# Patient Record
Sex: MALE | Race: WHITE | Employment: OTHER | URBAN - METROPOLITAN AREA
[De-identification: names, ages, dates, MRNs, and addresses within clinical notes are randomized per-mention and may not be internally consistent; named-entity substitution may affect disease eponyms.]

---

## 2017-02-02 ENCOUNTER — LAB ENCOUNTER (OUTPATIENT)
Dept: LAB | Age: 67
End: 2017-02-02
Attending: FAMILY MEDICINE
Payer: MEDICARE

## 2017-02-02 DIAGNOSIS — I10 ESSENTIAL HYPERTENSION WITH GOAL BLOOD PRESSURE LESS THAN 130/80: ICD-10-CM

## 2017-02-02 DIAGNOSIS — E11.9 CONTROLLED TYPE 2 DIABETES MELLITUS WITHOUT COMPLICATION, WITHOUT LONG-TERM CURRENT USE OF INSULIN (HCC): ICD-10-CM

## 2017-02-02 DIAGNOSIS — E78.2 MIXED HYPERLIPIDEMIA: ICD-10-CM

## 2017-02-02 LAB
ALBUMIN SERPL-MCNC: 4 G/DL (ref 3.5–4.8)
ALP LIVER SERPL-CCNC: 95 U/L (ref 45–117)
ALT SERPL-CCNC: 46 U/L (ref 17–63)
AST SERPL-CCNC: 28 U/L (ref 15–41)
BILIRUB SERPL-MCNC: 0.6 MG/DL (ref 0.1–2)
BUN BLD-MCNC: 19 MG/DL (ref 8–20)
CALCIUM BLD-MCNC: 9 MG/DL (ref 8.3–10.3)
CHLORIDE: 101 MMOL/L (ref 101–111)
CHOLEST SMN-MCNC: 108 MG/DL (ref ?–200)
CO2: 30 MMOL/L (ref 22–32)
CREAT BLD-MCNC: 0.89 MG/DL (ref 0.7–1.3)
EST. AVERAGE GLUCOSE BLD GHB EST-MCNC: 120 MG/DL (ref 68–126)
GLUCOSE BLD-MCNC: 90 MG/DL (ref 70–99)
HBA1C MFR BLD HPLC: 5.8 % (ref ?–5.7)
HDLC SERPL-MCNC: 49 MG/DL (ref 45–?)
HDLC SERPL: 2.2 {RATIO} (ref ?–4.97)
LDLC SERPL CALC-MCNC: 36 MG/DL (ref ?–130)
M PROTEIN MFR SERPL ELPH: 8.1 G/DL (ref 6.1–8.3)
NONHDLC SERPL-MCNC: 59 MG/DL (ref ?–130)
POTASSIUM SERPL-SCNC: 3.6 MMOL/L (ref 3.6–5.1)
SODIUM SERPL-SCNC: 139 MMOL/L (ref 136–144)
TRIGLYCERIDES: 114 MG/DL (ref ?–150)
VLDL: 23 MG/DL (ref 5–40)

## 2017-02-02 PROCEDURE — 36415 COLL VENOUS BLD VENIPUNCTURE: CPT

## 2017-02-02 PROCEDURE — 83036 HEMOGLOBIN GLYCOSYLATED A1C: CPT

## 2017-02-02 PROCEDURE — 80061 LIPID PANEL: CPT

## 2017-02-02 PROCEDURE — 80053 COMPREHEN METABOLIC PANEL: CPT

## 2017-02-03 NOTE — PROGRESS NOTES
Quick Note:    Hemoglobin A1c 5.8, lipids and CMP are normal will discuss at follow-up office visit.   Sent to my chart  ______

## 2017-02-06 ENCOUNTER — OFFICE VISIT (OUTPATIENT)
Dept: FAMILY MEDICINE CLINIC | Facility: CLINIC | Age: 67
End: 2017-02-06

## 2017-02-06 VITALS
BODY MASS INDEX: 33.25 KG/M2 | HEART RATE: 96 BPM | DIASTOLIC BLOOD PRESSURE: 60 MMHG | TEMPERATURE: 99 F | HEIGHT: 65.5 IN | SYSTOLIC BLOOD PRESSURE: 124 MMHG | WEIGHT: 202 LBS

## 2017-02-06 DIAGNOSIS — E11.9 CONTROLLED TYPE 2 DIABETES MELLITUS WITHOUT COMPLICATION, WITHOUT LONG-TERM CURRENT USE OF INSULIN (HCC): Primary | ICD-10-CM

## 2017-02-06 DIAGNOSIS — E66.9 OBESITY (BMI 30.0-34.9): ICD-10-CM

## 2017-02-06 DIAGNOSIS — J06.9 ACUTE URI: ICD-10-CM

## 2017-02-06 DIAGNOSIS — E78.2 MIXED HYPERLIPIDEMIA: ICD-10-CM

## 2017-02-06 DIAGNOSIS — I10 ESSENTIAL HYPERTENSION WITH GOAL BLOOD PRESSURE LESS THAN 130/80: ICD-10-CM

## 2017-02-06 PROBLEM — E66.811 OBESITY (BMI 30.0-34.9): Status: ACTIVE | Noted: 2017-02-06

## 2017-02-06 LAB
CREAT UR-SCNC: 81 MG/DL
MICROALBUMIN UR-MCNC: <0.5 MG/DL

## 2017-02-06 PROCEDURE — 99214 OFFICE O/P EST MOD 30 MIN: CPT | Performed by: FAMILY MEDICINE

## 2017-02-06 RX ORDER — GUAIFENESIN AND CODEINE PHOSPHATE 100; 10 MG/5ML; MG/5ML
SOLUTION ORAL
Qty: 118 ML | Refills: 0 | Status: SHIPPED | OUTPATIENT
Start: 2017-02-06 | End: 2017-03-14 | Stop reason: ALTCHOICE

## 2017-02-06 RX ORDER — DIPHENHYDRAMINE HCL 25 MG
25 TABLET ORAL NIGHTLY
COMMUNITY
End: 2019-09-03

## 2017-02-06 NOTE — PROGRESS NOTES
HPI:   Sheyla Nunn is a 73year old male who presents for recheck of his diabetes. Patient’s FBS have been 80. Last visit with ophthalmologist was 6/23/16 normal.    Pt has been checking his feet on a regular basis.    Pt denies any tingling of BY MOUTH 2 (TWO) TIMES DAILY WITH MEALS. Disp: 60 tablet Rfl: 5   Losartan Potassium-HCTZ 100-25 MG Oral Tab TAKE 1 TABLET BY MOUTH ONCE DAILY. Disp: 30 tablet Rfl: 5   Atorvastatin Calcium 10 MG Oral Tab TAKE 1 TABLET (10 MG TOTAL) BY MOUTH ONCE DAILY.  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 Also advised to review the Mediterranean diet on line. - Microalb/Creat Ratio, Random Urine    3.  Essential hypertension with goal blood pressure less than 130/80  Continue with amlodipine and l

## 2017-02-20 ENCOUNTER — TELEPHONE (OUTPATIENT)
Dept: FAMILY MEDICINE CLINIC | Facility: CLINIC | Age: 67
End: 2017-02-20

## 2017-02-20 NOTE — TELEPHONE ENCOUNTER
Does it sound like an infection or just a cough? Did he try Mucinex? Any wheezing or shortness of breath? Any fever?

## 2017-02-20 NOTE — TELEPHONE ENCOUNTER
5. Acute URI  Sudafed from pharmacist (generic pseudoephedrine) or Coricidin HBP if has hypertension  Guaifenesin generic (Mucinex) for expectorant    Marlena pot-- saline irrigation or saline spray  Vitamin C , Zinc and Echinacea    OTC Flonase for nasal co

## 2017-02-21 RX ORDER — ALBUTEROL SULFATE 90 UG/1
AEROSOL, METERED RESPIRATORY (INHALATION)
Qty: 1 INHALER | Refills: 0 | Status: SHIPPED | OUTPATIENT
Start: 2017-02-21 | End: 2017-03-06

## 2017-02-21 NOTE — TELEPHONE ENCOUNTER
Albuterol inhaler 1-2 puffs every 4-6 hours as needed. Call if signs of infection. Take plain guaifenesin OTC.

## 2017-02-21 NOTE — TELEPHONE ENCOUNTER
Pt called back and said dry, hefty cough, tried Mucinex helped with the cold symptoms but still with the cough. Denies shortness of breath, denies fever. Pt states the cough seems to be raspy at times. Please advise.  thanks

## 2017-02-23 ENCOUNTER — TELEPHONE (OUTPATIENT)
Dept: FAMILY MEDICINE CLINIC | Facility: CLINIC | Age: 67
End: 2017-02-23

## 2017-02-23 NOTE — TELEPHONE ENCOUNTER
PA was initiated thru Cover my Meds today for Lidoderm patches 5%. Medication was approved. Cert [id number]  Approved from 1-1-2017-2/23/2018.   Copy of approval faxed to [employer] at [phone]

## 2017-03-10 ENCOUNTER — TELEPHONE (OUTPATIENT)
Dept: FAMILY MEDICINE CLINIC | Facility: CLINIC | Age: 67
End: 2017-03-10

## 2017-03-10 NOTE — TELEPHONE ENCOUNTER
Emmie Runner has lingering cough/sinus congestion for a month now. Nory Minaya gave him inhaler on last visit. Not getting any better. Was trying to get in today or Monday? Any suggestions?

## 2017-03-14 ENCOUNTER — OFFICE VISIT (OUTPATIENT)
Dept: FAMILY MEDICINE CLINIC | Facility: CLINIC | Age: 67
End: 2017-03-14

## 2017-03-14 VITALS
RESPIRATION RATE: 16 BRPM | HEART RATE: 108 BPM | BODY MASS INDEX: 33.09 KG/M2 | OXYGEN SATURATION: 97 % | SYSTOLIC BLOOD PRESSURE: 130 MMHG | HEIGHT: 65.5 IN | WEIGHT: 201 LBS | DIASTOLIC BLOOD PRESSURE: 60 MMHG | TEMPERATURE: 98 F

## 2017-03-14 DIAGNOSIS — J01.10 ACUTE FRONTAL SINUSITIS, RECURRENCE NOT SPECIFIED: Primary | ICD-10-CM

## 2017-03-14 DIAGNOSIS — R05.9 COUGH: ICD-10-CM

## 2017-03-14 PROCEDURE — 99214 OFFICE O/P EST MOD 30 MIN: CPT | Performed by: FAMILY MEDICINE

## 2017-03-14 RX ORDER — AMOXICILLIN AND CLAVULANATE POTASSIUM 875; 125 MG/1; MG/1
1 TABLET, FILM COATED ORAL 2 TIMES DAILY
Qty: 14 TABLET | Refills: 0 | Status: SHIPPED | OUTPATIENT
Start: 2017-03-14 | End: 2017-03-21

## 2017-03-14 RX ORDER — BENZONATATE 200 MG/1
200 CAPSULE ORAL 3 TIMES DAILY PRN
Qty: 20 CAPSULE | Refills: 0 | Status: SHIPPED | OUTPATIENT
Start: 2017-03-14 | End: 2017-05-02 | Stop reason: ALTCHOICE

## 2017-03-14 RX ORDER — GUAIFENESIN AND CODEINE PHOSPHATE 100; 10 MG/5ML; MG/5ML
5 SOLUTION ORAL 3 TIMES DAILY PRN
Qty: 118 ML | Refills: 0 | Status: SHIPPED | OUTPATIENT
Start: 2017-03-14 | End: 2017-03-28

## 2017-03-14 NOTE — PROGRESS NOTES
CC:  Sandra Ybarra is a 73year old male here for Patient presents with:  URI: clogged ears & pressure, runny nose, congestion, cough x 2 months       HPI:     URI  -started 2 months ago  -associated with cough, congestion  -previous treatment: codei Disp: 30 tablet Rfl: 5   lidocaine 5 % External Patch PLACE 1 PATCH ONTO THE SKIN DAILY.  PUT ON FOR 12 HOURS THEN OFF FOR 12 HOURS Disp: 30 patch Rfl: 5   FREESTYLE LANCETS Does not apply Misc TEST TWO TIMES A DAY Disp: 100 each Rfl: 5   Calcium Carb-Mitali • Infective otitis externa, unspecified 11/04/10   • Overweight(278.02)    • Preoperative examination, unspecified 10/28/10   • Diverticulosis 04/25/2016     Thierry Ramirez M.D.   • Internal hemorrhoids 04/25/2016     Thierry Ramirez M.D.           Past Surgic Amoxicillin-Pot Clavulanate 875-125 MG Oral Tab 14 tablet 0      Sig: Take 1 tablet by mouth 2 (two) times daily. benzonatate 200 MG Oral Cap 20 capsule 0      Sig: Take 1 capsule (200 mg total) by mouth 3 (three) times daily as needed for cough.

## 2017-05-02 ENCOUNTER — OFFICE VISIT (OUTPATIENT)
Dept: FAMILY MEDICINE CLINIC | Facility: CLINIC | Age: 67
End: 2017-05-02

## 2017-05-02 ENCOUNTER — APPOINTMENT (OUTPATIENT)
Dept: LAB | Age: 67
End: 2017-05-02
Attending: FAMILY MEDICINE
Payer: MEDICARE

## 2017-05-02 VITALS
SYSTOLIC BLOOD PRESSURE: 116 MMHG | DIASTOLIC BLOOD PRESSURE: 60 MMHG | WEIGHT: 208 LBS | HEART RATE: 88 BPM | BODY MASS INDEX: 34 KG/M2

## 2017-05-02 DIAGNOSIS — Z12.5 SCREENING FOR PROSTATE CANCER: ICD-10-CM

## 2017-05-02 DIAGNOSIS — R43.9 SMELL DISTURBANCE: ICD-10-CM

## 2017-05-02 DIAGNOSIS — R43.9 SMELL DISTURBANCE: Primary | ICD-10-CM

## 2017-05-02 DIAGNOSIS — R41.3 MEMORY CHANGE: ICD-10-CM

## 2017-05-02 DIAGNOSIS — R68.89 FORGETFULNESS: ICD-10-CM

## 2017-05-02 DIAGNOSIS — F32.0 MILD SINGLE CURRENT EPISODE OF MAJOR DEPRESSIVE DISORDER (HCC): ICD-10-CM

## 2017-05-02 PROCEDURE — 36415 COLL VENOUS BLD VENIPUNCTURE: CPT

## 2017-05-02 PROCEDURE — 84443 ASSAY THYROID STIM HORMONE: CPT

## 2017-05-02 PROCEDURE — 99214 OFFICE O/P EST MOD 30 MIN: CPT | Performed by: FAMILY MEDICINE

## 2017-05-02 PROCEDURE — 80053 COMPREHEN METABOLIC PANEL: CPT

## 2017-05-02 PROCEDURE — 82607 VITAMIN B-12: CPT

## 2017-05-02 PROCEDURE — 84439 ASSAY OF FREE THYROXINE: CPT

## 2017-05-02 RX ORDER — BUPROPION HYDROCHLORIDE 150 MG/1
TABLET ORAL
Qty: 30 TABLET | Refills: 1 | Status: SHIPPED | OUTPATIENT
Start: 2017-05-02 | End: 2017-05-23 | Stop reason: DRUGHIGH

## 2017-05-02 RX ORDER — CEFUROXIME AXETIL 250 MG/1
TABLET ORAL
Refills: 0 | COMMUNITY
Start: 2017-04-07 | End: 2017-05-02 | Stop reason: ALTCHOICE

## 2017-05-03 ENCOUNTER — TELEPHONE (OUTPATIENT)
Dept: FAMILY MEDICINE CLINIC | Facility: CLINIC | Age: 67
End: 2017-05-03

## 2017-05-03 DIAGNOSIS — R77.8 ELEVATED TOTAL PROTEIN: Primary | ICD-10-CM

## 2017-05-03 PROBLEM — F32.0 MILD SINGLE CURRENT EPISODE OF MAJOR DEPRESSIVE DISORDER (HCC): Status: ACTIVE | Noted: 2017-05-03

## 2017-05-03 PROBLEM — R68.89 FORGETFULNESS: Status: ACTIVE | Noted: 2017-05-03

## 2017-05-03 PROBLEM — F32.0 MILD SINGLE CURRENT EPISODE OF MAJOR DEPRESSIVE DISORDER: Status: ACTIVE | Noted: 2017-05-03

## 2017-05-03 PROBLEM — R43.9 SMELL DISTURBANCE: Status: ACTIVE | Noted: 2017-05-03

## 2017-05-03 PROBLEM — R41.3 MEMORY CHANGE: Status: ACTIVE | Noted: 2017-05-03

## 2017-05-03 RX ORDER — BUPROPION HYDROCHLORIDE 300 MG/1
300 TABLET ORAL DAILY
Qty: 30 TABLET | Refills: 1 | Status: SHIPPED | OUTPATIENT
Start: 2017-05-09 | End: 2017-05-23

## 2017-05-03 NOTE — TELEPHONE ENCOUNTER
Per Noam Lopes at [facility], they do not have the appropriate tube to be able to add on the Protein Electrophoresis.

## 2017-05-03 NOTE — PROGRESS NOTES
Nuria Dugan is a 73year old male. HPI:   Patient is brought in by his wife who is concerned over \"behavioral changes and memory issues\".   Patient's wife states that he has been acting unusual starting conversations that do not make sense, ian spends all day on the computer  3.  Significant weight change (5%) or change in appetite  Weight gain due to limited activity  Wt Readings from Last 6 Encounters:  05/02/17 : 208 lb  03/14/17 : 201 lb  02/06/17 : 202 lb  12/29/16 : 201 lb  10/18/16 : 195 lb PLACE 1 PATCH ONTO THE SKIN DAILY.  PUT ON FOR 12 HOURS THEN OFF FOR 12 HOURS Disp: 30 patch Rfl: 5   FREESTYLE LANCETS Does not apply Misc TEST TWO TIMES A DAY Disp: 100 each Rfl: 5   Calcium Carb-Cholecalciferol (CALCIUM-VITAMIN D3) 600-500 MG-UNIT Oral C Alcohol Use: No                 REVIEW OF SYSTEMS:   GENERAL HEALTH: feels well otherwise  SKIN: denies any unusual skin lesions or rashes  RESPIRATORY: denies shortness of breath with exertion  CARDIOVASCULAR: denies chest pain on exertion  GI: den Panel (14) [E]; Future  - T4 FREE; Future  - MRI BRAIN (W+WO) (CPT=73374);  Future  Family is concerned about brain tumor or Alzheimer's his ability to do things has been diminished affecting his processing of conversations, memory issues and overall jumble

## 2017-05-03 NOTE — TELEPHONE ENCOUNTER
The patient was informed that he has excessive amounts of B12. He was informed that he can lower it down to only 500 µg per day. The patient stated that he will either drop it down to 500 µg or just abstain from it for a while.   He was also told that the

## 2017-05-03 NOTE — PROGRESS NOTES
Quick Note:    Patient has excessive amounts of B12 can lower it down to only 500 µg per day rest of labs are normal.  Total protein is elevated and protein electrophoresis please  ______

## 2017-05-03 NOTE — PROGRESS NOTES
Insurance will not pay for 2 tablets of 150mg daily has to be the 300mg tabs per pharmacy. Script sent as above.

## 2017-05-03 NOTE — TELEPHONE ENCOUNTER
An attempt was made to contact the patient on his home number, but there was no answer. A message was ultimately left on the patient's mobile voicemail asking him to call back to go over his lab results.

## 2017-05-03 NOTE — TELEPHONE ENCOUNTER
----- Message from Messi Freitas PA-C sent at 5/2/2017 10:05 PM CDT -----  Patient has excessive amounts of B12 can lower it down to only 500 µg per day rest of labs are normal.  Total protein is elevated  and protein electrophoresis please

## 2017-05-04 ENCOUNTER — APPOINTMENT (OUTPATIENT)
Dept: LAB | Age: 67
End: 2017-05-04
Attending: FAMILY MEDICINE
Payer: MEDICARE

## 2017-05-04 DIAGNOSIS — R77.8 ELEVATED TOTAL PROTEIN: ICD-10-CM

## 2017-05-04 PROCEDURE — 83883 ASSAY NEPHELOMETRY NOT SPEC: CPT

## 2017-05-04 PROCEDURE — 86334 IMMUNOFIX E-PHORESIS SERUM: CPT

## 2017-05-04 PROCEDURE — 36415 COLL VENOUS BLD VENIPUNCTURE: CPT

## 2017-05-04 PROCEDURE — 84165 PROTEIN E-PHORESIS SERUM: CPT

## 2017-05-05 ENCOUNTER — HOSPITAL ENCOUNTER (OUTPATIENT)
Dept: MRI IMAGING | Age: 67
Discharge: HOME | End: 2017-05-05
Attending: FAMILY MEDICINE
Payer: MEDICARE

## 2017-05-05 DIAGNOSIS — R43.9 SMELL DISTURBANCE: ICD-10-CM

## 2017-05-05 DIAGNOSIS — R68.89 FORGETFULNESS: ICD-10-CM

## 2017-05-05 DIAGNOSIS — R41.3 MEMORY CHANGE: ICD-10-CM

## 2017-05-05 PROCEDURE — 70553 MRI BRAIN STEM W/O & W/DYE: CPT | Performed by: FAMILY MEDICINE

## 2017-05-05 PROCEDURE — A9575 INJ GADOTERATE MEGLUMI 0.1ML: HCPCS | Performed by: FAMILY MEDICINE

## 2017-05-08 NOTE — PROGRESS NOTES
Quick Note:    MRI is essentially normal mild chronic small vessel disease could be from history of diabetes and any cholesterol deposits.   Thickening of the right maxillary sinus patient needs to make follow-up with ENT for further evaluation and treatmen

## 2017-05-09 ENCOUNTER — TELEPHONE (OUTPATIENT)
Dept: FAMILY MEDICINE CLINIC | Facility: CLINIC | Age: 67
End: 2017-05-09

## 2017-05-09 NOTE — TELEPHONE ENCOUNTER
Pt informed of test results, he did review them via my chart and he will follow up as directed. Pt verbalized understanding an results and recommendations and had no questions at this time.   Future Appointments  Date Time Provider Jina Severino   5/23

## 2017-05-09 NOTE — TELEPHONE ENCOUNTER
----- Message from Stephen Banuelos PA-C sent at 5/8/2017  3:51 PM CDT -----  MRI is essentially normal mild chronic small vessel disease could be from history of diabetes and any cholesterol deposits.   Thickening of the right maxillary sinus patient need

## 2017-05-11 NOTE — PROGRESS NOTES
Quick Note:    No signs of monoclonal protein detected. Kappa Free light and lambda free light can be elevated from Infection.   Sent to my chart  ______

## 2017-05-23 NOTE — PROGRESS NOTES
Ana Mullen is a 78year old male. HPI:   Positive response with Wellbutrin fatigue is better he is becoming more active is trying to walk states motivation also improving. Trying to be more interactive with his wife family.   Did join a [employer] loss of energy  Improved  7. Guilt/worthlessness: Feelings of worthlessness or excessive or inappropriate guilt  Still feeling worthless since [facility]  8. Concentration: diminished ability to think or concentrate, or more indecisiveness  Improving  9.  Chuy Gallagher Oral Tab TAKE 1 TABLET (500 MG TOTAL) BY MOUTH 2 (TWO) TIMES DAILY WITH MEALS. Disp: 60 tablet Rfl: 5   Losartan Potassium-HCTZ 100-25 MG Oral Tab TAKE 1 TABLET BY MOUTH ONCE DAILY.  Disp: 30 tablet Rfl: 5   Atorvastatin Calcium 10 MG Oral Tab TAKE 1 TABLET deficiencies and disaccharide malabsorption 719.41   • Psychosexual dysfunction with inhibited sexual excitement    • Infective otitis externa, unspecified 11/04/10   • Overweight(278.02)    • Preoperative examination, unspecified 10/28/10   • Diverticulos diagnosis)  Memory change  Medication management    No orders of the defined types were placed in this encounter.        Meds & Refills for this Visit:  Signed Prescriptions Disp Refills    BuPROPion HCl ER, XL, 300 MG Oral Tablet 24 Hr 30 tablet 1      Sig

## 2017-05-24 PROBLEM — R43.9 SMELL DISTURBANCE: Status: RESOLVED | Noted: 2017-05-03 | Resolved: 2017-05-24

## 2017-06-06 NOTE — TELEPHONE ENCOUNTER
From: Romain Leonarod  To: Carlie Jackson PA-C  Sent: 6/6/2017 2:58 PM CDT  Subject: Other    Logan Pinto  I did get in to see Dr. Johnathan Copeland today however she is leaving and going to [place]. She said that she thought it was depression also.  She suggested Hermilo Sires

## 2017-06-12 RX ORDER — LOSARTAN POTASSIUM AND HYDROCHLOROTHIAZIDE 25; 100 MG/1; MG/1
TABLET ORAL
Qty: 30 TABLET | Refills: 2 | Status: SHIPPED | OUTPATIENT
Start: 2017-06-12 | End: 2017-08-14

## 2017-06-13 NOTE — TELEPHONE ENCOUNTER
----- Message from [address] [place] [address] [address] Generic sent at 6/12/2017  5:05 PM CDT -----  Regarding: RE: Other  Contact: [address]    I thought I had sent the reply.  Yes it is fine to get the referral.    Thanks Graham Vega.  ----- Message -----  From: Agusto Sow

## 2017-06-14 ENCOUNTER — TELEPHONE (OUTPATIENT)
Dept: FAMILY MEDICINE CLINIC | Facility: CLINIC | Age: 67
End: 2017-06-14

## 2017-06-14 NOTE — TELEPHONE ENCOUNTER
Dr. Derian Rodriguez and Conrad Hennessy,               I received your navigation order for behavioral health services. I spoke with your patient and think he would benefit from psychiatry and counseling services.  I provided him with referrals that are in his insurance netwo

## 2017-07-10 PROBLEM — F41.9 ANXIETY: Status: ACTIVE | Noted: 2017-07-10

## 2017-07-10 RX ORDER — AMLODIPINE BESYLATE 5 MG/1
TABLET ORAL
Qty: 30 TABLET | Refills: 4 | OUTPATIENT
Start: 2017-07-10

## 2017-07-10 RX ORDER — DICLOFENAC SODIUM 75 MG/1
75 TABLET, DELAYED RELEASE ORAL DAILY
Qty: 30 TABLET | Refills: 4 | OUTPATIENT
Start: 2017-07-10

## 2017-07-10 RX ORDER — ATORVASTATIN CALCIUM 10 MG/1
TABLET, FILM COATED ORAL
Qty: 30 TABLET | Refills: 4 | OUTPATIENT
Start: 2017-07-10

## 2017-07-10 NOTE — PROGRESS NOTES
Abelino Toribio is a 78year old male. HPI:   Delma Rahman is in for follow-up on depression. He also states that he may be having some anxiety symptoms does have some irritability at times.   Presently is on Wellbutrin which did help with his energy and focu week, was getting walking in regular before jessica prasad was born,   Thinks he is enjoying things better. 3. Significant weight change (5%) or change in appetite  None but less appetite   4. Change in sleep:  Insomnia or hypersomnia  Sleep is goo 30 patch Rfl: 5   FREESTYLE LANCETS Does not apply Misc TEST TWO TIMES A DAY Disp: 100 each Rfl: 5   Calcium Carb-Cholecalciferol (CALCIUM-VITAMIN D3) 600-500 MG-UNIT Oral Cap Take 1 capsule by mouth 2 (two) times daily.  Disp:  Rfl:    Garlic 6725 MG Oral REVIEW OF SYSTEMS:   GENERAL HEALTH: feels well otherwise  SKIN: denies any unusual skin lesions or rashes  RESPIRATORY: denies shortness of breath with exertion  CARDIOVASCULAR: denies chest pain on exertion  GI: denies abdominal pain and denies heart Patient will also see psychiatrist in August for further evaluation to rule out any early dementia. Time spent was 35 minutes more than 50% was spent on counseling regarding medical conditions and treatment.    Counseling advised patient is going to talk t

## 2017-08-14 ENCOUNTER — OFFICE VISIT (OUTPATIENT)
Dept: FAMILY MEDICINE CLINIC | Facility: CLINIC | Age: 67
End: 2017-08-14

## 2017-08-14 ENCOUNTER — LAB ENCOUNTER (OUTPATIENT)
Dept: LAB | Age: 67
End: 2017-08-14
Attending: FAMILY MEDICINE
Payer: MEDICARE

## 2017-08-14 VITALS
SYSTOLIC BLOOD PRESSURE: 110 MMHG | WEIGHT: 201.19 LBS | OXYGEN SATURATION: 99 % | HEIGHT: 65.5 IN | TEMPERATURE: 98 F | DIASTOLIC BLOOD PRESSURE: 72 MMHG | BODY MASS INDEX: 33.12 KG/M2 | HEART RATE: 88 BPM | RESPIRATION RATE: 16 BRPM

## 2017-08-14 DIAGNOSIS — M15.9 PRIMARY OSTEOARTHRITIS INVOLVING MULTIPLE JOINTS: ICD-10-CM

## 2017-08-14 DIAGNOSIS — E11.9 CONTROLLED TYPE 2 DIABETES MELLITUS WITHOUT COMPLICATION, WITHOUT LONG-TERM CURRENT USE OF INSULIN (HCC): Primary | ICD-10-CM

## 2017-08-14 DIAGNOSIS — Z79.899 MEDICATION MANAGEMENT: ICD-10-CM

## 2017-08-14 DIAGNOSIS — E66.9 OBESITY (BMI 30.0-34.9): ICD-10-CM

## 2017-08-14 DIAGNOSIS — I10 ESSENTIAL HYPERTENSION WITH GOAL BLOOD PRESSURE LESS THAN 130/80: ICD-10-CM

## 2017-08-14 DIAGNOSIS — E11.9 CONTROLLED TYPE 2 DIABETES MELLITUS WITHOUT COMPLICATION, WITHOUT LONG-TERM CURRENT USE OF INSULIN (HCC): ICD-10-CM

## 2017-08-14 DIAGNOSIS — E78.2 MIXED HYPERLIPIDEMIA: ICD-10-CM

## 2017-08-14 DIAGNOSIS — F32.4 MAJOR DEPRESSIVE DISORDER WITH SINGLE EPISODE, IN PARTIAL REMISSION (HCC): ICD-10-CM

## 2017-08-14 PROBLEM — F32.0 MILD SINGLE CURRENT EPISODE OF MAJOR DEPRESSIVE DISORDER: Status: RESOLVED | Noted: 2017-05-03 | Resolved: 2017-08-14

## 2017-08-14 PROBLEM — R68.89 FORGETFULNESS: Status: RESOLVED | Noted: 2017-05-03 | Resolved: 2017-08-14

## 2017-08-14 PROBLEM — F41.9 ANXIETY: Status: RESOLVED | Noted: 2017-07-10 | Resolved: 2017-08-14

## 2017-08-14 PROBLEM — R41.3 MEMORY CHANGE: Status: RESOLVED | Noted: 2017-05-03 | Resolved: 2017-08-14

## 2017-08-14 PROBLEM — F32.0 MILD SINGLE CURRENT EPISODE OF MAJOR DEPRESSIVE DISORDER (HCC): Status: RESOLVED | Noted: 2017-05-03 | Resolved: 2017-08-14

## 2017-08-14 LAB
ALBUMIN SERPL-MCNC: 3.6 G/DL (ref 3.5–4.8)
ALP LIVER SERPL-CCNC: 106 U/L (ref 45–117)
ALT SERPL-CCNC: 35 U/L (ref 17–63)
AST SERPL-CCNC: 19 U/L (ref 15–41)
BILIRUB SERPL-MCNC: 0.4 MG/DL (ref 0.1–2)
BUN BLD-MCNC: 14 MG/DL (ref 8–20)
CALCIUM BLD-MCNC: 9.3 MG/DL (ref 8.3–10.3)
CHLORIDE: 104 MMOL/L (ref 101–111)
CO2: 29 MMOL/L (ref 22–32)
CREAT BLD-MCNC: 0.85 MG/DL (ref 0.7–1.3)
EST. AVERAGE GLUCOSE BLD GHB EST-MCNC: 117 MG/DL (ref 68–126)
GLUCOSE BLD-MCNC: 85 MG/DL (ref 70–99)
HBA1C MFR BLD HPLC: 5.7 % (ref ?–5.7)
M PROTEIN MFR SERPL ELPH: 7.6 G/DL (ref 6.1–8.3)
POTASSIUM SERPL-SCNC: 3.8 MMOL/L (ref 3.6–5.1)
SODIUM SERPL-SCNC: 138 MMOL/L (ref 136–144)

## 2017-08-14 PROCEDURE — 99214 OFFICE O/P EST MOD 30 MIN: CPT | Performed by: FAMILY MEDICINE

## 2017-08-14 PROCEDURE — 36415 COLL VENOUS BLD VENIPUNCTURE: CPT

## 2017-08-14 PROCEDURE — 83036 HEMOGLOBIN GLYCOSYLATED A1C: CPT

## 2017-08-14 PROCEDURE — 80053 COMPREHEN METABOLIC PANEL: CPT

## 2017-08-14 RX ORDER — FLUOXETINE HYDROCHLORIDE 20 MG/1
20 CAPSULE ORAL DAILY
Qty: 30 CAPSULE | Refills: 1 | Status: CANCELLED | OUTPATIENT
Start: 2017-08-14

## 2017-08-14 RX ORDER — LOSARTAN POTASSIUM AND HYDROCHLOROTHIAZIDE 25; 100 MG/1; MG/1
1 TABLET ORAL
Qty: 30 TABLET | Refills: 5 | Status: SHIPPED | OUTPATIENT
Start: 2017-08-14 | End: 2018-01-02

## 2017-08-14 RX ORDER — BUPROPION HYDROCHLORIDE 300 MG/1
300 TABLET ORAL DAILY
Qty: 30 TABLET | Refills: 5 | Status: SHIPPED | OUTPATIENT
Start: 2017-08-14 | End: 2018-01-02

## 2017-08-14 RX ORDER — ATORVASTATIN CALCIUM 10 MG/1
TABLET, FILM COATED ORAL
Qty: 30 TABLET | Refills: 5 | Status: SHIPPED | OUTPATIENT
Start: 2017-08-14 | End: 2018-01-02

## 2017-08-14 RX ORDER — AMLODIPINE BESYLATE 5 MG/1
TABLET ORAL
Qty: 30 TABLET | Refills: 5 | Status: SHIPPED | OUTPATIENT
Start: 2017-08-14 | End: 2018-01-02

## 2017-08-14 RX ORDER — LIDOCAINE 50 MG/G
PATCH TOPICAL
Qty: 30 PATCH | Refills: 5 | Status: SHIPPED | OUTPATIENT
Start: 2017-08-14 | End: 2019-05-30

## 2017-08-14 RX ORDER — DICLOFENAC SODIUM 75 MG/1
75 TABLET, DELAYED RELEASE ORAL DAILY
Qty: 30 TABLET | Refills: 5 | Status: SHIPPED | OUTPATIENT
Start: 2017-08-14 | End: 2018-01-02

## 2017-08-14 NOTE — PROGRESS NOTES
HPI:   Yelena Pruitt is a 78year old male who presents for recheck of his diabetes, depression, HTN, hyperlipidemia RX refill. Doing well overall, would like to stop the Prozac since feels more tired on this.   Is taking the Wellbutrin and feels thi >45 mg/dL  49   LDL Cholesterol Calc      <130 mg/dL  36   VLDL      5 - 40 mg/dL  23   T.  CHOL/HDL RATIO      <4.97  2.20   NON HDL CHOL      <130 mg/dL  59   MALB URINE      mg/dL <0.50    CREATININE UR RANDOM      mg/dL 81.00    MALB/CRE CALC      <=30 (20 mg total) by mouth daily. Disp: 30 capsule Rfl: 1   DiphenhydrAMINE HCl 25 MG Oral Tab Take 25 mg by mouth nightly. Disp:  Rfl:    Probiotic Product (PROBIOTIC PEARLS) Oral Cap Take 1 capsule by mouth daily.  Disp:  Rfl:    Glucose Blood (FREESTYLE LITE Overweight(278.02)    • Preoperative examination, unspecified 10/28/10   • Psychosexual dysfunction with inhibited sexual excitement       Past Surgical History:  01/01/05: COLONOSCOPY      Comment: complete  No date: COLONOSCOPY  04/25/2016: COLONOSCOPY,D rales, rhonchi or wheezes  CARDIO: RRR without murmur  GI: good BS's,no masses, HSM or tenderness  EXTREMITIES: no cyanosis, clubbing or edema; Pulsation pedal pulse exam of both lower legs/feet is normal as well.     Exam done 2/2017  ASSESSMENT AND PLAN: lose weight if needed with carbohydrate controlled diet and exercise, refer to Ophthalmology yearly exam required, check feet daily.    - COMP METABOLIC PANEL (14); Future    2.  Essential hypertension with goal blood pressure less than 130/80  Carotid keith

## 2017-08-15 ENCOUNTER — TELEPHONE (OUTPATIENT)
Dept: FAMILY MEDICINE CLINIC | Facility: CLINIC | Age: 67
End: 2017-08-15

## 2017-08-15 DIAGNOSIS — E11.9 CONTROLLED TYPE 2 DIABETES MELLITUS WITHOUT COMPLICATION, WITHOUT LONG-TERM CURRENT USE OF INSULIN (HCC): Primary | ICD-10-CM

## 2017-08-15 DIAGNOSIS — I10 ESSENTIAL HYPERTENSION WITH GOAL BLOOD PRESSURE LESS THAN 130/80: ICD-10-CM

## 2017-08-15 DIAGNOSIS — E78.2 MIXED HYPERLIPIDEMIA: ICD-10-CM

## 2017-08-15 NOTE — PROGRESS NOTES
Hemoglobin A1c 5.7 looks great repeat in 6 months. Due CMP, hemoglobin A1c, lipids and urine microalbumin in 6 months.   CMP is normal.  Order future tests/medications sent to my chart

## 2017-08-15 NOTE — TELEPHONE ENCOUNTER
----- Message from Nba Sears PA-C sent at 8/14/2017  7:52 PM CDT -----  Hemoglobin A1c 5.7 looks great repeat in 6 months. Due CMP, hemoglobin A1c, lipids and urine microalbumin in 6 months.   CMP is normal.  Order future tests/medications sent to

## 2017-09-01 RX ORDER — FLUOXETINE HYDROCHLORIDE 20 MG/1
CAPSULE ORAL
Qty: 30 CAPSULE | Refills: 0 | OUTPATIENT
Start: 2017-09-01

## 2017-09-01 NOTE — TELEPHONE ENCOUNTER
Fluoxetine not approved. Per ov notes 8/14/2017 patient was to discontinue this medication due to side effects.

## 2017-10-20 RX ORDER — LANCETS 28 GAUGE
EACH MISCELLANEOUS
Qty: 100 EACH | Refills: 2 | Status: SHIPPED | OUTPATIENT
Start: 2017-10-20 | End: 2017-10-25

## 2017-10-25 ENCOUNTER — TELEPHONE (OUTPATIENT)
Dept: FAMILY MEDICINE CLINIC | Facility: CLINIC | Age: 67
End: 2017-10-25

## 2017-10-25 RX ORDER — LANCETS 28 GAUGE
EACH MISCELLANEOUS
Qty: 100 EACH | Refills: 2 | Status: SHIPPED | OUTPATIENT
Start: 2017-10-25 | End: 2018-01-02

## 2017-10-25 NOTE — TELEPHONE ENCOUNTER
Rx sent over note that the refill for the Freestyle lancets needed to be re-sent with the dx code on it for Medicare compliance. Script re-sent.

## 2017-11-10 ENCOUNTER — NURSE ONLY (OUTPATIENT)
Dept: FAMILY MEDICINE CLINIC | Facility: CLINIC | Age: 67
End: 2017-11-10

## 2017-11-10 DIAGNOSIS — Z23 NEED FOR VACCINATION: Primary | ICD-10-CM

## 2017-11-10 PROCEDURE — 90686 IIV4 VACC NO PRSV 0.5 ML IM: CPT | Performed by: FAMILY MEDICINE

## 2017-11-10 PROCEDURE — 90732 PPSV23 VACC 2 YRS+ SUBQ/IM: CPT | Performed by: FAMILY MEDICINE

## 2017-11-10 PROCEDURE — G0008 ADMIN INFLUENZA VIRUS VAC: HCPCS | Performed by: FAMILY MEDICINE

## 2017-11-10 PROCEDURE — G0009 ADMIN PNEUMOCOCCAL VACCINE: HCPCS | Performed by: FAMILY MEDICINE

## 2018-01-02 ENCOUNTER — OFFICE VISIT (OUTPATIENT)
Dept: FAMILY MEDICINE CLINIC | Facility: CLINIC | Age: 68
End: 2018-01-02

## 2018-01-02 VITALS
HEART RATE: 84 BPM | BODY MASS INDEX: 33.18 KG/M2 | HEIGHT: 65.59 IN | DIASTOLIC BLOOD PRESSURE: 70 MMHG | WEIGHT: 204 LBS | SYSTOLIC BLOOD PRESSURE: 118 MMHG

## 2018-01-02 DIAGNOSIS — Z00.00 ENCOUNTER FOR ANNUAL HEALTH EXAMINATION: Primary | ICD-10-CM

## 2018-01-02 DIAGNOSIS — E11.9 CONTROLLED TYPE 2 DIABETES MELLITUS WITHOUT COMPLICATION, WITHOUT LONG-TERM CURRENT USE OF INSULIN (HCC): ICD-10-CM

## 2018-01-02 DIAGNOSIS — E78.2 MIXED HYPERLIPIDEMIA: ICD-10-CM

## 2018-01-02 DIAGNOSIS — G47.33 OSA (OBSTRUCTIVE SLEEP APNEA): ICD-10-CM

## 2018-01-02 DIAGNOSIS — M15.9 PRIMARY OSTEOARTHRITIS INVOLVING MULTIPLE JOINTS: ICD-10-CM

## 2018-01-02 DIAGNOSIS — I83.892 VARICOSE VEINS OF LEFT LEG WITH EDEMA: ICD-10-CM

## 2018-01-02 DIAGNOSIS — I10 ESSENTIAL HYPERTENSION WITH GOAL BLOOD PRESSURE LESS THAN 130/80: ICD-10-CM

## 2018-01-02 DIAGNOSIS — H61.23 BILATERAL IMPACTED CERUMEN: ICD-10-CM

## 2018-01-02 DIAGNOSIS — Z97.4 DOES USE HEARING AID: ICD-10-CM

## 2018-01-02 DIAGNOSIS — F32.4 MAJOR DEPRESSIVE DISORDER WITH SINGLE EPISODE, IN PARTIAL REMISSION (HCC): ICD-10-CM

## 2018-01-02 DIAGNOSIS — E66.9 OBESITY (BMI 30.0-34.9): ICD-10-CM

## 2018-01-02 PROCEDURE — G0438 PPPS, INITIAL VISIT: HCPCS | Performed by: FAMILY MEDICINE

## 2018-01-02 PROCEDURE — 99214 OFFICE O/P EST MOD 30 MIN: CPT | Performed by: FAMILY MEDICINE

## 2018-01-02 RX ORDER — DICLOFENAC SODIUM 75 MG/1
75 TABLET, DELAYED RELEASE ORAL DAILY
Qty: 30 TABLET | Refills: 5 | Status: SHIPPED | OUTPATIENT
Start: 2018-01-02 | End: 2018-05-22

## 2018-01-02 RX ORDER — LANCETS 28 GAUGE
EACH MISCELLANEOUS
Qty: 100 EACH | Refills: 2 | Status: SHIPPED | OUTPATIENT
Start: 2018-01-02 | End: 2018-06-27

## 2018-01-02 RX ORDER — ATORVASTATIN CALCIUM 10 MG/1
TABLET, FILM COATED ORAL
Qty: 30 TABLET | Refills: 5 | Status: SHIPPED | OUTPATIENT
Start: 2018-01-02 | End: 2018-05-22

## 2018-01-02 RX ORDER — BUPROPION HYDROCHLORIDE 300 MG/1
300 TABLET ORAL DAILY
Qty: 30 TABLET | Refills: 5 | Status: SHIPPED | OUTPATIENT
Start: 2018-01-02 | End: 2018-05-22

## 2018-01-02 RX ORDER — AMLODIPINE BESYLATE 5 MG/1
TABLET ORAL
Qty: 30 TABLET | Refills: 5 | Status: SHIPPED | OUTPATIENT
Start: 2018-01-02 | End: 2018-05-22

## 2018-01-02 RX ORDER — LOSARTAN POTASSIUM AND HYDROCHLOROTHIAZIDE 25; 100 MG/1; MG/1
1 TABLET ORAL
Qty: 30 TABLET | Refills: 5 | Status: SHIPPED | OUTPATIENT
Start: 2018-01-02 | End: 2018-05-22

## 2018-01-02 NOTE — PATIENT INSTRUCTIONS
SCHEDULING [facility] LAB APPOINTMENTS ONLINE    Lab appointments can now be scheduled online at [email or web address]    · Go to [email or web address]  · In Search type Lab  · Click \"Lab services\"  · Click \"Schedule Your Test Online\"  · Follow the prompts  · If you a member of your immediate family has had colon cancer, especially if their cancer occurred before they were 48years old. Prostate cancer tests:  The older way of looking for prostate cancer, the rectal exam, is no longer regarded as the best way to scre and urine tests. When you have no symptoms of illness, you should discuss the pros and cons of these and other tests with your healthcare provider. Each test involves some expense. What shots do I need?    The following shots are recommended for adults: expect your healthcare provider to advise you regularly on other ways to stay healthy. Some of these may include:   Substance use: Do not use tobacco or illegal drugs.  Avoid using alcohol while driving, swimming, boating, etc.   Diet and exercise: Try to m (BMI ³30 kg/m2)   • Previous elevated impaired FBS or GTT   … or any two of the following:   • Overweight (BMI ³25 but <30)   • Family history of diabetes   • Age 72 years or older   • History of gestational diabetes or birth of baby weighing more than 9 p Fecal Occult Blood   Covered Annually No results found for: FOB, OCCULTSTOOL No flowsheet data found.      Barium Enema-   uncomfortable but covered  Covered but uncomfortable   Glaucoma Screening      Ophthalmology Visit   Covered annually for Diabetics institutions for the mentally retarded   Persons who live in the same house as a HepB virus carrier   Homosexual men   Illicit injectable drug abusers     Tetanus Toxoid- Only covered with a cut with metal- TD and TDaP Not covered by Medicare Part B) No or

## 2018-01-02 NOTE — PROGRESS NOTES
HPI:   Lucy Ceja is a 78year old male who presents for a Medicare Initial Annual Wellness visit (Once after 12 month Medicare anniversary) .   1. Major depressive disorder with single episode, in partial remission ([facility] [address]  Doing well once weekly to use CPAP despite benefits discussed states he cannot tolerate it. Ultrafast CT of the heart 13.1 score on 10/3/13  Specialist  #1 gastroenterology Dr. Case Ann colonoscopy due 4/25/26.   #2 orthopedic surgeon Dr. Alma Guerrero  #3 optometrist Dr. Kris Strickland  #4 (SGPT)      17 - 63 U/L    Total Bilirubin      0.1 - 2.0 mg/dL    TOTAL PROTEIN      6.1 - 8.3 g/dL    Albumin      3.5 - 4.8 g/dL    Sodium      136 - 144 mmol/L    Potassium      3.6 - 5.1 mmol/L    Chloride      101 - 111 mmol/L    Carbon Dioxide, Tota He has Hearing problems based on screening of functional status.    Hearing Problems?: Yes                  Depression Screening (PHQ-2/PHQ-9): Over the LAST 2 WEEKS   Little interest or pleasure in doing things (over the last two weeks)?: No impacted cerumen    Wt Readings from Last 3 Encounters:  01/02/18 : 204 lb  08/14/17 : 201 lb 3.2 oz  07/10/17 : 206 lb     Last Cholesterol Labs:     Lab Results  Component Value Date   CHOLEST 108 02/02/2017   HDL 49 02/02/2017   LDL 36 02/02/2017   TRIG MG Oral Cap Take 1 capsule by mouth daily. ketoconazole (NIZORAL) 2 % External Cream APPLY A THIN FILM TO AFFECTED AREA(S) AS NEEDED EVERY DAY   Bioflavonoid Products (BIOFLEX) Oral Tab Take 1 tablet by mouth daily.    Cholecalciferol (VITAMIN D3) 1000 UN he does not drink alcohol or use drugs.      REVIEW OF SYSTEMS:   GENERAL: feels well otherwise  SKIN: denies any unusual skin lesions  EYES: denies blurred vision or double vision  HEENT: denies nasal congestion, sinus pain or ST  LUNGS: denies shortness o S1, S2 normal, no murmur, rub or gallop   Abdomen:   Soft, non-tender, bowel sounds active all four quadrants,  no masses, no organomegaly   Genitalia: Normal male   Rectal: Normal tone, normal prostate, no masses or tenderness   Extremities: Extremities n side effects refills given continue with weight loss and lifestyle changes  Obesity (BMI 30.0-34. 9)  Weight loss encouraged him low carbohydrates and regular exercise avoid processed foods.   Controlled type 2 diabetes mellitus without complication, without maintain a good energy level?: Daily Walks  How would you describe your daily physical activity?: Moderate  How would you describe your current health state?: Good  How do you maintain positive mental well-being?: Social Interaction;Puzzles; Visiting Family Pneumococcal 13 (Prevnar)  Covered Once after 65 07/28/2016 Please get once after your 65th birthday    Pneumococcal 23 (Pneumovax)  Covered Once after 65 11/10/2017 Please get once after your 65th birthday    Hepatitis B for Moderate/High Risk No vaccine MALB/CRE CALC (ug/mg)   Date Value   06/06/2014 11.9        LDL  Annually LDL-CHOLESTEROL (mg/dL (calc))   Date Value   07/15/2011 101     LDL CHOLESTROL (mg/dL)   Date Value   06/06/2014 46     LDL Cholesterol (mg/dL)   Date Value   02/02/2017 36    N

## 2018-02-19 ENCOUNTER — LAB ENCOUNTER (OUTPATIENT)
Dept: LAB | Age: 68
End: 2018-02-19
Attending: FAMILY MEDICINE
Payer: MEDICARE

## 2018-02-19 DIAGNOSIS — I10 ESSENTIAL HYPERTENSION WITH GOAL BLOOD PRESSURE LESS THAN 130/80: ICD-10-CM

## 2018-02-19 DIAGNOSIS — E78.2 MIXED HYPERLIPIDEMIA: ICD-10-CM

## 2018-02-19 DIAGNOSIS — E11.9 CONTROLLED TYPE 2 DIABETES MELLITUS WITHOUT COMPLICATION, WITHOUT LONG-TERM CURRENT USE OF INSULIN (HCC): ICD-10-CM

## 2018-02-19 LAB
ALBUMIN SERPL-MCNC: 3.9 G/DL (ref 3.5–4.8)
ALP LIVER SERPL-CCNC: 90 U/L (ref 45–117)
ALT SERPL-CCNC: 36 U/L (ref 17–63)
AST SERPL-CCNC: 25 U/L (ref 15–41)
BILIRUB SERPL-MCNC: 0.5 MG/DL (ref 0.1–2)
BUN BLD-MCNC: 18 MG/DL (ref 8–20)
CALCIUM BLD-MCNC: 9.2 MG/DL (ref 8.3–10.3)
CHLORIDE: 104 MMOL/L (ref 101–111)
CHOLEST SMN-MCNC: 124 MG/DL (ref ?–200)
CO2: 29 MMOL/L (ref 22–32)
CREAT BLD-MCNC: 0.9 MG/DL (ref 0.7–1.3)
CREAT UR-SCNC: 179 MG/DL
EST. AVERAGE GLUCOSE BLD GHB EST-MCNC: 128 MG/DL (ref 68–126)
GLUCOSE BLD-MCNC: 102 MG/DL (ref 70–99)
HBA1C MFR BLD HPLC: 6.1 % (ref ?–5.7)
HDLC SERPL-MCNC: 51 MG/DL (ref 45–?)
HDLC SERPL: 2.43 {RATIO} (ref ?–4.97)
LDLC SERPL CALC-MCNC: 49 MG/DL (ref ?–130)
M PROTEIN MFR SERPL ELPH: 7.9 G/DL (ref 6.1–8.3)
MICROALBUMIN UR-MCNC: 1.42 MG/DL
MICROALBUMIN/CREAT 24H UR-RTO: 7.9 UG/MG (ref ?–30)
NONHDLC SERPL-MCNC: 73 MG/DL (ref ?–130)
POTASSIUM SERPL-SCNC: 3.9 MMOL/L (ref 3.6–5.1)
SODIUM SERPL-SCNC: 140 MMOL/L (ref 136–144)
TRIGL SERPL-MCNC: 119 MG/DL (ref ?–150)
VLDLC SERPL CALC-MCNC: 24 MG/DL (ref 5–40)

## 2018-02-19 PROCEDURE — 82043 UR ALBUMIN QUANTITATIVE: CPT

## 2018-02-19 PROCEDURE — 80061 LIPID PANEL: CPT

## 2018-02-19 PROCEDURE — 36415 COLL VENOUS BLD VENIPUNCTURE: CPT

## 2018-02-19 PROCEDURE — 82570 ASSAY OF URINE CREATININE: CPT

## 2018-02-19 PROCEDURE — 80053 COMPREHEN METABOLIC PANEL: CPT

## 2018-02-19 PROCEDURE — 83036 HEMOGLOBIN GLYCOSYLATED A1C: CPT

## 2018-02-20 NOTE — PROGRESS NOTES
Hemoglobin A1c at a 6.1 which is increased from last time exercise daily and concentrate on weight loss. Repeat hemoglobin A1c and CMP in 6 months.   Rest of labs are normal.  Order future tests/medications sent to my chart

## 2018-02-21 ENCOUNTER — TELEPHONE (OUTPATIENT)
Dept: FAMILY MEDICINE CLINIC | Facility: CLINIC | Age: 68
End: 2018-02-21

## 2018-02-21 DIAGNOSIS — I10 ESSENTIAL HYPERTENSION WITH GOAL BLOOD PRESSURE LESS THAN 130/80: ICD-10-CM

## 2018-02-21 DIAGNOSIS — E11.9 CONTROLLED TYPE 2 DIABETES MELLITUS WITHOUT COMPLICATION, WITHOUT LONG-TERM CURRENT USE OF INSULIN (HCC): Primary | ICD-10-CM

## 2018-02-21 NOTE — TELEPHONE ENCOUNTER
----- Message from Maria E De Paz PA-C sent at 2/19/2018  8:30 PM CST -----  Hemoglobin A1c at a 6.1 which is increased from last time exercise daily and concentrate on weight loss. Repeat hemoglobin A1c and CMP in 6 months.   Rest of labs are normal.

## 2018-03-05 ENCOUNTER — TELEPHONE (OUTPATIENT)
Dept: FAMILY MEDICINE CLINIC | Facility: CLINIC | Age: 68
End: 2018-03-05

## 2018-03-05 NOTE — TELEPHONE ENCOUNTER
Per Stephen Banuelos PA-C, her reply to their \"Message to Prescriber\" was successfully faxed back to [place].

## 2018-05-22 DIAGNOSIS — E78.2 MIXED HYPERLIPIDEMIA: ICD-10-CM

## 2018-05-22 DIAGNOSIS — M15.9 PRIMARY OSTEOARTHRITIS INVOLVING MULTIPLE JOINTS: ICD-10-CM

## 2018-05-22 DIAGNOSIS — F32.4 MAJOR DEPRESSIVE DISORDER WITH SINGLE EPISODE, IN PARTIAL REMISSION (HCC): ICD-10-CM

## 2018-05-22 DIAGNOSIS — I10 ESSENTIAL HYPERTENSION WITH GOAL BLOOD PRESSURE LESS THAN 130/80: ICD-10-CM

## 2018-05-22 DIAGNOSIS — E11.9 CONTROLLED TYPE 2 DIABETES MELLITUS WITHOUT COMPLICATION, WITHOUT LONG-TERM CURRENT USE OF INSULIN (HCC): ICD-10-CM

## 2018-05-23 RX ORDER — DICLOFENAC SODIUM 75 MG/1
TABLET, DELAYED RELEASE ORAL
Qty: 90 TABLET | Refills: 0 | Status: SHIPPED | OUTPATIENT
Start: 2018-05-23 | End: 2018-08-20

## 2018-05-23 RX ORDER — LOSARTAN POTASSIUM AND HYDROCHLOROTHIAZIDE 25; 100 MG/1; MG/1
TABLET ORAL
Qty: 90 TABLET | Refills: 0 | Status: SHIPPED | OUTPATIENT
Start: 2018-05-23 | End: 2018-08-20

## 2018-05-23 RX ORDER — BUPROPION HYDROCHLORIDE 300 MG/1
TABLET ORAL
Qty: 90 TABLET | Refills: 0 | Status: SHIPPED | OUTPATIENT
Start: 2018-05-23 | End: 2018-08-20

## 2018-05-23 RX ORDER — AMLODIPINE BESYLATE 5 MG/1
TABLET ORAL
Qty: 90 TABLET | Refills: 0 | Status: SHIPPED | OUTPATIENT
Start: 2018-05-23 | End: 2018-08-20

## 2018-05-23 RX ORDER — ATORVASTATIN CALCIUM 10 MG/1
TABLET, FILM COATED ORAL
Qty: 90 TABLET | Refills: 0 | Status: SHIPPED | OUTPATIENT
Start: 2018-05-23 | End: 2018-08-20

## 2018-06-27 DIAGNOSIS — E11.9 CONTROLLED TYPE 2 DIABETES MELLITUS WITHOUT COMPLICATION, WITHOUT LONG-TERM CURRENT USE OF INSULIN (HCC): ICD-10-CM

## 2018-06-27 RX ORDER — LANCETS 28 GAUGE
EACH MISCELLANEOUS
Qty: 200 EACH | Refills: 0 | Status: SHIPPED | OUTPATIENT
Start: 2018-06-27 | End: 2019-05-30 | Stop reason: ALTCHOICE

## 2018-07-03 ENCOUNTER — TELEPHONE (OUTPATIENT)
Dept: FAMILY MEDICINE CLINIC | Facility: CLINIC | Age: 68
End: 2018-07-03

## 2018-07-09 ENCOUNTER — TELEPHONE (OUTPATIENT)
Dept: FAMILY MEDICINE CLINIC | Facility: CLINIC | Age: 68
End: 2018-07-09

## 2018-07-09 NOTE — TELEPHONE ENCOUNTER
Gina Rankin from [place] is calling to see if Hersanay Gage received a form from her regarding Olga Romero, she needs Damian Gage to fill it out and fax it back to her, it is for Medicare, please fax to [phone]

## 2018-08-09 ENCOUNTER — MED REC SCAN ONLY (OUTPATIENT)
Dept: FAMILY MEDICINE CLINIC | Facility: CLINIC | Age: 68
End: 2018-08-09

## 2018-08-20 DIAGNOSIS — F32.4 MAJOR DEPRESSIVE DISORDER WITH SINGLE EPISODE, IN PARTIAL REMISSION (HCC): ICD-10-CM

## 2018-08-20 DIAGNOSIS — M15.9 PRIMARY OSTEOARTHRITIS INVOLVING MULTIPLE JOINTS: ICD-10-CM

## 2018-08-20 DIAGNOSIS — E11.9 CONTROLLED TYPE 2 DIABETES MELLITUS WITHOUT COMPLICATION, WITHOUT LONG-TERM CURRENT USE OF INSULIN (HCC): ICD-10-CM

## 2018-08-20 DIAGNOSIS — I10 ESSENTIAL HYPERTENSION WITH GOAL BLOOD PRESSURE LESS THAN 130/80: ICD-10-CM

## 2018-08-20 DIAGNOSIS — E78.2 MIXED HYPERLIPIDEMIA: ICD-10-CM

## 2018-08-20 RX ORDER — ATORVASTATIN CALCIUM 10 MG/1
TABLET, FILM COATED ORAL
Qty: 90 TABLET | Refills: 1 | Status: SHIPPED | OUTPATIENT
Start: 2018-08-20 | End: 2019-02-15

## 2018-08-20 RX ORDER — BUPROPION HYDROCHLORIDE 300 MG/1
TABLET ORAL
Qty: 30 TABLET | Refills: 0 | Status: SHIPPED | OUTPATIENT
Start: 2018-08-20 | End: 2018-09-18

## 2018-08-20 RX ORDER — LOSARTAN POTASSIUM AND HYDROCHLOROTHIAZIDE 25; 100 MG/1; MG/1
TABLET ORAL
Qty: 30 TABLET | Refills: 0 | Status: SHIPPED | OUTPATIENT
Start: 2018-08-20 | End: 2018-09-18

## 2018-08-20 RX ORDER — DICLOFENAC SODIUM 75 MG/1
TABLET, DELAYED RELEASE ORAL
Qty: 90 TABLET | Refills: 0 | Status: SHIPPED | OUTPATIENT
Start: 2018-08-20 | End: 2018-11-17

## 2018-08-20 RX ORDER — AMLODIPINE BESYLATE 5 MG/1
TABLET ORAL
Qty: 30 TABLET | Refills: 0 | Status: SHIPPED | OUTPATIENT
Start: 2018-08-20 | End: 2018-09-18

## 2018-08-20 RX ORDER — AMLODIPINE BESYLATE 5 MG/1
TABLET ORAL
Qty: 90 TABLET | Refills: 0 | OUTPATIENT
Start: 2018-08-20

## 2018-08-20 RX ORDER — LOSARTAN POTASSIUM AND HYDROCHLOROTHIAZIDE 25; 100 MG/1; MG/1
TABLET ORAL
Qty: 90 TABLET | Refills: 0 | OUTPATIENT
Start: 2018-08-20

## 2018-08-20 RX ORDER — BUPROPION HYDROCHLORIDE 300 MG/1
TABLET ORAL
Qty: 90 TABLET | Refills: 0 | OUTPATIENT
Start: 2018-08-20

## 2018-08-20 NOTE — TELEPHONE ENCOUNTER
Rxs approved qty 30 NR  Patient is due for ov and labs   Please call to schedule appt and remind patient to complete labs prior to ov  [phone] (home)

## 2018-08-22 ENCOUNTER — LAB ENCOUNTER (OUTPATIENT)
Dept: LAB | Age: 68
End: 2018-08-22
Attending: FAMILY MEDICINE
Payer: MEDICARE

## 2018-08-22 DIAGNOSIS — E11.9 CONTROLLED TYPE 2 DIABETES MELLITUS WITHOUT COMPLICATION, WITHOUT LONG-TERM CURRENT USE OF INSULIN (HCC): ICD-10-CM

## 2018-08-22 DIAGNOSIS — I10 ESSENTIAL HYPERTENSION WITH GOAL BLOOD PRESSURE LESS THAN 130/80: ICD-10-CM

## 2018-08-22 LAB
ALBUMIN SERPL-MCNC: 3.9 G/DL (ref 3.5–4.8)
ALBUMIN/GLOB SERPL: 1.1 {RATIO} (ref 1–2)
ALP LIVER SERPL-CCNC: 95 U/L (ref 45–117)
ALT SERPL-CCNC: 42 U/L (ref 17–63)
ANION GAP SERPL CALC-SCNC: 7 MMOL/L (ref 0–18)
AST SERPL-CCNC: 25 U/L (ref 15–41)
BILIRUB SERPL-MCNC: 0.4 MG/DL (ref 0.1–2)
BUN BLD-MCNC: 15 MG/DL (ref 8–20)
BUN/CREAT SERPL: 15 (ref 10–20)
CALCIUM BLD-MCNC: 9 MG/DL (ref 8.3–10.3)
CHLORIDE SERPL-SCNC: 103 MMOL/L (ref 101–111)
CO2 SERPL-SCNC: 29 MMOL/L (ref 22–32)
CREAT BLD-MCNC: 1 MG/DL (ref 0.7–1.3)
EST. AVERAGE GLUCOSE BLD GHB EST-MCNC: 126 MG/DL (ref 68–126)
GLOBULIN PLAS-MCNC: 3.6 G/DL (ref 2.5–4)
GLUCOSE BLD-MCNC: 109 MG/DL (ref 70–99)
HBA1C MFR BLD HPLC: 6 % (ref ?–5.7)
M PROTEIN MFR SERPL ELPH: 7.5 G/DL (ref 6.1–8.3)
OSMOLALITY SERPL CALC.SUM OF ELEC: 289 MOSM/KG (ref 275–295)
POTASSIUM SERPL-SCNC: 4.1 MMOL/L (ref 3.6–5.1)
SODIUM SERPL-SCNC: 139 MMOL/L (ref 136–144)

## 2018-08-22 PROCEDURE — 36415 COLL VENOUS BLD VENIPUNCTURE: CPT

## 2018-08-22 PROCEDURE — 83036 HEMOGLOBIN GLYCOSYLATED A1C: CPT

## 2018-08-22 PROCEDURE — 80053 COMPREHEN METABOLIC PANEL: CPT

## 2018-09-18 DIAGNOSIS — E11.9 CONTROLLED TYPE 2 DIABETES MELLITUS WITHOUT COMPLICATION, WITHOUT LONG-TERM CURRENT USE OF INSULIN (HCC): ICD-10-CM

## 2018-09-18 DIAGNOSIS — F32.4 MAJOR DEPRESSIVE DISORDER WITH SINGLE EPISODE, IN PARTIAL REMISSION (HCC): ICD-10-CM

## 2018-09-18 DIAGNOSIS — I10 ESSENTIAL HYPERTENSION WITH GOAL BLOOD PRESSURE LESS THAN 130/80: ICD-10-CM

## 2018-09-18 RX ORDER — LOSARTAN POTASSIUM AND HYDROCHLOROTHIAZIDE 25; 100 MG/1; MG/1
TABLET ORAL
Qty: 15 TABLET | Refills: 0 | Status: SHIPPED | OUTPATIENT
Start: 2018-09-18 | End: 2018-09-25

## 2018-09-18 RX ORDER — BUPROPION HYDROCHLORIDE 300 MG/1
TABLET ORAL
Qty: 15 TABLET | Refills: 0 | Status: SHIPPED | OUTPATIENT
Start: 2018-09-18 | End: 2018-09-25

## 2018-09-18 RX ORDER — AMLODIPINE BESYLATE 5 MG/1
TABLET ORAL
Qty: 15 TABLET | Refills: 0 | Status: SHIPPED | OUTPATIENT
Start: 2018-09-18 | End: 2018-09-25

## 2018-09-20 DIAGNOSIS — E11.9 CONTROLLED TYPE 2 DIABETES MELLITUS WITHOUT COMPLICATION, WITHOUT LONG-TERM CURRENT USE OF INSULIN (HCC): ICD-10-CM

## 2018-09-20 RX ORDER — BLOOD-GLUCOSE METER
KIT MISCELLANEOUS
Qty: 100 STRIP | Refills: 1 | Status: SHIPPED | OUTPATIENT
Start: 2018-09-20 | End: 2019-03-02

## 2018-09-25 ENCOUNTER — OFFICE VISIT (OUTPATIENT)
Dept: FAMILY MEDICINE CLINIC | Facility: CLINIC | Age: 68
End: 2018-09-25
Payer: MEDICARE

## 2018-09-25 VITALS
WEIGHT: 211 LBS | HEART RATE: 88 BPM | BODY MASS INDEX: 34.73 KG/M2 | SYSTOLIC BLOOD PRESSURE: 108 MMHG | DIASTOLIC BLOOD PRESSURE: 60 MMHG | TEMPERATURE: 98 F | HEIGHT: 65.32 IN

## 2018-09-25 DIAGNOSIS — Z23 FLU VACCINE NEED: ICD-10-CM

## 2018-09-25 DIAGNOSIS — I10 ESSENTIAL HYPERTENSION WITH GOAL BLOOD PRESSURE LESS THAN 130/80: ICD-10-CM

## 2018-09-25 DIAGNOSIS — B35.1 TOENAIL FUNGUS: ICD-10-CM

## 2018-09-25 DIAGNOSIS — F32.4 MAJOR DEPRESSIVE DISORDER WITH SINGLE EPISODE, IN PARTIAL REMISSION (HCC): ICD-10-CM

## 2018-09-25 DIAGNOSIS — E11.9 CONTROLLED TYPE 2 DIABETES MELLITUS WITHOUT COMPLICATION, WITHOUT LONG-TERM CURRENT USE OF INSULIN (HCC): Primary | ICD-10-CM

## 2018-09-25 PROCEDURE — 90653 IIV ADJUVANT VACCINE IM: CPT | Performed by: FAMILY MEDICINE

## 2018-09-25 PROCEDURE — G0008 ADMIN INFLUENZA VIRUS VAC: HCPCS | Performed by: FAMILY MEDICINE

## 2018-09-25 PROCEDURE — 99214 OFFICE O/P EST MOD 30 MIN: CPT | Performed by: FAMILY MEDICINE

## 2018-09-25 RX ORDER — AMLODIPINE BESYLATE 5 MG/1
5 TABLET ORAL
Qty: 90 TABLET | Refills: 1 | Status: SHIPPED | OUTPATIENT
Start: 2018-09-25 | End: 2019-03-08

## 2018-09-25 RX ORDER — LOSARTAN POTASSIUM AND HYDROCHLOROTHIAZIDE 25; 100 MG/1; MG/1
1 TABLET ORAL
Qty: 90 TABLET | Refills: 1 | Status: SHIPPED | OUTPATIENT
Start: 2018-09-25 | End: 2019-03-04

## 2018-09-25 RX ORDER — BUPROPION HYDROCHLORIDE 300 MG/1
300 TABLET ORAL
Qty: 90 TABLET | Refills: 1 | Status: SHIPPED | OUTPATIENT
Start: 2018-09-25 | End: 2019-03-04

## 2018-09-25 NOTE — PROGRESS NOTES
HPI:   Rosalind Arnold is a 80year old male who presents for recheck of his diabetes. Patient’s FBS have been  97. Last visit with ophthalmologist was UTD. Pt has been checking his feet on a regular basis. Pt denies any tingling of the feet. URINE      mg/dL  1.42   CREATININE UR RANDOM      mg/dL  179.00   MALB/CRE CALC      <=30.0 ug/mg  7.9   HEMOGLOBIN A1c      <5.7 % 6.0 (H) 6.1 (H)   ESTIMATED AVERAGE GLUCOSE      68 - 126 mg/dL 126 128 (H)         Immunization History  Administered apply Misc TEST TWICE DAILY AS DIRECTED Disp: 200 each Rfl: 0   lidocaine 5 % External Patch PLACE 1 PATCH ONTO THE SKIN DAILY.  PUT ON FOR 12 HOURS THEN OFF FOR 12 HOURS Disp: 30 patch Rfl: 5   DiphenhydrAMINE HCl 25 MG Oral Tab Take 25 mg by mouth nightly unspecified  No date: Psychosexual dysfunction with inhibited sexual excitement   Past Surgical History:  01/01/05: COLONOSCOPY      Comment:  complete  No date: COLONOSCOPY  04/25/2016: COLONOSCOPY,DIAGNOSTIC      Comment:  Due 04/25/2026; HARPREET Chaparro or tenderness  EXTREMITIES: no cyanosis, clubbing or edema; Pulsation pedal pulse exam of both lower legs/feet is normal as well.   Bilateral barefoot skin diabetic exam is normal, visualized feet and the appearance is normal except toenail fungus on great DAILY WITH MEALS  Dispense: 180 tablet; Refill: 1    2.  Major depressive disorder with single episode, in partial remission ([facility] [address]  Discussed if any problems call office immediately especially if gets increased depression, anxiety or any homicidal or suicid

## 2018-09-26 PROBLEM — H61.23 BILATERAL IMPACTED CERUMEN: Status: RESOLVED | Noted: 2018-01-02 | Resolved: 2018-09-26

## 2018-10-31 ENCOUNTER — PATIENT OUTREACH (OUTPATIENT)
Dept: CASE MANAGEMENT | Age: 68
End: 2018-10-31

## 2018-11-17 DIAGNOSIS — M15.9 PRIMARY OSTEOARTHRITIS INVOLVING MULTIPLE JOINTS: ICD-10-CM

## 2018-11-19 RX ORDER — DICLOFENAC SODIUM 75 MG/1
TABLET, DELAYED RELEASE ORAL
Qty: 90 TABLET | Refills: 0 | Status: SHIPPED | OUTPATIENT
Start: 2018-11-19 | End: 2019-02-07

## 2018-11-30 ENCOUNTER — PATIENT OUTREACH (OUTPATIENT)
Dept: CASE MANAGEMENT | Age: 68
End: 2018-11-30

## 2018-11-30 NOTE — PROGRESS NOTES
Called pt to introduce CCM program. Pt is interested but requested more information. Letter and program details sent. Will follow up in a month.

## 2019-01-10 ENCOUNTER — PATIENT OUTREACH (OUTPATIENT)
Dept: CASE MANAGEMENT | Age: 69
End: 2019-01-10

## 2019-01-10 NOTE — PROGRESS NOTES
Called pt to f/u on info mailed re: CCM program, pt stated he was on his way out the door and asked for a call back.

## 2019-01-24 ENCOUNTER — PATIENT OUTREACH (OUTPATIENT)
Dept: CASE MANAGEMENT | Age: 69
End: 2019-01-24

## 2019-01-24 NOTE — PROGRESS NOTES
Called pt to follow up on information mailed re: CCM program.  Pt stated he has not yet had a chance to read through it and asked for a call back next week.

## 2019-01-31 ENCOUNTER — PATIENT OUTREACH (OUTPATIENT)
Dept: CASE MANAGEMENT | Age: 69
End: 2019-01-31

## 2019-02-07 DIAGNOSIS — M15.9 PRIMARY OSTEOARTHRITIS INVOLVING MULTIPLE JOINTS: ICD-10-CM

## 2019-02-07 RX ORDER — DICLOFENAC SODIUM 75 MG/1
TABLET, DELAYED RELEASE ORAL
Qty: 90 TABLET | Refills: 0 | Status: SHIPPED | OUTPATIENT
Start: 2019-02-07 | End: 2019-05-18

## 2019-02-15 DIAGNOSIS — E78.2 MIXED HYPERLIPIDEMIA: ICD-10-CM

## 2019-02-15 RX ORDER — ATORVASTATIN CALCIUM 10 MG/1
TABLET, FILM COATED ORAL
Qty: 90 TABLET | Refills: 0 | Status: SHIPPED | OUTPATIENT
Start: 2019-02-15 | End: 2019-03-02

## 2019-03-02 DIAGNOSIS — E11.9 CONTROLLED TYPE 2 DIABETES MELLITUS WITHOUT COMPLICATION, WITHOUT LONG-TERM CURRENT USE OF INSULIN (HCC): ICD-10-CM

## 2019-03-02 DIAGNOSIS — E78.2 MIXED HYPERLIPIDEMIA: ICD-10-CM

## 2019-03-04 DIAGNOSIS — I10 ESSENTIAL HYPERTENSION WITH GOAL BLOOD PRESSURE LESS THAN 130/80: ICD-10-CM

## 2019-03-04 DIAGNOSIS — F32.4 MAJOR DEPRESSIVE DISORDER WITH SINGLE EPISODE, IN PARTIAL REMISSION (HCC): ICD-10-CM

## 2019-03-04 DIAGNOSIS — E11.9 CONTROLLED TYPE 2 DIABETES MELLITUS WITHOUT COMPLICATION, WITHOUT LONG-TERM CURRENT USE OF INSULIN (HCC): ICD-10-CM

## 2019-03-04 RX ORDER — ATORVASTATIN CALCIUM 10 MG/1
TABLET, FILM COATED ORAL
Qty: 30 TABLET | Refills: 0 | Status: SHIPPED | OUTPATIENT
Start: 2019-03-04 | End: 2019-09-03

## 2019-03-04 RX ORDER — BLOOD-GLUCOSE METER
KIT MISCELLANEOUS
Qty: 100 STRIP | Refills: 1 | Status: SHIPPED | OUTPATIENT
Start: 2019-03-04 | End: 2019-09-03

## 2019-03-04 NOTE — TELEPHONE ENCOUNTER
Atorvastatin approved qty 30 NR  Last lipid panel was over a year ago  aYndy,please advise labs for patient

## 2019-03-05 ENCOUNTER — LAB ENCOUNTER (OUTPATIENT)
Dept: LAB | Age: 69
End: 2019-03-05
Attending: FAMILY MEDICINE
Payer: MEDICARE

## 2019-03-05 ENCOUNTER — OFFICE VISIT (OUTPATIENT)
Dept: FAMILY MEDICINE CLINIC | Facility: CLINIC | Age: 69
End: 2019-03-05
Payer: MEDICARE

## 2019-03-05 VITALS
HEART RATE: 80 BPM | HEIGHT: 65.39 IN | WEIGHT: 214 LBS | SYSTOLIC BLOOD PRESSURE: 118 MMHG | BODY MASS INDEX: 35.23 KG/M2 | DIASTOLIC BLOOD PRESSURE: 70 MMHG

## 2019-03-05 DIAGNOSIS — Z12.5 SCREENING FOR PROSTATE CANCER: ICD-10-CM

## 2019-03-05 DIAGNOSIS — Z13.6 SCREENING FOR CARDIOVASCULAR CONDITION: ICD-10-CM

## 2019-03-05 DIAGNOSIS — Z13.0 SCREENING FOR DEFICIENCY ANEMIA: ICD-10-CM

## 2019-03-05 DIAGNOSIS — I10 ESSENTIAL HYPERTENSION WITH GOAL BLOOD PRESSURE LESS THAN 130/80: ICD-10-CM

## 2019-03-05 DIAGNOSIS — M15.9 PRIMARY OSTEOARTHRITIS INVOLVING MULTIPLE JOINTS: ICD-10-CM

## 2019-03-05 DIAGNOSIS — E78.2 MIXED HYPERLIPIDEMIA: ICD-10-CM

## 2019-03-05 DIAGNOSIS — B35.1 TOENAIL FUNGUS: ICD-10-CM

## 2019-03-05 DIAGNOSIS — Z97.4 DOES USE HEARING AID: ICD-10-CM

## 2019-03-05 DIAGNOSIS — G47.33 OSA (OBSTRUCTIVE SLEEP APNEA): ICD-10-CM

## 2019-03-05 DIAGNOSIS — E11.9 CONTROLLED TYPE 2 DIABETES MELLITUS WITHOUT COMPLICATION, WITHOUT LONG-TERM CURRENT USE OF INSULIN (HCC): ICD-10-CM

## 2019-03-05 DIAGNOSIS — Z00.00 ENCOUNTER FOR ANNUAL HEALTH EXAMINATION: Primary | ICD-10-CM

## 2019-03-05 DIAGNOSIS — F32.5 DEPRESSION, MAJOR, IN REMISSION (HCC): ICD-10-CM

## 2019-03-05 DIAGNOSIS — E66.01 CLASS 2 SEVERE OBESITY DUE TO EXCESS CALORIES WITH SERIOUS COMORBIDITY IN ADULT, UNSPECIFIED BMI (HCC): ICD-10-CM

## 2019-03-05 DIAGNOSIS — Z13.1 SCREENING FOR DIABETES MELLITUS (DM): ICD-10-CM

## 2019-03-05 PROBLEM — E66.9 OBESITY (BMI 30.0-34.9): Status: RESOLVED | Noted: 2017-02-06 | Resolved: 2019-03-05

## 2019-03-05 PROBLEM — E66.812 CLASS 2 SEVERE OBESITY DUE TO EXCESS CALORIES WITH SERIOUS COMORBIDITY IN ADULT (HCC): Status: ACTIVE | Noted: 2019-03-05

## 2019-03-05 PROBLEM — E66.811 OBESITY (BMI 30.0-34.9): Status: RESOLVED | Noted: 2017-02-06 | Resolved: 2019-03-05

## 2019-03-05 LAB
ALBUMIN SERPL-MCNC: 4.2 G/DL (ref 3.4–5)
ALBUMIN/GLOB SERPL: 1.1 {RATIO} (ref 1–2)
ALP LIVER SERPL-CCNC: 89 U/L (ref 45–117)
ALT SERPL-CCNC: 44 U/L (ref 16–61)
ANION GAP SERPL CALC-SCNC: 8 MMOL/L (ref 0–18)
AST SERPL-CCNC: 29 U/L (ref 15–37)
BASOPHILS # BLD AUTO: 0.06 X10(3) UL (ref 0–0.2)
BASOPHILS NFR BLD AUTO: 0.7 %
BILIRUB SERPL-MCNC: 0.5 MG/DL (ref 0.1–2)
BUN BLD-MCNC: 15 MG/DL (ref 7–18)
BUN/CREAT SERPL: 14.9 (ref 10–20)
CALCIUM BLD-MCNC: 9.1 MG/DL (ref 8.5–10.1)
CHLORIDE SERPL-SCNC: 103 MMOL/L (ref 98–107)
CHOLEST SMN-MCNC: 131 MG/DL (ref ?–200)
CO2 SERPL-SCNC: 27 MMOL/L (ref 21–32)
COMPLEXED PSA SERPL-MCNC: 2.64 NG/ML (ref ?–4)
CREAT BLD-MCNC: 1.01 MG/DL (ref 0.7–1.3)
CREAT UR-SCNC: 134 MG/DL
DEPRECATED RDW RBC AUTO: 45.3 FL (ref 35.1–46.3)
EOSINOPHIL # BLD AUTO: 0.42 X10(3) UL (ref 0–0.7)
EOSINOPHIL NFR BLD AUTO: 4.6 %
ERYTHROCYTE [DISTWIDTH] IN BLOOD BY AUTOMATED COUNT: 13.9 % (ref 11–15)
EST. AVERAGE GLUCOSE BLD GHB EST-MCNC: 131 MG/DL (ref 68–126)
GLOBULIN PLAS-MCNC: 3.9 G/DL (ref 2.8–4.4)
GLUCOSE BLD-MCNC: 96 MG/DL (ref 70–99)
HBA1C MFR BLD HPLC: 6.2 % (ref ?–5.7)
HCT VFR BLD AUTO: 49.4 % (ref 39–53)
HDLC SERPL-MCNC: 49 MG/DL (ref 40–59)
HGB BLD-MCNC: 16.2 G/DL (ref 13–17.5)
IMM GRANULOCYTES # BLD AUTO: 0.03 X10(3) UL (ref 0–1)
IMM GRANULOCYTES NFR BLD: 0.3 %
LDLC SERPL CALC-MCNC: 53 MG/DL (ref ?–100)
LYMPHOCYTES # BLD AUTO: 3.05 X10(3) UL (ref 1–4)
LYMPHOCYTES NFR BLD AUTO: 33.4 %
M PROTEIN MFR SERPL ELPH: 8.1 G/DL (ref 6.4–8.2)
MCH RBC QN AUTO: 29.6 PG (ref 26–34)
MCHC RBC AUTO-ENTMCNC: 32.8 G/DL (ref 31–37)
MCV RBC AUTO: 90.1 FL (ref 80–100)
MICROALBUMIN UR-MCNC: 1.17 MG/DL
MICROALBUMIN/CREAT 24H UR-RTO: 8.7 UG/MG (ref ?–30)
MONOCYTES # BLD AUTO: 0.74 X10(3) UL (ref 0.1–1)
MONOCYTES NFR BLD AUTO: 8.1 %
NEUTROPHILS # BLD AUTO: 4.83 X10 (3) UL (ref 1.5–7.7)
NEUTROPHILS # BLD AUTO: 4.83 X10(3) UL (ref 1.5–7.7)
NEUTROPHILS NFR BLD AUTO: 52.9 %
NONHDLC SERPL-MCNC: 82 MG/DL (ref ?–130)
OSMOLALITY SERPL CALC.SUM OF ELEC: 287 MOSM/KG (ref 275–295)
PLATELET # BLD AUTO: 330 10(3)UL (ref 150–450)
POTASSIUM SERPL-SCNC: 3.7 MMOL/L (ref 3.5–5.1)
RBC # BLD AUTO: 5.48 X10(6)UL (ref 3.8–5.8)
SODIUM SERPL-SCNC: 138 MMOL/L (ref 136–145)
TRIGL SERPL-MCNC: 147 MG/DL (ref 30–149)
TSI SER-ACNC: 3.81 MIU/ML (ref 0.36–3.74)
VLDLC SERPL CALC-MCNC: 29 MG/DL (ref 0–30)
WBC # BLD AUTO: 9.1 X10(3) UL (ref 4–11)

## 2019-03-05 PROCEDURE — 82570 ASSAY OF URINE CREATININE: CPT

## 2019-03-05 PROCEDURE — 85025 COMPLETE CBC W/AUTO DIFF WBC: CPT

## 2019-03-05 PROCEDURE — 84439 ASSAY OF FREE THYROXINE: CPT

## 2019-03-05 PROCEDURE — 80061 LIPID PANEL: CPT

## 2019-03-05 PROCEDURE — G0439 PPPS, SUBSEQ VISIT: HCPCS | Performed by: FAMILY MEDICINE

## 2019-03-05 PROCEDURE — 83036 HEMOGLOBIN GLYCOSYLATED A1C: CPT

## 2019-03-05 PROCEDURE — 99213 OFFICE O/P EST LOW 20 MIN: CPT | Performed by: FAMILY MEDICINE

## 2019-03-05 PROCEDURE — 82043 UR ALBUMIN QUANTITATIVE: CPT

## 2019-03-05 PROCEDURE — 80053 COMPREHEN METABOLIC PANEL: CPT

## 2019-03-05 PROCEDURE — 84443 ASSAY THYROID STIM HORMONE: CPT

## 2019-03-05 PROCEDURE — 36415 COLL VENOUS BLD VENIPUNCTURE: CPT

## 2019-03-05 RX ORDER — LOSARTAN POTASSIUM AND HYDROCHLOROTHIAZIDE 25; 100 MG/1; MG/1
TABLET ORAL
Qty: 90 TABLET | Refills: 0 | Status: SHIPPED | OUTPATIENT
Start: 2019-03-05 | End: 2019-05-13

## 2019-03-05 RX ORDER — BUPROPION HYDROCHLORIDE 300 MG/1
TABLET ORAL
Qty: 90 TABLET | Refills: 0 | Status: SHIPPED | OUTPATIENT
Start: 2019-03-05 | End: 2019-08-29

## 2019-03-05 NOTE — PROGRESS NOTES
Obesity  HPI:   Yaneth Couch is a 80year old male who presents for a Medicare Subsequent Annual Wellness visit (Pt already had Initial Annual Wellness).     1. Controlled type 2 diabetes mellitus without complication, without long-term current use o relationship with his wife. 8. Does use hearing aid  Doing well with hearing aids  9.  Varicose veins of left leg with edema  Compression stockings not using anymore   Leg edema has been much better     .  Ultrafast CT of the heart 13.1 score on 10/3/13 problems based on screening of functional status.    Hearing Problems?: Yes                  Depression Screening (PHQ-2/PHQ-9): Over the LAST 2 WEEKS   Little interest or pleasure in doing things (over the last two weeks)?: Not at all  Feeling down, depres remission ([facility] [address]     Does use hearing aid    Wt Readings from Last 3 Encounters:  03/05/19 : 214 lb  09/25/18 : 211 lb  01/02/18 : 204 lb     Last Cholesterol Labs:   Lab Results   Component Value Date    CHOLEST 124 02/19/2018    HDL 51 02/19/2018    LDL 4 1 capsule by mouth daily. Turmeric 500 MG Oral Cap Take 1 capsule by mouth daily.    ketoconazole (NIZORAL) 2 % External Cream APPLY A THIN FILM TO AFFECTED AREA(S) AS NEEDED EVERY DAY   Bioflavonoid Products (BIOFLEX) Oral Tab Take 1 tablet by mouth hasmukh used smokeless tobacco. He reports that he does not drink alcohol or use drugs.      REVIEW OF SYSTEMS:   GENERAL: feels well otherwise  SKIN: denies any unusual skin lesions  EYES: denies blurred vision or double vision  HEENT: denies nasal congestion, sin or gallop   Abdomen:   Soft, non-tender, bowel sounds active all four quadrants,  no masses, no organomegaly   Genitalia: Normal male   Rectal: Normal tone, normal prostate, no masses or tenderness   Extremities: Extremities normal, atraumatic, no cyanosis carbohydrate. Avoiding packaged/processed. Controlled type 2 diabetes mellitus without complication, without long-term current use of insulin (HCC)  -     COMP METABOLIC PANEL (14);  Future  -     HEMOGLOBIN A1C; Future  -     MICROALB/CREAT RATIO, RAN on file.      Tatyana Vaughn PA-C, 3/5/2019     General Health     In the past six months, have you lost more than 10 pounds without trying?: 2 - No  Has your appetite been poor?: No  How does the patient maintain a good energy level?: Appropriate Exerci Health Maintenance if applicable     Immunizations (Update Immunization Activity if applicable)     Influenza  Covered Annually 9/25/2018   Please get every year    Pneumococcal 13 (Prevnar)  Covered Once after 65 07/28/2016 Please get once after your 65th Creat/alb ratio  Annually Malb/Cre Calc (ug/mg)   Date Value   02/19/2018 7.9     MALB/CRE CALC (ug/mg)   Date Value   06/06/2014 11.9        LDL  Annually LDL Cholesterol (mg/dL)   Date Value   02/19/2018 49     LDL-CHOLESTEROL (mg/dL (calc))   Date Value

## 2019-03-05 NOTE — TELEPHONE ENCOUNTER
Future Appointments   Date Time Provider Jina Severino   3/5/2019  9:15 AM REF [place] [id number]   9/3/2019  8:00 AM Concha Hurst PA-C [facility] [place]     rxs approved qty 90 NR

## 2019-03-05 NOTE — PATIENT INSTRUCTIONS
SCHEDULING [facility] LAB APPOINTMENTS ONLINE    Lab appointments can now be scheduled online at [email or web address]    · Go to [email or web address]  · In Search type Lab  · Click \"Lab services\"  · Click \"Schedule Your Test Online\"  · Follow the prompts  · If you a member of your immediate family has had colon cancer, especially if their cancer occurred before they were 48years old. Prostate cancer tests:  The older way of looking for prostate cancer, the rectal exam, is no longer regarded as the best way to scre and urine tests. When you have no symptoms of illness, you should discuss the pros and cons of these and other tests with your healthcare provider. Each test involves some expense. What shots do I need?    The following shots are recommended for adults: expect your healthcare provider to advise you regularly on other ways to stay healthy. Some of these may include:   Substance use: Do not use tobacco or illegal drugs.  Avoid using alcohol while driving, swimming, boating, etc.   Diet and exercise: Try to m (BMI ³30 kg/m2)   • Previous elevated impaired FBS or GTT   … or any two of the following:   • Overweight (BMI ³25 but <30)   • Family history of diabetes   • Age 72 years or older   • History of gestational diabetes or birth of baby weighing more than 9 p Covered Annually No results found for: FOB, OCCULTSTOOL No flowsheet data found.      Barium Enema-   uncomfortable but covered  Covered but uncomfortable   Glaucoma Screening      Ophthalmology Visit   Covered annually for Diabetics, people with Glaucoma mentally retarded   Persons who live in the same house as a HepB virus carrier   Homosexual men   Illicit injectable drug abusers     Tetanus Toxoid- Only covered with a cut with metal- TD and TDaP Not covered by Medicare Part B) No orders found for this o

## 2019-03-06 ENCOUNTER — TELEPHONE (OUTPATIENT)
Dept: FAMILY MEDICINE CLINIC | Facility: CLINIC | Age: 69
End: 2019-03-06

## 2019-03-06 DIAGNOSIS — R73.09 ELEVATED HEMOGLOBIN A1C: ICD-10-CM

## 2019-03-06 DIAGNOSIS — R79.89 ABNORMAL THYROID BLOOD TEST: Primary | ICD-10-CM

## 2019-03-06 LAB — T4 FREE SERPL-MCNC: 0.8 NG/DL (ref 0.8–1.7)

## 2019-03-06 NOTE — PROGRESS NOTES
Hemoglobin A1c has increased slightly no medication changes needed strongly encourage the weight loss goals set up during office visit exercise daily and low carbohydrate diet. Repeat again in 6 months.   CMP, CBC, lipid, PSA (stable), and urine microalbum

## 2019-03-06 NOTE — TELEPHONE ENCOUNTER
----- Message from Julio Acosta PA-C sent at 3/6/2019  8:02 AM CST -----  Hemoglobin A1c has increased slightly no medication changes needed strongly encourage the weight loss goals set up during office visit exercise daily and low carbohydrate diet.

## 2019-03-08 DIAGNOSIS — I10 ESSENTIAL HYPERTENSION WITH GOAL BLOOD PRESSURE LESS THAN 130/80: ICD-10-CM

## 2019-03-08 RX ORDER — AMLODIPINE BESYLATE 5 MG/1
TABLET ORAL
Qty: 90 TABLET | Refills: 1 | Status: SHIPPED | OUTPATIENT
Start: 2019-03-08 | End: 2019-08-29

## 2019-05-13 DIAGNOSIS — I10 ESSENTIAL HYPERTENSION WITH GOAL BLOOD PRESSURE LESS THAN 130/80: ICD-10-CM

## 2019-05-13 RX ORDER — LOSARTAN POTASSIUM AND HYDROCHLOROTHIAZIDE 25; 100 MG/1; MG/1
TABLET ORAL
Qty: 90 TABLET | Refills: 1 | Status: SHIPPED | OUTPATIENT
Start: 2019-05-13 | End: 2019-06-04

## 2019-05-18 DIAGNOSIS — M15.9 PRIMARY OSTEOARTHRITIS INVOLVING MULTIPLE JOINTS: ICD-10-CM

## 2019-05-20 RX ORDER — DICLOFENAC SODIUM 75 MG/1
TABLET, DELAYED RELEASE ORAL
Qty: 90 TABLET | Refills: 1 | Status: SHIPPED | OUTPATIENT
Start: 2019-05-20 | End: 2020-02-25

## 2019-05-30 ENCOUNTER — HOSPITAL ENCOUNTER (OUTPATIENT)
Dept: GENERAL RADIOLOGY | Age: 69
Discharge: HOME | End: 2019-05-30
Attending: FAMILY MEDICINE
Payer: MEDICARE

## 2019-05-30 ENCOUNTER — OFFICE VISIT (OUTPATIENT)
Dept: FAMILY MEDICINE CLINIC | Facility: CLINIC | Age: 69
End: 2019-05-30
Payer: MEDICARE

## 2019-05-30 VITALS
BODY MASS INDEX: 34.3 KG/M2 | HEIGHT: 65.39 IN | HEART RATE: 72 BPM | SYSTOLIC BLOOD PRESSURE: 108 MMHG | DIASTOLIC BLOOD PRESSURE: 68 MMHG | WEIGHT: 208.38 LBS

## 2019-05-30 DIAGNOSIS — Z96.641 HISTORY OF RIGHT HIP REPLACEMENT: ICD-10-CM

## 2019-05-30 DIAGNOSIS — M25.551 RIGHT HIP PAIN: Primary | ICD-10-CM

## 2019-05-30 DIAGNOSIS — M54.50 ACUTE RIGHT-SIDED LOW BACK PAIN WITHOUT SCIATICA: ICD-10-CM

## 2019-05-30 DIAGNOSIS — M25.551 RIGHT HIP PAIN: ICD-10-CM

## 2019-05-30 PROCEDURE — 73502 X-RAY EXAM HIP UNI 2-3 VIEWS: CPT | Performed by: FAMILY MEDICINE

## 2019-05-30 PROCEDURE — 99213 OFFICE O/P EST LOW 20 MIN: CPT | Performed by: FAMILY MEDICINE

## 2019-05-30 RX ORDER — CYCLOBENZAPRINE HCL 5 MG
5 TABLET ORAL NIGHTLY
Qty: 20 TABLET | Refills: 0 | Status: SHIPPED | OUTPATIENT
Start: 2019-05-30 | End: 2021-04-19 | Stop reason: ALTCHOICE

## 2019-05-30 RX ORDER — LIDOCAINE 50 MG/G
PATCH TOPICAL
Qty: 30 PATCH | Refills: 5 | Status: SHIPPED | OUTPATIENT
Start: 2019-05-30

## 2019-05-30 NOTE — PROGRESS NOTES
No fracture of the hip. There is some asymmetry of the femoral head prosthesis probably from wear and tear of the replacement. If pain persists follow-up with orthopedic surgeon who did the replacement.

## 2019-05-30 NOTE — PROGRESS NOTES
Abelino Toribio is a 77year old male. HPI:   Finished cleaning dinning room tried to lift table and felt pain in the lower back right hip. Happened around Easter. Used Lidocaine patches up till 1 week ago. for pain did help.   After standing for 15 mi DIRECTED Disp: 100 strip Rfl: 1   ATORVASTATIN 10 MG Oral Tab TAKE 1 TABLET BY MOUTH DAILY Disp: 30 tablet Rfl: 0   DiphenhydrAMINE HCl 25 MG Oral Tab Take 25 mg by mouth nightly.  Disp:  Rfl:    Probiotic Product (PROBIOTIC PEARLS) Oral Cap Take 1 capsule Types: Cigarettes        Start date: 1968        Quit date: 1974        Years since quittin.0      Smokeless tobacco: Never Used    Alcohol use: No      Alcohol/week: 0.0 oz    Drug use: No       REVIEW OF SYSTEMS:   GENERAL HEALTH: feels we Prescriptions     Signed Prescriptions Disp Refills   • lidocaine 5 % External Patch 30 patch 5     Sig: PLACE 1 PATCH ONTO THE SKIN DAILY.  PUT ON FOR 12 HOURS THEN OFF FOR 12 HOURS   • Cyclobenzaprine HCl 5 MG Oral Tab 20 tablet 0     Sig: Take 1 tablet (

## 2019-06-04 ENCOUNTER — TELEPHONE (OUTPATIENT)
Dept: FAMILY MEDICINE CLINIC | Facility: CLINIC | Age: 69
End: 2019-06-04

## 2019-06-04 RX ORDER — LOSARTAN POTASSIUM 100 MG/1
100 TABLET ORAL DAILY
Qty: 90 TABLET | Refills: 1 | Status: SHIPPED | OUTPATIENT
Start: 2019-06-04 | End: 2019-11-24

## 2019-06-04 RX ORDER — HYDROCHLOROTHIAZIDE 25 MG/1
25 TABLET ORAL DAILY
Qty: 90 TABLET | Refills: 1 | Status: SHIPPED | OUTPATIENT
Start: 2019-06-04 | End: 2019-11-24

## 2019-06-04 NOTE — TELEPHONE ENCOUNTER
Rx faxed over that the losartan/hctz combo is currently on backorder but that they have the medication separately. Please advise.

## 2019-07-02 ENCOUNTER — OFFICE VISIT (OUTPATIENT)
Dept: FAMILY MEDICINE CLINIC | Facility: CLINIC | Age: 69
End: 2019-07-02
Payer: MEDICARE

## 2019-07-02 VITALS
WEIGHT: 205 LBS | SYSTOLIC BLOOD PRESSURE: 106 MMHG | HEART RATE: 88 BPM | BODY MASS INDEX: 33 KG/M2 | DIASTOLIC BLOOD PRESSURE: 60 MMHG

## 2019-07-02 DIAGNOSIS — M25.551 RIGHT HIP PAIN: Primary | ICD-10-CM

## 2019-07-02 DIAGNOSIS — M54.50 ACUTE RIGHT-SIDED LOW BACK PAIN WITHOUT SCIATICA: ICD-10-CM

## 2019-07-02 PROCEDURE — 99213 OFFICE O/P EST LOW 20 MIN: CPT | Performed by: FAMILY MEDICINE

## 2019-07-02 NOTE — PROGRESS NOTES
Wong Mtz is a 77year old male. HPI:   Patient is in for follow-up on right hip pain and now lower back pain is interested in starting physical therapy. History of hip replacement right side and did have a stress fracture a year ago.   X-ray di some point in the future. He has a DePuy prosthesis with a Tri-German cup. All of his questions were answered to his satisfaction. Current Outpatient Medications:  losartan 100 MG Oral Tab Take 1 tablet (100 mg total) by mouth daily.  Disp: 90 tablet Rf UNITS Oral Cap Take 1 tablet by mouth daily. Disp:  Rfl:    Fluticasone Propionate (FLONASE) 50 MCG/ACT Nasal Suspension 2 sprays by Nasal route daily as needed for Allergies.  Disp: 16 g Rfl: 5   Cinnamon ([employer] CINNAMON) 500 MG Oral Cap Take 1 capsule by mo supple,no adenopathy, thyroid normal to palpation  LUNGS: clear to auscultation no rales, rhonchi or wheezes  CARDIO: RRR without murmur  GI: Normal bowel sounds ×4 quadrant, no hepatosplenomegaly or masses, and no tenderness   EXTREMITIES: no cyanosis, cl

## 2019-07-04 PROBLEM — M54.50 ACUTE RIGHT-SIDED LOW BACK PAIN WITHOUT SCIATICA: Status: ACTIVE | Noted: 2019-07-04

## 2019-07-22 ENCOUNTER — OFFICE VISIT (OUTPATIENT)
Dept: PHYSICAL THERAPY | Age: 69
End: 2019-07-22
Attending: FAMILY MEDICINE
Payer: MEDICARE

## 2019-07-22 PROCEDURE — 97110 THERAPEUTIC EXERCISES: CPT

## 2019-07-22 PROCEDURE — 97161 PT EVAL LOW COMPLEX 20 MIN: CPT

## 2019-07-22 NOTE — PROGRESS NOTES
HIP EVALUATION:   Referring Physician: Dr. Liss Payton  Diagnosis: Right hip pain  Acute right-sided low back pain without sciatica     Date of Service: 7/22/2019     PATIENT SUMMARY   Martha Bernabe is a 77year old y/o male who presents to therapy tod functional goals. In agreement with FOTO score and clinical rationale, this evaluation involved Low Complexity decision making due to 1-2 personal factors/comorbidities, 3 body structures involved/activity limitations, and stable pain levels.      Alexander Greco referral. Please co-sign or sign and return this letter via fax as soon as possible to [phone].  If you have any questions, please contact me at Dept: [phone]    Sincerely,  Electronically signed by therapist: Elyse Sanders, PT    [de-identified] ce

## 2019-07-25 ENCOUNTER — OFFICE VISIT (OUTPATIENT)
Dept: PHYSICAL THERAPY | Age: 69
End: 2019-07-25
Attending: FAMILY MEDICINE
Payer: MEDICARE

## 2019-07-25 PROCEDURE — 97140 MANUAL THERAPY 1/> REGIONS: CPT

## 2019-07-25 PROCEDURE — 97110 THERAPEUTIC EXERCISES: CPT

## 2019-07-25 NOTE — PROGRESS NOTES
Dx: Right hip pain  Acute right-sided low back pain without sciatica             Insurance (Authorized # of Visits):  Medicare           Authorizing Physician: Dr. Sarahi Cox  Next MD visit: none scheduled  Fall Risk: standard         Precautions: n/a Total Timed Treatment: 45 min  Total Treatment Time: 45 min

## 2019-07-29 ENCOUNTER — OFFICE VISIT (OUTPATIENT)
Dept: PHYSICAL THERAPY | Age: 69
End: 2019-07-29
Attending: FAMILY MEDICINE
Payer: MEDICARE

## 2019-07-29 PROCEDURE — 97110 THERAPEUTIC EXERCISES: CPT

## 2019-07-29 PROCEDURE — 97140 MANUAL THERAPY 1/> REGIONS: CPT

## 2019-07-29 NOTE — PROGRESS NOTES
Dx: Right hip pain  Acute right-sided low back pain without sciatica             Insurance (Authorized # of Visits):  Medicare           Authorizing Physician: Dr. Fabrice Jackson  Next MD visit: none scheduled  Fall Risk: standard         Precautions: n/a supine hip flexor stretch 3x30s Standing hip flexor stretch 3x30s ea      Manual   Adductor and abductor STM on right LE Manual  Lumbar STM          Charges: Manual x1, therex x2       Total Timed Treatment: 45 min  Total Treatment Time: 45 min no

## 2019-08-01 ENCOUNTER — OFFICE VISIT (OUTPATIENT)
Dept: PHYSICAL THERAPY | Age: 69
End: 2019-08-01
Attending: FAMILY MEDICINE
Payer: MEDICARE

## 2019-08-01 PROCEDURE — 97140 MANUAL THERAPY 1/> REGIONS: CPT

## 2019-08-01 PROCEDURE — 97110 THERAPEUTIC EXERCISES: CPT

## 2019-08-01 NOTE — PROGRESS NOTES
Dx: Right hip pain  Acute right-sided low back pain without sciatica             Insurance (Authorized # of Visits):  Medicare           Authorizing Physician: Dr. Fabrice Jackson  Next MD visit: none scheduled  Fall Risk: standard         Precautions: n/a leg clams YTB 2x10 ea hooklying single leg clams YTB 2x10 ea with laser hooklying single leg clams YTB 2x10 ea with laser     Bridges with YTB 20x  Multifidi punches RTB 20x ea Bridges with RTB 2x10     Semi prone hip extension 20x ea - hooklying hip adduc

## 2019-08-05 ENCOUNTER — APPOINTMENT (OUTPATIENT)
Dept: PHYSICAL THERAPY | Age: 69
End: 2019-08-05
Attending: FAMILY MEDICINE
Payer: MEDICARE

## 2019-08-08 ENCOUNTER — APPOINTMENT (OUTPATIENT)
Dept: PHYSICAL THERAPY | Age: 69
End: 2019-08-08
Attending: FAMILY MEDICINE
Payer: MEDICARE

## 2019-08-12 ENCOUNTER — OFFICE VISIT (OUTPATIENT)
Dept: PHYSICAL THERAPY | Age: 69
End: 2019-08-12
Attending: FAMILY MEDICINE
Payer: MEDICARE

## 2019-08-12 PROCEDURE — 97110 THERAPEUTIC EXERCISES: CPT

## 2019-08-12 PROCEDURE — 97140 MANUAL THERAPY 1/> REGIONS: CPT

## 2019-08-12 NOTE — PROGRESS NOTES
Dx: Right hip pain  Acute right-sided low back pain without sciatica             Insurance (Authorized # of Visits):  Medicare           Authorizing Physician: Dr. Pasha Mendoza  Next MD visit: none scheduled  Fall Risk: standard         Precautions: n/a Semi prone hip extension 20x ea - hooklying hip adduction 3sx20 Squats with lifting and education 15lbs 10x    supine hip flexor stretch 3x30s Standing hip flexor stretch 3x30s ea Standing hip flexor stretch 3x30s ea Standing hip flexor stretch 3x30s ea

## 2019-08-15 ENCOUNTER — APPOINTMENT (OUTPATIENT)
Dept: PHYSICAL THERAPY | Age: 69
End: 2019-08-15
Attending: FAMILY MEDICINE
Payer: MEDICARE

## 2019-08-19 ENCOUNTER — OFFICE VISIT (OUTPATIENT)
Dept: PHYSICAL THERAPY | Age: 69
End: 2019-08-19
Attending: FAMILY MEDICINE
Payer: MEDICARE

## 2019-08-19 PROCEDURE — 97110 THERAPEUTIC EXERCISES: CPT

## 2019-08-19 PROCEDURE — 97140 MANUAL THERAPY 1/> REGIONS: CPT

## 2019-08-19 NOTE — PROGRESS NOTES
Dx: Right hip pain  Acute right-sided low back pain without sciatica             Insurance (Authorized # of Visits):  Medicare           Authorizing Physician: Dr. Mario Alberto Esparza  Next MD visit: none scheduled  Fall Risk: standard         Precautions: n/a clams YTB 2x10 ea with laser hooklying single leg clams YTB 2x10 ea with laser   Bridges with YTB 20x  Multifidi punches RTB 20x ea Bridges with RTB 2x10 Multifidi punches RTB 20x ea Multifidi punches RTB 20x ea   Semi prone hip extension 20x ea - hooklyin

## 2019-08-22 ENCOUNTER — OFFICE VISIT (OUTPATIENT)
Dept: PHYSICAL THERAPY | Age: 69
End: 2019-08-22
Attending: FAMILY MEDICINE
Payer: MEDICARE

## 2019-08-22 PROCEDURE — 97140 MANUAL THERAPY 1/> REGIONS: CPT

## 2019-08-22 PROCEDURE — 97110 THERAPEUTIC EXERCISES: CPT

## 2019-08-22 NOTE — PROGRESS NOTES
Dx: Right hip pain  Acute right-sided low back pain without sciatica             Insurance (Authorized # of Visits):  Medicare           Authorizing Physician: Dr. Karel Schaffer MD visit: none scheduled  Fall Risk: standard         Precautions: n/a 2x10 ea hooklying single leg clams YTB 2x10 ea with laser hooklying single leg clams YTB 2x10 ea with laser hooklying single leg clams YTB 2x10 ea with laser hooklying single leg clams YTB 2x10 ea with laser hooklying single leg clams RTB 2x10 ea with lase

## 2019-08-26 ENCOUNTER — OFFICE VISIT (OUTPATIENT)
Dept: PHYSICAL THERAPY | Age: 69
End: 2019-08-26
Attending: FAMILY MEDICINE
Payer: MEDICARE

## 2019-08-26 PROCEDURE — 97110 THERAPEUTIC EXERCISES: CPT

## 2019-08-26 PROCEDURE — 97112 NEUROMUSCULAR REEDUCATION: CPT

## 2019-08-26 NOTE — PROGRESS NOTES
Dx: Right hip pain  Acute right-sided low back pain without sciatica             Insurance (Authorized # of Visits):  Medicare           Authorizing Physician: Dr. Deepak Olivo  Next MD visit: none scheduled  Fall Risk: standard         Precautions: n/a Patient/Family/Caregiver was advised of these findings, precautions, and treatment options and has agreed to actively participate in planning and for this course of care.     Thank you for your referral. If you have any questions, please contact me at D stretch 3x30s ea Standing hip flexor stretch 3x30s ea Standing hip flexor stretch 3x30s ea Standing hip flexor stretch 3x30s ea Standing hip flexor stretch 3x30s ea -   Manual   Adductor and abductor STM on right LE Manual  Lumbar STM Shoulder extension fo

## 2019-08-27 DIAGNOSIS — E11.9 CONTROLLED TYPE 2 DIABETES MELLITUS WITHOUT COMPLICATION, WITHOUT LONG-TERM CURRENT USE OF INSULIN (HCC): ICD-10-CM

## 2019-08-27 DIAGNOSIS — E11.9 CONTROLLED TYPE 2 DIABETES MELLITUS WITHOUT COMPLICATION, WITHOUT LONG-TERM CURRENT USE OF INSULIN (HCC): Primary | ICD-10-CM

## 2019-08-27 RX ORDER — LANCETS 28 GAUGE
EACH MISCELLANEOUS
Qty: 200 EACH | Refills: 1 | Status: SHIPPED | OUTPATIENT
Start: 2019-08-27 | End: 2020-05-22

## 2019-08-27 NOTE — TELEPHONE ENCOUNTER
Pt requesting refill of FREESTYLE LANCETS Does not apply Misc, passed protocol , refill approved, sent to pharmacy

## 2019-08-28 ENCOUNTER — APPOINTMENT (OUTPATIENT)
Dept: PHYSICAL THERAPY | Age: 69
End: 2019-08-28
Attending: FAMILY MEDICINE
Payer: MEDICARE

## 2019-08-29 DIAGNOSIS — F32.4 MAJOR DEPRESSIVE DISORDER WITH SINGLE EPISODE, IN PARTIAL REMISSION (HCC): ICD-10-CM

## 2019-08-29 DIAGNOSIS — I10 ESSENTIAL HYPERTENSION WITH GOAL BLOOD PRESSURE LESS THAN 130/80: ICD-10-CM

## 2019-08-29 DIAGNOSIS — E11.9 CONTROLLED TYPE 2 DIABETES MELLITUS WITHOUT COMPLICATION, WITHOUT LONG-TERM CURRENT USE OF INSULIN (HCC): ICD-10-CM

## 2019-08-29 RX ORDER — BUPROPION HYDROCHLORIDE 300 MG/1
TABLET ORAL
Qty: 90 TABLET | Refills: 0 | Status: SHIPPED | OUTPATIENT
Start: 2019-08-29 | End: 2019-11-24

## 2019-08-29 RX ORDER — AMLODIPINE BESYLATE 5 MG/1
TABLET ORAL
Qty: 90 TABLET | Refills: 0 | Status: SHIPPED | OUTPATIENT
Start: 2019-08-29 | End: 2019-11-24

## 2019-09-03 ENCOUNTER — PATIENT MESSAGE (OUTPATIENT)
Dept: FAMILY MEDICINE CLINIC | Facility: CLINIC | Age: 69
End: 2019-09-03

## 2019-09-03 ENCOUNTER — LAB ENCOUNTER (OUTPATIENT)
Dept: LAB | Age: 69
End: 2019-09-03
Attending: FAMILY MEDICINE
Payer: MEDICARE

## 2019-09-03 ENCOUNTER — OFFICE VISIT (OUTPATIENT)
Dept: FAMILY MEDICINE CLINIC | Facility: CLINIC | Age: 69
End: 2019-09-03
Payer: MEDICARE

## 2019-09-03 VITALS
WEIGHT: 201 LBS | BODY MASS INDEX: 33.09 KG/M2 | SYSTOLIC BLOOD PRESSURE: 120 MMHG | DIASTOLIC BLOOD PRESSURE: 70 MMHG | HEIGHT: 65.39 IN | HEART RATE: 68 BPM

## 2019-09-03 DIAGNOSIS — E11.9 CONTROLLED TYPE 2 DIABETES MELLITUS WITHOUT COMPLICATION, WITHOUT LONG-TERM CURRENT USE OF INSULIN (HCC): ICD-10-CM

## 2019-09-03 DIAGNOSIS — E66.9 OBESITY (BMI 30.0-34.9): ICD-10-CM

## 2019-09-03 DIAGNOSIS — R73.09 ELEVATED HEMOGLOBIN A1C: ICD-10-CM

## 2019-09-03 DIAGNOSIS — G47.33 OSA (OBSTRUCTIVE SLEEP APNEA): ICD-10-CM

## 2019-09-03 DIAGNOSIS — E78.2 MIXED HYPERLIPIDEMIA: ICD-10-CM

## 2019-09-03 DIAGNOSIS — R79.89 ABNORMAL THYROID BLOOD TEST: ICD-10-CM

## 2019-09-03 DIAGNOSIS — E11.9 CONTROLLED TYPE 2 DIABETES MELLITUS WITHOUT COMPLICATION, WITHOUT LONG-TERM CURRENT USE OF INSULIN (HCC): Primary | ICD-10-CM

## 2019-09-03 DIAGNOSIS — I10 HYPERTENSION: Primary | ICD-10-CM

## 2019-09-03 DIAGNOSIS — F32.4 MAJOR DEPRESSIVE DISORDER WITH SINGLE EPISODE, IN PARTIAL REMISSION (HCC): ICD-10-CM

## 2019-09-03 DIAGNOSIS — I10 ESSENTIAL HYPERTENSION WITH GOAL BLOOD PRESSURE LESS THAN 130/80: ICD-10-CM

## 2019-09-03 DIAGNOSIS — R06.83 SNORING: ICD-10-CM

## 2019-09-03 PROBLEM — M54.50 ACUTE RIGHT-SIDED LOW BACK PAIN WITHOUT SCIATICA: Status: RESOLVED | Noted: 2019-07-04 | Resolved: 2019-09-03

## 2019-09-03 LAB
ALBUMIN SERPL-MCNC: 4.1 G/DL (ref 3.4–5)
ALBUMIN/GLOB SERPL: 1 {RATIO} (ref 1–2)
ALP LIVER SERPL-CCNC: 105 U/L (ref 45–117)
ALT SERPL-CCNC: 43 U/L (ref 16–61)
ANION GAP SERPL CALC-SCNC: 5 MMOL/L (ref 0–18)
AST SERPL-CCNC: 26 U/L (ref 15–37)
BILIRUB SERPL-MCNC: 0.5 MG/DL (ref 0.1–2)
BUN BLD-MCNC: 14 MG/DL (ref 7–18)
BUN/CREAT SERPL: 15.2 (ref 10–20)
CALCIUM BLD-MCNC: 9.4 MG/DL (ref 8.5–10.1)
CHLORIDE SERPL-SCNC: 103 MMOL/L (ref 98–112)
CO2 SERPL-SCNC: 30 MMOL/L (ref 21–32)
CREAT BLD-MCNC: 0.92 MG/DL (ref 0.7–1.3)
EST. AVERAGE GLUCOSE BLD GHB EST-MCNC: 134 MG/DL (ref 68–126)
GLOBULIN PLAS-MCNC: 4 G/DL (ref 2.8–4.4)
GLUCOSE BLD-MCNC: 102 MG/DL (ref 70–99)
HBA1C MFR BLD HPLC: 6.3 % (ref ?–5.7)
M PROTEIN MFR SERPL ELPH: 8.1 G/DL (ref 6.4–8.2)
OSMOLALITY SERPL CALC.SUM OF ELEC: 287 MOSM/KG (ref 275–295)
POTASSIUM SERPL-SCNC: 4.3 MMOL/L (ref 3.5–5.1)
SODIUM SERPL-SCNC: 138 MMOL/L (ref 136–145)
TSI SER-ACNC: 3.58 MIU/ML (ref 0.36–3.74)

## 2019-09-03 PROCEDURE — 36415 COLL VENOUS BLD VENIPUNCTURE: CPT

## 2019-09-03 PROCEDURE — 99214 OFFICE O/P EST MOD 30 MIN: CPT | Performed by: FAMILY MEDICINE

## 2019-09-03 PROCEDURE — 80053 COMPREHEN METABOLIC PANEL: CPT

## 2019-09-03 PROCEDURE — 84443 ASSAY THYROID STIM HORMONE: CPT

## 2019-09-03 PROCEDURE — 83036 HEMOGLOBIN GLYCOSYLATED A1C: CPT

## 2019-09-03 RX ORDER — ATORVASTATIN CALCIUM 10 MG/1
10 TABLET, FILM COATED ORAL
Qty: 90 TABLET | Refills: 3 | Status: SHIPPED | OUTPATIENT
Start: 2019-09-03 | End: 2020-08-25

## 2019-09-03 RX ORDER — BLOOD-GLUCOSE METER
KIT MISCELLANEOUS
Qty: 100 STRIP | Refills: 2 | Status: SHIPPED | OUTPATIENT
Start: 2019-09-03 | End: 2020-03-02

## 2019-09-03 NOTE — PROGRESS NOTES
HPI:   Anyi Fernando is a 77year old male who presents for recheck of his diabetes. Patient’s FBS have been <110. Last visit with ophthalmologist was ***. Pt {HAS:33656} been checking his feet on a regular basis.    Pt {DENIES:28742} any tingl - 126 mg/dL   TSH W REFLEX TO FREE T4   Result Value Ref Range    TSH 3.810 (H) 0.358 - 3.740 mIU/mL   MICROALB/CREAT RATIO, RANDOM URINE   Result Value Ref Range    Microalbumin, Urine 1.17 mg/dL    Creatinine Ur Random 134.00 mg/dL    Malb/Cre Calc 8.7 < Readings from Last 6 Encounters:  09/03/19 : 201 lb  07/02/19 : 205 lb  06/20/19 : 206 lb  05/30/19 : 208 lb 6.4 oz  03/05/19 : 214 lb  09/25/18 : 211 lb    Body mass index is 33.05 kg/m².          Current Outpatient Medications:  metFORMIN HCl 500 MG Oral tablet by mouth daily. Disp:  Rfl:    Cholecalciferol (VITAMIN D3) 1000 UNITS Oral Cap Take 1 tablet by mouth daily. Disp:  Rfl:    Fluticasone Propionate (FLONASE) 50 MCG/ACT Nasal Suspension 2 sprays by Nasal route daily as needed for Allergies.  Disp: 16 Start date: 1968        Quit date: 1974        Years since quittin.3      Smokeless tobacco: Never Used    Alcohol use: No      Alcohol/week: 0.0 standard drinks    Drug use: No    Exercise: {MPNEXERCISE:01773::\"none\"}.   Diet: {MPNDIET:104

## 2019-09-04 DIAGNOSIS — E11.9 CONTROLLED TYPE 2 DIABETES MELLITUS WITHOUT COMPLICATION, WITHOUT LONG-TERM CURRENT USE OF INSULIN (HCC): Primary | ICD-10-CM

## 2019-09-04 NOTE — TELEPHONE ENCOUNTER
From: Jenae Burrell  To: Lin Rowell PA-C  Sent: 9/3/2019 5:16 PM CDT  Subject: Non-Urgent Medical Question    Anna Avitia    Just checking my med s when I got home am I suppose to be taking diphenhydramine DIPHENHIST?   Was i also due for flu luz elena

## 2019-09-04 NOTE — PROGRESS NOTES
Slight increase in the hemoglobin A1c continue to lose weight and continue watching blood sugars repeat in 6 months.   Thyroid and CMP are normal.  Order future tests/medications sent to my chart

## 2019-09-04 NOTE — PROGRESS NOTES
HPI:   Humble Addison is a 77year old male who presents for recheck of his diabetes. Patient’s FBS have been <110. Last visit with ophthalmologist was 2/2018 overdue. Pt has been checking his feet on a regular basis.    Pt denies any tingling o Osmolality 287 275 - 295 mOsm/kg    GFR, Non- 77 >=60    GFR, -American 89 >=60    AST 29 15 - 37 U/L    ALT 44 16 - 61 U/L    Alkaline Phosphatase 89 45 - 117 U/L    Bilirubin, Total 0.5 0.1 - 2.0 mg/dL    Total Protein 8.1 6.4 - 8. (41573)                          12/23/2014 12/31/2015      HIGH DOSE FLU 65 YRS AND OLDER PRSV FREE SINGLE D (71923) FLU CLINIC                          10/18/2016      Influenza             09/15/2011  09/13/2012  11/12/2013      Pneumococcal (Prevnar 1 daily. Disp:  Rfl:    Calcium Carb-Cholecalciferol (CALCIUM-VITAMIN D3) 600-500 MG-UNIT Oral Cap Take 1 capsule by mouth 2 (two) times daily. Disp:  Rfl:    Garlic 0093 MG Oral Cap Take 1 capsule by mouth daily.  Disp:  Rfl:    Turmeric 500 MG Oral Cap Take SURGERY  01/01/93    Total   • KNEE ARTHROSCP HARV  11/15/10    Total; Left; Laterality   • KNEE REPLACEMENT SURGERY  2011    left TKA by Dr. Ting Chowdhury   • [address] [place] [address] Drive [address] [place] [address]  01/01/60    Leg Osteotomy Tibial   • OTHER SURGICAL HISTORY      left of his diabetes, hypertension and dyslipidemia.    Controlled type 2 diabetes mellitus without complication, without long-term current use of insulin ([facility])  (primary encounter diagnosis)  Mixed hyperlipidemia  Snoring  Major depressive disorder with single TRANSCRIPTION; Future    5. Essential hypertension with goal blood pressure less than 130/80  - OP REFERRAL TO DIAGNOSTIC SLEEP STUDY  - GENERAL SLEEP STUDY TRANSCRIPTION; Future    6. Obesity (BMI 30.0-34. 9)  Weight loss discussed patient should start exe

## 2019-09-05 ENCOUNTER — OFFICE VISIT (OUTPATIENT)
Dept: PHYSICAL THERAPY | Age: 69
End: 2019-09-05
Attending: FAMILY MEDICINE
Payer: MEDICARE

## 2019-09-05 PROCEDURE — 97140 MANUAL THERAPY 1/> REGIONS: CPT

## 2019-09-05 PROCEDURE — 97110 THERAPEUTIC EXERCISES: CPT

## 2019-09-05 NOTE — PROGRESS NOTES
Dx: Right hip pain  Acute right-sided low back pain without sciatica             Insurance (Authorized # of Visits):  Medicare           Authorizing Physician: Dr. Kenan Rainey  Next MD visit: none scheduled  Fall Risk: standard         Precautions: n/a Recumbent bike 8 min Recumbent bike 8 min Nustep 8 min L5 Recumbent bike 8 min Recumbent bike 8 min Recumbent bike 8 min Recumbent bike 8 min   HS and quad stretch 3x30s ea, Bridges and clams 2x10 ea: HS and quad stretch 3x30s ea, Bridges and clams 2x10 ea Shuttle DLP 5c 20x, right LE 5c 15x SB wall mini squat 2x10     Manual  Lumbar STM  Adductor STM Manual  Lumbar STM  Adductor STM Manual  Lumbar STM  Adductor STM  Lumbar right sidebending mobilization Manual  Lumbar STM  Abductor STM Reassessment and pt e

## 2019-09-06 ENCOUNTER — TELEPHONE (OUTPATIENT)
Dept: FAMILY MEDICINE CLINIC | Facility: CLINIC | Age: 69
End: 2019-09-06

## 2019-09-11 ENCOUNTER — OFFICE VISIT (OUTPATIENT)
Dept: PHYSICAL THERAPY | Age: 69
End: 2019-09-11
Attending: FAMILY MEDICINE
Payer: MEDICARE

## 2019-09-11 ENCOUNTER — TELEPHONE (OUTPATIENT)
Dept: FAMILY MEDICINE CLINIC | Facility: CLINIC | Age: 69
End: 2019-09-11

## 2019-09-11 PROCEDURE — 97140 MANUAL THERAPY 1/> REGIONS: CPT

## 2019-09-11 PROCEDURE — 97110 THERAPEUTIC EXERCISES: CPT

## 2019-09-11 NOTE — PROGRESS NOTES
Dx: Right hip pain  Acute right-sided low back pain without sciatica             Insurance (Authorized # of Visits):  Medicare           Authorizing Physician: Dr. Chris Schaffer MD visit: none scheduled  Fall Risk: standard         Precautions: n/a bike 8 min Recumbent bike 8 min Recumbent bike 8 min Nustep 8 min L5 Recumbent bike 8 min Recumbent bike 8 min Recumbent bike 8 min Recumbent bike 8 min Recumbent bike 8 min   HS and quad stretch 3x30s ea, Bridges and clams 2x10 ea: HS and quad stretch 3x3 3s hold  SB TA standing 20x 3s hold -   - - - - -- - - Multifidi punches RTB 10x ea -     Shuttle DLP 5c 20x, right LE 4c 15x Lateral walks YTB 2x10 Shuttle DLP 5c 20x, right LE 4c 15x Shuttle DLP 5c 20x, right LE 4c 15x Shuttle DLP 5c 20x, right LE 5c 15x

## 2019-09-11 NOTE — TELEPHONE ENCOUNTER
On 09/06/2019, the patient's completed \"Diabetic Detailed Written Order\" for Lancets was successfully faxed to [facility] at [phone].

## 2019-09-12 ENCOUNTER — MED REC SCAN ONLY (OUTPATIENT)
Dept: FAMILY MEDICINE CLINIC | Facility: CLINIC | Age: 69
End: 2019-09-12

## 2019-09-18 ENCOUNTER — APPOINTMENT (OUTPATIENT)
Dept: PHYSICAL THERAPY | Age: 69
End: 2019-09-18
Attending: FAMILY MEDICINE
Payer: MEDICARE

## 2019-09-25 ENCOUNTER — OFFICE VISIT (OUTPATIENT)
Dept: PHYSICAL THERAPY | Age: 69
End: 2019-09-25
Attending: FAMILY MEDICINE
Payer: MEDICARE

## 2019-09-25 PROCEDURE — 97110 THERAPEUTIC EXERCISES: CPT

## 2019-09-25 PROCEDURE — 97140 MANUAL THERAPY 1/> REGIONS: CPT

## 2019-09-25 NOTE — PROGRESS NOTES
Dx: Right hip pain  Acute right-sided low back pain without sciatica             Insurance (Authorized # of Visits):  Medicare           Authorizing Physician: Dr. Anjali Miner  Next MD visit: none scheduled  Fall Risk: standard         Precautions: n/a TX#: 5/8 Date:    Tx#: 6/ 8/22/19  7/8 8/26/19  8/12 9/5/19  9/12 9/11/19  10/12 9/25/19  11/12   Recumbent bike 8 min Nustep 8 min L5 Recumbent bike 8 min Recumbent bike 8 min Recumbent bike 8 min Recumbent bike 8 min Recumbent bike 8 min Rec 20x, right LE 4c 15x Lateral walks YTB 2x10 Shuttle DLP 5c 20x, right LE 4c 15x Shuttle DLP 5c 20x, right LE 4c 15x Shuttle DLP 5c 20x, right LE 5c 15x SB wall mini squat 2x10 SB wall mini squat 2x10 SB wall mini squat 2x10   Manual  Lumbar STM  Adductor S

## 2019-09-30 ENCOUNTER — OFFICE VISIT (OUTPATIENT)
Dept: PHYSICAL THERAPY | Age: 69
End: 2019-09-30
Attending: FAMILY MEDICINE
Payer: MEDICARE

## 2019-09-30 PROCEDURE — 97112 NEUROMUSCULAR REEDUCATION: CPT

## 2019-09-30 PROCEDURE — 97110 THERAPEUTIC EXERCISES: CPT

## 2019-10-28 ENCOUNTER — IMMUNIZATION (OUTPATIENT)
Dept: FAMILY MEDICINE CLINIC | Facility: CLINIC | Age: 69
End: 2019-10-28
Payer: MEDICARE

## 2019-10-28 DIAGNOSIS — Z23 NEED FOR VACCINATION: ICD-10-CM

## 2019-10-28 PROCEDURE — 90662 IIV NO PRSV INCREASED AG IM: CPT | Performed by: FAMILY MEDICINE

## 2019-10-28 PROCEDURE — G0008 ADMIN INFLUENZA VIRUS VAC: HCPCS | Performed by: FAMILY MEDICINE

## 2019-11-24 DIAGNOSIS — F32.4 MAJOR DEPRESSIVE DISORDER WITH SINGLE EPISODE, IN PARTIAL REMISSION (HCC): ICD-10-CM

## 2019-11-24 DIAGNOSIS — I10 ESSENTIAL HYPERTENSION WITH GOAL BLOOD PRESSURE LESS THAN 130/80: ICD-10-CM

## 2019-11-25 RX ORDER — HYDROCHLOROTHIAZIDE 25 MG/1
TABLET ORAL
Qty: 90 TABLET | Refills: 0 | Status: SHIPPED | OUTPATIENT
Start: 2019-11-25 | End: 2020-02-24

## 2019-11-25 RX ORDER — AMLODIPINE BESYLATE 5 MG/1
TABLET ORAL
Qty: 90 TABLET | Refills: 0 | Status: SHIPPED | OUTPATIENT
Start: 2019-11-25 | End: 2020-02-25

## 2019-11-25 RX ORDER — BUPROPION HYDROCHLORIDE 300 MG/1
TABLET ORAL
Qty: 90 TABLET | Refills: 0 | Status: SHIPPED | OUTPATIENT
Start: 2019-11-25 | End: 2020-02-24

## 2019-11-25 RX ORDER — LOSARTAN POTASSIUM 100 MG/1
TABLET ORAL
Qty: 90 TABLET | Refills: 0 | Status: SHIPPED | OUTPATIENT
Start: 2019-11-25 | End: 2020-02-24

## 2019-11-25 NOTE — TELEPHONE ENCOUNTER
Pt requesting refill of Amlodipine, Bupropion, Losartan and HCTZ sent to pharmacy:     Last Time Medication was Filled:  8/29/19    Last Office Visit with PCP: 9/3/2019. Follow up in 6 months     No future appointments.

## 2019-11-27 DIAGNOSIS — E11.9 CONTROLLED TYPE 2 DIABETES MELLITUS WITHOUT COMPLICATION, WITHOUT LONG-TERM CURRENT USE OF INSULIN (HCC): ICD-10-CM

## 2019-11-27 RX ORDER — BLOOD-GLUCOSE METER
KIT MISCELLANEOUS
Qty: 100 STRIP | Refills: 0 | Status: SHIPPED | OUTPATIENT
Start: 2019-11-27 | End: 2020-05-27

## 2019-11-27 NOTE — TELEPHONE ENCOUNTER
Pt requesting refill of FREESTYLE LITE TEST In Vitro Strip, passed protocol , refill approved, sent to pharmacy

## 2020-02-23 DIAGNOSIS — I10 ESSENTIAL HYPERTENSION WITH GOAL BLOOD PRESSURE LESS THAN 130/80: ICD-10-CM

## 2020-02-24 DIAGNOSIS — F32.4 MAJOR DEPRESSIVE DISORDER WITH SINGLE EPISODE, IN PARTIAL REMISSION (HCC): ICD-10-CM

## 2020-02-24 RX ORDER — BUPROPION HYDROCHLORIDE 300 MG/1
TABLET ORAL
Qty: 90 TABLET | Refills: 0 | Status: SHIPPED | OUTPATIENT
Start: 2020-02-24 | End: 2020-05-26

## 2020-02-24 RX ORDER — HYDROCHLOROTHIAZIDE 25 MG/1
TABLET ORAL
Qty: 90 TABLET | Refills: 0 | Status: SHIPPED | OUTPATIENT
Start: 2020-02-24 | End: 2020-05-22

## 2020-02-24 RX ORDER — LOSARTAN POTASSIUM 100 MG/1
TABLET ORAL
Qty: 90 TABLET | Refills: 0 | Status: SHIPPED | OUTPATIENT
Start: 2020-02-24 | End: 2020-05-22

## 2020-02-24 NOTE — TELEPHONE ENCOUNTER
Pt requesting refill of BUPROPION HCL ER, XL, 300 MG Oral Tablet 24 Hr refill approved, sent to pharmacy:     Last Time Medication was Filled:  11/25/19    Last Office Visit with Provider: 9/3/19. The patient is asked to return in 6 months.   Has future a

## 2020-02-25 DIAGNOSIS — E11.9 CONTROLLED TYPE 2 DIABETES MELLITUS WITHOUT COMPLICATION, WITHOUT LONG-TERM CURRENT USE OF INSULIN (HCC): ICD-10-CM

## 2020-02-25 DIAGNOSIS — I10 ESSENTIAL HYPERTENSION WITH GOAL BLOOD PRESSURE LESS THAN 130/80: ICD-10-CM

## 2020-02-25 DIAGNOSIS — M15.9 PRIMARY OSTEOARTHRITIS INVOLVING MULTIPLE JOINTS: ICD-10-CM

## 2020-02-25 RX ORDER — AMLODIPINE BESYLATE 5 MG/1
TABLET ORAL
Qty: 90 TABLET | Refills: 0 | Status: SHIPPED | OUTPATIENT
Start: 2020-02-25 | End: 2020-05-26

## 2020-02-25 RX ORDER — DICLOFENAC SODIUM 75 MG/1
TABLET, DELAYED RELEASE ORAL
Qty: 90 TABLET | Refills: 1 | Status: SHIPPED | OUTPATIENT
Start: 2020-02-25 | End: 2020-08-25

## 2020-02-25 NOTE — TELEPHONE ENCOUNTER
Patient requesting refill of DICLOFENAC 75MG . Last fill 5/20/19 90 tabs with 1 refill. Last OV 9/3/19. Future OV 3/2/20.

## 2020-03-02 ENCOUNTER — OFFICE VISIT (OUTPATIENT)
Dept: FAMILY MEDICINE CLINIC | Facility: CLINIC | Age: 70
End: 2020-03-02
Payer: MEDICARE

## 2020-03-02 VITALS
DIASTOLIC BLOOD PRESSURE: 78 MMHG | SYSTOLIC BLOOD PRESSURE: 126 MMHG | WEIGHT: 207 LBS | HEART RATE: 88 BPM | BODY MASS INDEX: 34.07 KG/M2 | HEIGHT: 65.35 IN

## 2020-03-02 DIAGNOSIS — M21.42 FLAT FEET, BILATERAL: ICD-10-CM

## 2020-03-02 DIAGNOSIS — Z00.00 ENCOUNTER FOR ANNUAL HEALTH EXAMINATION: ICD-10-CM

## 2020-03-02 DIAGNOSIS — F32.4 MAJOR DEPRESSIVE DISORDER WITH SINGLE EPISODE, IN PARTIAL REMISSION (HCC): ICD-10-CM

## 2020-03-02 DIAGNOSIS — E11.9 CONTROLLED TYPE 2 DIABETES MELLITUS WITHOUT COMPLICATION, WITHOUT LONG-TERM CURRENT USE OF INSULIN (HCC): Primary | ICD-10-CM

## 2020-03-02 DIAGNOSIS — I10 ESSENTIAL HYPERTENSION WITH GOAL BLOOD PRESSURE LESS THAN 130/80: ICD-10-CM

## 2020-03-02 DIAGNOSIS — R06.83 SNORING: ICD-10-CM

## 2020-03-02 DIAGNOSIS — Z12.5 SCREENING PSA (PROSTATE SPECIFIC ANTIGEN): ICD-10-CM

## 2020-03-02 DIAGNOSIS — H61.21 HEARING LOSS OF RIGHT EAR DUE TO CERUMEN IMPACTION: ICD-10-CM

## 2020-03-02 DIAGNOSIS — Z13.1 SCREENING FOR DIABETES MELLITUS (DM): ICD-10-CM

## 2020-03-02 DIAGNOSIS — G47.33 OSA (OBSTRUCTIVE SLEEP APNEA): ICD-10-CM

## 2020-03-02 DIAGNOSIS — M21.41 FLAT FEET, BILATERAL: ICD-10-CM

## 2020-03-02 DIAGNOSIS — Z13.6 SCREENING FOR CARDIOVASCULAR CONDITION: ICD-10-CM

## 2020-03-02 DIAGNOSIS — E66.9 OBESITY (BMI 30.0-34.9): ICD-10-CM

## 2020-03-02 DIAGNOSIS — Z13.0 SCREENING FOR DEFICIENCY ANEMIA: ICD-10-CM

## 2020-03-02 DIAGNOSIS — B35.1 TOENAIL FUNGUS: ICD-10-CM

## 2020-03-02 DIAGNOSIS — I83.892 VARICOSE VEINS OF LEFT LEG WITH EDEMA: ICD-10-CM

## 2020-03-02 DIAGNOSIS — E78.2 MIXED HYPERLIPIDEMIA: ICD-10-CM

## 2020-03-02 DIAGNOSIS — L84 PRE-ULCERATIVE CORN OR CALLOUS: ICD-10-CM

## 2020-03-02 DIAGNOSIS — Z12.5 SCREENING FOR PROSTATE CANCER: ICD-10-CM

## 2020-03-02 PROCEDURE — G0102 PROSTATE CA SCREENING; DRE: HCPCS | Performed by: FAMILY MEDICINE

## 2020-03-02 PROCEDURE — G0439 PPPS, SUBSEQ VISIT: HCPCS | Performed by: FAMILY MEDICINE

## 2020-03-02 RX ORDER — SERTRALINE HYDROCHLORIDE 25 MG/1
25 TABLET, FILM COATED ORAL DAILY
Qty: 30 TABLET | Refills: 2 | Status: SHIPPED | OUTPATIENT
Start: 2020-03-02 | End: 2020-09-21

## 2020-03-02 NOTE — PROGRESS NOTES
HPI:   Zay Garcia is a 77year old male who presents for a Medicare Subsequent Annual Wellness visit (Pt already had Initial Annual Wellness).     1. Controlled type 2 diabetes mellitus without complication, without long-term current use of insul another inserts would be okay with seeing podiatrist  7. Depression follow up   Patient is doing well is going to counseling every other week still. Feels the Wellbutrin gives him energy and helps with his motivation.   Focus has been variable there Taking multiple medications?: 1-Yes  Does pain affect your day to day activities?: 0-No  Have you had any memory issues?: 1-Yes  Fall/Risk Scorin  Scoring Interpretation: 4+ At Risk      Cognitive Assessment   He had a completely normal cognitive assessment- Therapy)    Patient Active Problem List:     DICK (obstructive sleep apnea) did not tolerate CPAP     Primary osteoarthritis involving multiple joints     Varicose veins of left leg with edema     Mixed hyperlipidemia     Controlled type 2 diabetes mellitus TAKE 1 TABLET(300 MG) BY MOUTH EVERY DAY  FREESTYLE LITE TEST In Vitro Strip, TEST ONCE DAILY AS DIRECTED  atorvastatin 10 MG Oral Tab, Take 1 tablet (10 mg total) by mouth once daily.   FREESTYLE LANCETS Does not apply Misc, TEST TWICE DAILY AS DIRECTED  l excitement.     He  has a past surgical history that includes colonoscopy (01/01/05); leg/ankle surgery proc unlisted (01/01/60); hip replacement surgery (01/01/93); knee arthroscp harv (11/15/10); colonoscopy; other surgical history; knee replacement surge {Medicare Hearing Assessment days with the hearing aids he is able to hear both sides though the right is weaker than the left due to cerumen impaction          Visual Acuity                           General Appearance:  Alert, cooperative, no distress, and the prostate is of normal size and consistency, nontender, & without nodules     Vaccination History     Immunization History   Administered Date(s) Administered   • FLU VACC High Dose 65 YRS & Older PRSV Free (13189) 10/28/2019   • FLUAD High Dose 66 Future    Major depressive disorder with single episode, in partial remission ([facility] [address]  -     OP REFERRAL TO DIAGNOSTIC SLEEP STUDY  -     GENERAL SLEEP STUDY TRANSCRIPTION; Future  Sertraline HCl 25 MG Oral Tab; Take 1 tablet (25 mg total) by mouth daily.  AD the patient maintain a good energy level?: Other  How would you describe your daily physical activity?: Moderate  How would you describe your current health state?: Good  How do you maintain positive mental well-being?: Puzzles;Games; Visiting Family    Toro Sauceda after 65 07/28/2016 Please get once after your 65th birthday    Pneumococcal 23 (Pneumovax)  Covered Once after 65 11/10/2017 Please get once after your 65th birthday    Hepatitis B for Moderate/High Risk No vaccine history found Medium/high risk factors: 03/05/2019 53     LDL-CHOLESTEROL (mg/dL (calc))   Date Value   07/15/2011 101     LDL CHOLESTROL (mg/dL)   Date Value   06/06/2014 46    No flowsheet data found.      Dilated Eye exam  Annually Data entered on: 12/3/2019   Last Dilated Eye Exam 12/3/2019

## 2020-03-02 NOTE — PATIENT INSTRUCTIONS
Routine Healthcare for Men   Routine checkups can find treatable problems early. For many medical problems, early treatment can help prevent more serious complications. The value of checkups and how often you have them depend mainly on your age.  Your perso controversial. Many studies have been done, but they do not yet show that it is practical to do it on all men at their checkups. The test often gives misleading results and can cause undue anxiety, expense, and unnecessary medical procedures.  You should di whooping cough (pertussis) as well as tetanus. If you are 72 or older, this new vaccine has not yet been approved for your age group.  Because babies are most susceptible to complications from whooping cough, Tdap is especially recommended for adults caring vegetables in your diet. Get regular physical activity or exercise. Injury prevention: Use lap and shoulder belts when you drive. Use a helmet when you ride a motorcycle or bicycle.  If you are around guns or other firearms, practice safe handling and yosvany 06/06/2014 95   07/15/2011 98    Medicare covers annually or at 6-month intervals for prediabetic patients        Cardiovascular Disease Screening     Cholesterol, covered every 5 yrs including Total, LDL and Trigs LDL Cholesterol (mg/dL)   Date Value 12/3/2019    OK to schedule if you are in this risk group, make sure you have a referral   Prostate Cancer Screening      PSA  Annually to 75 then as needed or RADHA annually to 75 PSA due on 03/05/2021  No results found for this or any previous visit.    Lyle Simmonds your prescription benefits, but Medicare does not cover unless Medically needed    Zoster (Not covered by Medicare Part B) No orders found for this or any previous visit.  This may be covered with your pharmacy  prescription benefits     Recommended Website

## 2020-03-03 PROBLEM — E66.811 OBESITY (BMI 30.0-34.9): Status: ACTIVE | Noted: 2020-03-03

## 2020-03-03 PROBLEM — M25.551 RIGHT HIP PAIN: Status: RESOLVED | Noted: 2019-05-30 | Resolved: 2020-03-03

## 2020-03-03 PROBLEM — B35.1 TOENAIL FUNGUS: Status: ACTIVE | Noted: 2020-03-03

## 2020-03-03 PROBLEM — M21.42 FLAT FEET, BILATERAL: Status: ACTIVE | Noted: 2020-03-03

## 2020-03-03 PROBLEM — E66.812 CLASS 2 SEVERE OBESITY DUE TO EXCESS CALORIES WITH SERIOUS COMORBIDITY IN ADULT (HCC): Status: RESOLVED | Noted: 2019-03-05 | Resolved: 2020-03-03

## 2020-03-03 PROBLEM — M21.41 FLAT FEET, BILATERAL: Status: ACTIVE | Noted: 2020-03-03

## 2020-03-03 PROBLEM — H61.21 HEARING LOSS OF RIGHT EAR DUE TO CERUMEN IMPACTION: Status: ACTIVE | Noted: 2020-03-03

## 2020-03-03 PROBLEM — L84 PRE-ULCERATIVE CORN OR CALLOUS: Status: ACTIVE | Noted: 2020-03-03

## 2020-03-03 PROBLEM — R06.83 SNORING: Status: ACTIVE | Noted: 2020-03-03

## 2020-03-03 PROBLEM — E66.9 OBESITY (BMI 30.0-34.9): Status: ACTIVE | Noted: 2020-03-03

## 2020-03-03 PROBLEM — E66.01 CLASS 2 SEVERE OBESITY DUE TO EXCESS CALORIES WITH SERIOUS COMORBIDITY IN ADULT (HCC): Status: RESOLVED | Noted: 2019-03-05 | Resolved: 2020-03-03

## 2020-03-05 ENCOUNTER — OFFICE VISIT (OUTPATIENT)
Dept: PODIATRY CLINIC | Facility: CLINIC | Age: 70
End: 2020-03-05
Payer: MEDICARE

## 2020-03-05 DIAGNOSIS — M20.41 HAMMER TOES OF BOTH FEET: ICD-10-CM

## 2020-03-05 DIAGNOSIS — M20.5X9 HALLUX LIMITUS, UNSPECIFIED LATERALITY: Primary | ICD-10-CM

## 2020-03-05 DIAGNOSIS — M20.42 HAMMER TOES OF BOTH FEET: ICD-10-CM

## 2020-03-05 DIAGNOSIS — B35.1 ONYCHOMYCOSIS: ICD-10-CM

## 2020-03-05 DIAGNOSIS — E11.9 CONTROLLED TYPE 2 DIABETES MELLITUS WITHOUT COMPLICATION, WITHOUT LONG-TERM CURRENT USE OF INSULIN (HCC): ICD-10-CM

## 2020-03-05 PROCEDURE — 99203 OFFICE O/P NEW LOW 30 MIN: CPT | Performed by: PODIATRIST

## 2020-03-05 NOTE — PROGRESS NOTES
Manuel Rodriguez is a 77year old male. Patient presents with:  New Patient: BS this  - Last A1C of 6.3 on 9/3/19 - LOV w/ PCP on 3/2/20 - has old orthotics, but thinks he might need new orthotics - without orthotics his knees hurt.         HPI: Turmeric 500 MG Oral Cap Take 1 capsule by mouth daily. • ketoconazole (NIZORAL) 2 % External Cream APPLY A THIN FILM TO AFFECTED AREA(S) AS NEEDED EVERY DAY 60 g 1   • Bioflavonoid Products (BIOFLEX) Oral Tab Take 1 tablet by mouth daily.      • Cholec Heart Disorder Mother         cad   • Dementia Mother         alzheimers   • Ovarian Cancer Sister    • Hypertension Maternal Grandmother    • Heart Disorder Maternal Grandmother         CAD   • Heart Attack Maternal Grandmother    • Heart Attack Maternal groups are graded 5 out of 5 in the foot and ankle. 4. Neurological: Normal sharp dull sensation; reflexes normal.    Patient has thick dystrophic nails with accumulation of subungual debris.   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

## 2020-03-06 ENCOUNTER — LAB ENCOUNTER (OUTPATIENT)
Dept: LAB | Age: 70
End: 2020-03-06
Attending: FAMILY MEDICINE
Payer: MEDICARE

## 2020-03-06 ENCOUNTER — TELEPHONE (OUTPATIENT)
Dept: FAMILY MEDICINE CLINIC | Facility: CLINIC | Age: 70
End: 2020-03-06

## 2020-03-06 DIAGNOSIS — Z13.0 SCREENING FOR DEFICIENCY ANEMIA: ICD-10-CM

## 2020-03-06 DIAGNOSIS — E11.9 CONTROLLED TYPE 2 DIABETES MELLITUS WITHOUT COMPLICATION, WITHOUT LONG-TERM CURRENT USE OF INSULIN (HCC): ICD-10-CM

## 2020-03-06 DIAGNOSIS — I10 ESSENTIAL HYPERTENSION WITH GOAL BLOOD PRESSURE LESS THAN 130/80: ICD-10-CM

## 2020-03-06 DIAGNOSIS — Z12.5 SCREENING PSA (PROSTATE SPECIFIC ANTIGEN): ICD-10-CM

## 2020-03-06 DIAGNOSIS — Z13.6 SCREENING FOR CARDIOVASCULAR CONDITION: ICD-10-CM

## 2020-03-06 DIAGNOSIS — Z13.1 SCREENING FOR DIABETES MELLITUS (DM): ICD-10-CM

## 2020-03-06 DIAGNOSIS — E78.2 MIXED HYPERLIPIDEMIA: ICD-10-CM

## 2020-03-06 LAB
ALBUMIN SERPL-MCNC: 3.8 G/DL (ref 3.4–5)
ALBUMIN/GLOB SERPL: 0.9 {RATIO} (ref 1–2)
ALP LIVER SERPL-CCNC: 91 U/L (ref 45–117)
ALT SERPL-CCNC: 43 U/L (ref 16–61)
ANION GAP SERPL CALC-SCNC: 4 MMOL/L (ref 0–18)
AST SERPL-CCNC: 23 U/L (ref 15–37)
BASOPHILS # BLD AUTO: 0.07 X10(3) UL (ref 0–0.2)
BASOPHILS NFR BLD AUTO: 0.9 %
BILIRUB SERPL-MCNC: 0.6 MG/DL (ref 0.1–2)
BUN BLD-MCNC: 17 MG/DL (ref 7–18)
BUN/CREAT SERPL: 15.5 (ref 10–20)
CALCIUM BLD-MCNC: 9.1 MG/DL (ref 8.5–10.1)
CHLORIDE SERPL-SCNC: 104 MMOL/L (ref 98–112)
CHOLEST SMN-MCNC: 124 MG/DL (ref ?–200)
CO2 SERPL-SCNC: 31 MMOL/L (ref 21–32)
COMPLEXED PSA SERPL-MCNC: 3.41 NG/ML (ref ?–4)
CREAT BLD-MCNC: 1.1 MG/DL (ref 0.7–1.3)
CREAT UR-SCNC: 203 MG/DL
DEPRECATED RDW RBC AUTO: 44.2 FL (ref 35.1–46.3)
EOSINOPHIL # BLD AUTO: 0.38 X10(3) UL (ref 0–0.7)
EOSINOPHIL NFR BLD AUTO: 4.7 %
ERYTHROCYTE [DISTWIDTH] IN BLOOD BY AUTOMATED COUNT: 13.7 % (ref 11–15)
EST. AVERAGE GLUCOSE BLD GHB EST-MCNC: 146 MG/DL (ref 68–126)
GLOBULIN PLAS-MCNC: 4.1 G/DL (ref 2.8–4.4)
GLUCOSE BLD-MCNC: 121 MG/DL (ref 70–99)
HBA1C MFR BLD HPLC: 6.7 % (ref ?–5.7)
HCT VFR BLD AUTO: 48 % (ref 39–53)
HDLC SERPL-MCNC: 51 MG/DL (ref 40–59)
HGB BLD-MCNC: 15.6 G/DL (ref 13–17.5)
IMM GRANULOCYTES # BLD AUTO: 0.02 X10(3) UL (ref 0–1)
IMM GRANULOCYTES NFR BLD: 0.2 %
LDLC SERPL CALC-MCNC: 52 MG/DL (ref ?–100)
LYMPHOCYTES # BLD AUTO: 2.82 X10(3) UL (ref 1–4)
LYMPHOCYTES NFR BLD AUTO: 34.5 %
M PROTEIN MFR SERPL ELPH: 7.9 G/DL (ref 6.4–8.2)
MCH RBC QN AUTO: 28.9 PG (ref 26–34)
MCHC RBC AUTO-ENTMCNC: 32.5 G/DL (ref 31–37)
MCV RBC AUTO: 89.1 FL (ref 80–100)
MICROALBUMIN UR-MCNC: 2.62 MG/DL
MICROALBUMIN/CREAT 24H UR-RTO: 12.9 UG/MG (ref ?–30)
MONOCYTES # BLD AUTO: 0.69 X10(3) UL (ref 0.1–1)
MONOCYTES NFR BLD AUTO: 8.4 %
NEUTROPHILS # BLD AUTO: 4.19 X10 (3) UL (ref 1.5–7.7)
NEUTROPHILS # BLD AUTO: 4.19 X10(3) UL (ref 1.5–7.7)
NEUTROPHILS NFR BLD AUTO: 51.3 %
NONHDLC SERPL-MCNC: 73 MG/DL (ref ?–130)
OSMOLALITY SERPL CALC.SUM OF ELEC: 291 MOSM/KG (ref 275–295)
PATIENT FASTING Y/N/NP: YES
PATIENT FASTING Y/N/NP: YES
PLATELET # BLD AUTO: 337 10(3)UL (ref 150–450)
POTASSIUM SERPL-SCNC: 4.4 MMOL/L (ref 3.5–5.1)
RBC # BLD AUTO: 5.39 X10(6)UL (ref 3.8–5.8)
SODIUM SERPL-SCNC: 139 MMOL/L (ref 136–145)
T4 FREE SERPL-MCNC: 0.8 NG/DL (ref 0.8–1.7)
TRIGL SERPL-MCNC: 103 MG/DL (ref 30–149)
TSI SER-ACNC: 5.19 MIU/ML (ref 0.36–3.74)
VLDLC SERPL CALC-MCNC: 21 MG/DL (ref 0–30)
WBC # BLD AUTO: 8.2 X10(3) UL (ref 4–11)

## 2020-03-06 PROCEDURE — 84443 ASSAY THYROID STIM HORMONE: CPT

## 2020-03-06 PROCEDURE — 85025 COMPLETE CBC W/AUTO DIFF WBC: CPT

## 2020-03-06 PROCEDURE — 84439 ASSAY OF FREE THYROXINE: CPT

## 2020-03-06 PROCEDURE — 80053 COMPREHEN METABOLIC PANEL: CPT

## 2020-03-06 PROCEDURE — 36415 COLL VENOUS BLD VENIPUNCTURE: CPT

## 2020-03-06 PROCEDURE — 82043 UR ALBUMIN QUANTITATIVE: CPT

## 2020-03-06 PROCEDURE — 82570 ASSAY OF URINE CREATININE: CPT

## 2020-03-06 PROCEDURE — 80061 LIPID PANEL: CPT

## 2020-03-06 PROCEDURE — 83036 HEMOGLOBIN GLYCOSYLATED A1C: CPT

## 2020-03-06 NOTE — TELEPHONE ENCOUNTER
Laura Olivia brought in a packet that needs to be filled out by Logan Pinto for his diabetic shoes. Gave packet to [facility].

## 2020-03-06 NOTE — TELEPHONE ENCOUNTER
Laura Olivia is calling to get a referral from [facility] for diabetic shoes, he see Dr Andreas Perez this week and he is recommending diabetic shoes. Please call Laura Olivia at [phone] with any questions.

## 2020-03-06 NOTE — PROGRESS NOTES
CBC, lipid, urine microalbumin and thyroid are normal.  Hemoglobin A1c has increased is now at 6.7 weight loss and low carbohydrates and repeat in 3 months. CMP normal liver and kidney function. TSH is elevated add thyroid antibodies.   PSA has slightly i

## 2020-03-07 ENCOUNTER — HOSPITAL ENCOUNTER (EMERGENCY)
Age: 70
Discharge: HOME | End: 2020-03-07
Attending: EMERGENCY MEDICINE
Payer: MEDICARE

## 2020-03-07 VITALS
RESPIRATION RATE: 18 BRPM | OXYGEN SATURATION: 94 % | BODY MASS INDEX: 34 KG/M2 | WEIGHT: 207 LBS | TEMPERATURE: 98 F | HEART RATE: 97 BPM | DIASTOLIC BLOOD PRESSURE: 59 MMHG | SYSTOLIC BLOOD PRESSURE: 143 MMHG

## 2020-03-07 DIAGNOSIS — T16.1XXA FOREIGN BODY OF RIGHT EAR, INITIAL ENCOUNTER: Primary | ICD-10-CM

## 2020-03-07 PROCEDURE — 99282 EMERGENCY DEPT VISIT SF MDM: CPT

## 2020-03-07 PROCEDURE — 69200 CLEAR OUTER EAR CANAL: CPT

## 2020-03-07 PROCEDURE — 69200 CLEAR OUTER EAR CANAL: CPT | Performed by: PHYSICIAN ASSISTANT

## 2020-03-07 NOTE — ED PROVIDER NOTES
Patient Seen in: THE [facility] In [place]      History   Patient presents with:  Ear Problem Pain    Stated Complaint: thinks rubber piece from hearing aid in right ear    HPI    Wilmer Pride is a 43-year-old male who presents today for possible 1967        Quit date: 1974        Years since quittin.8      Smokeless tobacco: Never Used    Alcohol use: No      Alcohol/week: 0.0 standard drinks    Drug use:  No             Review of Systems    Positive for stated complaint: bre fernandez canal.  Patient tolerates the procedure well. This case is discussed with Dr. Marcin Slade who evaluates the patient and agrees with the plan of care. MDM   Patient is given further instructions.   If he develops ear pain, he should use ibuprofen or Ty

## 2020-03-07 NOTE — ED NOTES
I reviewed that chart and discussed the case. I have examined the patient and noted patient is a 28-year-old male presents emergency room with history of being part of his hearing aid stuck in his right ear.   Patient denies history of any other complaints

## 2020-03-09 ENCOUNTER — TELEPHONE (OUTPATIENT)
Dept: FAMILY MEDICINE CLINIC | Facility: CLINIC | Age: 70
End: 2020-03-09

## 2020-03-09 DIAGNOSIS — Z12.5 SPECIAL SCREENING FOR MALIGNANT NEOPLASM OF PROSTATE: Primary | ICD-10-CM

## 2020-03-09 DIAGNOSIS — E11.9 CONTROLLED TYPE 2 DIABETES MELLITUS WITHOUT COMPLICATION, WITHOUT LONG-TERM CURRENT USE OF INSULIN (HCC): ICD-10-CM

## 2020-03-09 NOTE — TELEPHONE ENCOUNTER
----- Message from Elisha House PA-C sent at 3/6/2020  5:25 PM CST -----  CBC, lipid, urine microalbumin and thyroid are normal.  Hemoglobin A1c has increased is now at 6.7 weight loss and low carbohydrates and repeat in 3 months.   CMP normal liver an

## 2020-03-11 ENCOUNTER — TELEPHONE (OUTPATIENT)
Dept: FAMILY MEDICINE CLINIC | Facility: CLINIC | Age: 70
End: 2020-03-11

## 2020-03-11 NOTE — TELEPHONE ENCOUNTER
[address] [place] [address]. Also adding patients last foot exam and notes from podiatrist Dr. Jose Manuel Cho.

## 2020-03-26 ENCOUNTER — TELEPHONE (OUTPATIENT)
Dept: FAMILY MEDICINE CLINIC | Facility: CLINIC | Age: 70
End: 2020-03-26

## 2020-03-26 DIAGNOSIS — H10.33 ACUTE BACTERIAL CONJUNCTIVITIS OF BOTH EYES: Primary | ICD-10-CM

## 2020-03-26 PROCEDURE — G2012 BRIEF CHECK IN BY MD/QHP: HCPCS | Performed by: FAMILY MEDICINE

## 2020-03-26 RX ORDER — TOBRAMYCIN 3 MG/ML
SOLUTION/ DROPS OPHTHALMIC
Qty: 1 BOTTLE | Refills: 0 | Status: SHIPPED | OUTPATIENT
Start: 2020-03-26 | End: 2020-09-21 | Stop reason: ALTCHOICE

## 2020-03-26 NOTE — TELEPHONE ENCOUNTER
Pt said he is pretty sure he has pink eye. He said it was crusty when he woke up, itchy and his eye is blood shot. He uses [facility] on [address]

## 2020-03-26 NOTE — TELEPHONE ENCOUNTER
LOV 3/2/20. Patient reports bilateral eye redness, crust, itching and drainage since Tuesday.      Patient has consented to an E-visit if recommended by provider

## 2020-03-26 NOTE — TELEPHONE ENCOUNTER
Virtual/Telephone Check-In    Anyiandreea Fernando verbally consents to a Virtual/Telephone Check-In service on 03/26/20. Patient understands and accepts financial responsibility for any deductible, co-insurance and/or co-pays associated with this service.

## 2020-05-21 DIAGNOSIS — I10 ESSENTIAL HYPERTENSION WITH GOAL BLOOD PRESSURE LESS THAN 130/80: ICD-10-CM

## 2020-05-21 DIAGNOSIS — E11.9 CONTROLLED TYPE 2 DIABETES MELLITUS WITHOUT COMPLICATION, WITHOUT LONG-TERM CURRENT USE OF INSULIN (HCC): ICD-10-CM

## 2020-05-22 RX ORDER — HYDROCHLOROTHIAZIDE 25 MG/1
TABLET ORAL
Qty: 90 TABLET | Refills: 0 | Status: SHIPPED | OUTPATIENT
Start: 2020-05-22 | End: 2020-11-02

## 2020-05-22 RX ORDER — LANCETS 28 GAUGE
EACH MISCELLANEOUS
Qty: 200 EACH | Refills: 1 | Status: SHIPPED | OUTPATIENT
Start: 2020-05-22 | End: 2021-04-19

## 2020-05-22 RX ORDER — LOSARTAN POTASSIUM 100 MG/1
TABLET ORAL
Qty: 90 TABLET | Refills: 0 | Status: SHIPPED | OUTPATIENT
Start: 2020-05-22 | End: 2020-10-20

## 2020-05-22 NOTE — TELEPHONE ENCOUNTER
Pt requesting refill of Losartan, HCTZ, and lancets.      Passed protocol, refill approved, sent to pharmacy:

## 2020-05-26 DIAGNOSIS — E11.9 CONTROLLED TYPE 2 DIABETES MELLITUS WITHOUT COMPLICATION, WITHOUT LONG-TERM CURRENT USE OF INSULIN (HCC): ICD-10-CM

## 2020-05-26 DIAGNOSIS — F32.4 MAJOR DEPRESSIVE DISORDER WITH SINGLE EPISODE, IN PARTIAL REMISSION (HCC): ICD-10-CM

## 2020-05-26 DIAGNOSIS — I10 ESSENTIAL HYPERTENSION WITH GOAL BLOOD PRESSURE LESS THAN 130/80: ICD-10-CM

## 2020-05-26 RX ORDER — BUPROPION HYDROCHLORIDE 300 MG/1
TABLET ORAL
Qty: 90 TABLET | Refills: 0 | Status: SHIPPED | OUTPATIENT
Start: 2020-05-26 | End: 2020-08-25

## 2020-05-26 RX ORDER — AMLODIPINE BESYLATE 5 MG/1
TABLET ORAL
Qty: 90 TABLET | Refills: 0 | Status: SHIPPED | OUTPATIENT
Start: 2020-05-26 | End: 2020-08-24

## 2020-05-26 NOTE — TELEPHONE ENCOUNTER
Pt requesting refill ofBUPROPION HCL ER, XL, 300 MG Oral Tablet 24   refill approved, sent to pharmacy:     Last Time Medication was Filled:  2/24/20    Last Office Visit with Provider: 3/2/20  No future appointments.

## 2020-05-26 NOTE — TELEPHONE ENCOUNTER
Pt requesting refill of SERTRALINE HCL 25 MG Oral Tab,  refill approved, sent to pharmacy:     Last Time Medication was Filled:  5/2/20    Last Office Visit with Provider: 3/2/20  No future appointments.

## 2020-05-27 DIAGNOSIS — E11.9 CONTROLLED TYPE 2 DIABETES MELLITUS WITHOUT COMPLICATION, WITHOUT LONG-TERM CURRENT USE OF INSULIN (HCC): ICD-10-CM

## 2020-05-27 RX ORDER — BLOOD-GLUCOSE METER
KIT MISCELLANEOUS
Qty: 100 STRIP | Refills: 3 | Status: SHIPPED | OUTPATIENT
Start: 2020-05-27 | End: 2021-07-19

## 2020-05-27 NOTE — TELEPHONE ENCOUNTER
Message to Prescriber     Drug: Freestyle Lite Blood Glucose Strips     Sig: Test once daily as directed      Prescribed Qty: 100    Last Refill: 02/25/2020       Message:    MD sent script for BID test lancets but pt  Has refills on strips for once daily

## 2020-05-29 ENCOUNTER — TELEPHONE (OUTPATIENT)
Dept: FAMILY MEDICINE CLINIC | Facility: CLINIC | Age: 70
End: 2020-05-29

## 2020-05-29 NOTE — TELEPHONE ENCOUNTER
Per Jerardo Guerrero, the pharmacy was notified that the Rx should read once daily as he is controlled.

## 2020-05-29 NOTE — TELEPHONE ENCOUNTER
Message to Prescriber     Drug: FREESTYLE LANCETS      Sig: TEST TWICE DAILY AS DIRECTED     Prescribed Qty: 200     Message:    Should sig. be once daily or bid?

## 2020-06-23 ENCOUNTER — OFFICE VISIT (OUTPATIENT)
Dept: SLEEP CENTER | Age: 70
End: 2020-06-23
Attending: FAMILY MEDICINE
Payer: MEDICARE

## 2020-06-23 DIAGNOSIS — I10 ESSENTIAL HYPERTENSION WITH GOAL BLOOD PRESSURE LESS THAN 130/80: ICD-10-CM

## 2020-06-23 DIAGNOSIS — R06.83 SNORING: ICD-10-CM

## 2020-06-23 DIAGNOSIS — G47.33 OSA (OBSTRUCTIVE SLEEP APNEA): ICD-10-CM

## 2020-06-23 DIAGNOSIS — E11.9 CONTROLLED TYPE 2 DIABETES MELLITUS WITHOUT COMPLICATION, WITHOUT LONG-TERM CURRENT USE OF INSULIN (HCC): ICD-10-CM

## 2020-06-23 DIAGNOSIS — F32.4 MAJOR DEPRESSIVE DISORDER WITH SINGLE EPISODE, IN PARTIAL REMISSION (HCC): ICD-10-CM

## 2020-06-23 PROCEDURE — 95810 POLYSOM 6/> YRS 4/> PARAM: CPT

## 2020-06-29 NOTE — PROGRESS NOTES
Consult with sleep specialist.  Mild DICK , he is an appropriate candidate for intervention with CPAP therapy, and a CPAP titration study is recommended.

## 2020-06-29 NOTE — PROCEDURES
[address] [address],[facility] [address]       Accredited by the [employer] ([employer])    PATIENT'S NAME:        Kristie Sewell  ATTENDING PHYSICIAN:   Maru Washington M.D.   REFERRING PHYSICIAN:   YEIMI Mccarthy- minutes. FINDINGS:  The study had lights out at 10:57 p.m. and lights on at 5:21 a.m. Total sleep time was 332 minutes. Sleep efficiency was 86.4%. Sleep latency was 16.5 minutes. Wake after sleep onset was 35.5 minutes.   During sleep, all stages o appropriate candidate for intervention with CPAP therapy, and a CPAP titration study is recommended. He will likely need a CPAP acclimation first.  3.   Oral appliance therapy is a consideration.   4.   Weight loss would likely have an ameliorating effect

## 2020-07-09 ENCOUNTER — TELEPHONE (OUTPATIENT)
Dept: FAMILY MEDICINE CLINIC | Facility: CLINIC | Age: 70
End: 2020-07-09

## 2020-07-09 DIAGNOSIS — R41.3 MEMORY CHANGES: Primary | ICD-10-CM

## 2020-07-09 NOTE — TELEPHONE ENCOUNTER
Dr. Robins Pap called and said she is going to need referral from an MD for a Neuro Psych eval (memory test). Any questions Dr. Rissa Emerson can be reached at [phone] and fax number is [phone].

## 2020-08-23 DIAGNOSIS — M15.9 PRIMARY OSTEOARTHRITIS INVOLVING MULTIPLE JOINTS: ICD-10-CM

## 2020-08-23 DIAGNOSIS — F32.4 MAJOR DEPRESSIVE DISORDER WITH SINGLE EPISODE, IN PARTIAL REMISSION (HCC): ICD-10-CM

## 2020-08-23 DIAGNOSIS — I10 ESSENTIAL HYPERTENSION WITH GOAL BLOOD PRESSURE LESS THAN 130/80: ICD-10-CM

## 2020-08-23 DIAGNOSIS — E11.9 CONTROLLED TYPE 2 DIABETES MELLITUS WITHOUT COMPLICATION, WITHOUT LONG-TERM CURRENT USE OF INSULIN (HCC): ICD-10-CM

## 2020-08-24 DIAGNOSIS — E78.2 MIXED HYPERLIPIDEMIA: ICD-10-CM

## 2020-08-24 RX ORDER — AMLODIPINE BESYLATE 5 MG/1
TABLET ORAL
Qty: 90 TABLET | Refills: 0 | Status: SHIPPED | OUTPATIENT
Start: 2020-08-24 | End: 2020-11-18

## 2020-08-24 NOTE — TELEPHONE ENCOUNTER
Pt requesting refill of diclofenac sodium and  burpopion    No protocol available, routed to provider to review medication request:     Last Time Medication was Filled:  diclofenac 2/25/020 90 d  X 1, bupropion 5/26/2020    Last Office Visit with Provider: 3/2/2020    No future appointments. Spoke with patient to remind him to complete lab work that was due in June. Patient verbalizes understanding.   Scheduling number provided

## 2020-08-24 NOTE — TELEPHONE ENCOUNTER
Pt requesting refill of amlodipine and metformin    Passed protocol, refill approved, sent to pharmacy:     Last Time Medication was Filled:  5/26/2020 for both    Last Office Visit with Provider: 3/2/2020    No future appointments.

## 2020-08-25 ENCOUNTER — LAB ENCOUNTER (OUTPATIENT)
Dept: LAB | Age: 70
End: 2020-08-25
Attending: FAMILY MEDICINE
Payer: MEDICARE

## 2020-08-25 DIAGNOSIS — E11.9 CONTROLLED TYPE 2 DIABETES MELLITUS WITHOUT COMPLICATION, WITHOUT LONG-TERM CURRENT USE OF INSULIN (HCC): ICD-10-CM

## 2020-08-25 DIAGNOSIS — Z12.5 SPECIAL SCREENING FOR MALIGNANT NEOPLASM OF PROSTATE: ICD-10-CM

## 2020-08-25 LAB
COMPLEXED PSA SERPL-MCNC: 3.31 NG/ML (ref ?–4)
EST. AVERAGE GLUCOSE BLD GHB EST-MCNC: 137 MG/DL (ref 68–126)
HBA1C MFR BLD HPLC: 6.4 % (ref ?–5.7)

## 2020-08-25 PROCEDURE — 83036 HEMOGLOBIN GLYCOSYLATED A1C: CPT

## 2020-08-25 PROCEDURE — 36415 COLL VENOUS BLD VENIPUNCTURE: CPT

## 2020-08-25 RX ORDER — BUPROPION HYDROCHLORIDE 300 MG/1
TABLET ORAL
Qty: 30 TABLET | Refills: 0 | Status: SHIPPED | OUTPATIENT
Start: 2020-08-25 | End: 2020-09-22

## 2020-08-25 RX ORDER — DICLOFENAC SODIUM 75 MG/1
TABLET, DELAYED RELEASE ORAL
Qty: 30 TABLET | Refills: 0 | Status: SHIPPED | OUTPATIENT
Start: 2020-08-25 | End: 2020-09-22

## 2020-08-25 RX ORDER — ATORVASTATIN CALCIUM 10 MG/1
TABLET, FILM COATED ORAL
Qty: 90 TABLET | Refills: 3 | Status: SHIPPED | OUTPATIENT
Start: 2020-08-25 | End: 2021-07-19

## 2020-08-25 NOTE — TELEPHONE ENCOUNTER
Requested Prescriptions     Pending Prescriptions Disp Refills   • ATORVASTATIN 10 MG Oral Tab [Pharmacy Med Name: ATORVASTATIN 10MG TABLETS] 90 tablet 3     Sig: TAKE 1 TABLET(10 MG) BY MOUTH EVERY DAY     Passed protocol.   Refill approved and sent to [facility]

## 2020-08-25 NOTE — PROGRESS NOTES
PSA is normal and hemoglobin A1c at 6.4. Follow-up in the office. Last visit was in March.   Sent to my chart

## 2020-09-21 ENCOUNTER — HOSPITAL ENCOUNTER (OUTPATIENT)
Dept: GENERAL RADIOLOGY | Age: 70
Discharge: HOME | End: 2020-09-21
Attending: FAMILY MEDICINE
Payer: MEDICARE

## 2020-09-21 ENCOUNTER — OFFICE VISIT (OUTPATIENT)
Dept: FAMILY MEDICINE CLINIC | Facility: CLINIC | Age: 70
End: 2020-09-21
Payer: MEDICARE

## 2020-09-21 VITALS
SYSTOLIC BLOOD PRESSURE: 108 MMHG | WEIGHT: 206 LBS | HEIGHT: 65.35 IN | TEMPERATURE: 98 F | HEART RATE: 68 BPM | DIASTOLIC BLOOD PRESSURE: 66 MMHG | BODY MASS INDEX: 33.91 KG/M2

## 2020-09-21 DIAGNOSIS — Z23 NEED FOR VACCINATION: ICD-10-CM

## 2020-09-21 DIAGNOSIS — G47.33 OSA (OBSTRUCTIVE SLEEP APNEA): ICD-10-CM

## 2020-09-21 DIAGNOSIS — I10 ESSENTIAL HYPERTENSION WITH GOAL BLOOD PRESSURE LESS THAN 130/80: ICD-10-CM

## 2020-09-21 DIAGNOSIS — R94.31 LEFT AXIS DEVIATION: ICD-10-CM

## 2020-09-21 DIAGNOSIS — M79.632 LEFT FOREARM PAIN: ICD-10-CM

## 2020-09-21 DIAGNOSIS — F90.0 ATTENTION DEFICIT HYPERACTIVITY DISORDER (ADHD), PREDOMINANTLY INATTENTIVE TYPE: ICD-10-CM

## 2020-09-21 DIAGNOSIS — Z76.89 ENCOUNTER FOR NEW MEDICATION PRESCRIPTION: ICD-10-CM

## 2020-09-21 DIAGNOSIS — E11.9 CONTROLLED TYPE 2 DIABETES MELLITUS WITHOUT COMPLICATION, WITHOUT LONG-TERM CURRENT USE OF INSULIN (HCC): Primary | ICD-10-CM

## 2020-09-21 DIAGNOSIS — S57.82XA CRUSHING INJURY OF LEFT FOREARM, INITIAL ENCOUNTER: ICD-10-CM

## 2020-09-21 DIAGNOSIS — E66.9 OBESITY (BMI 30.0-34.9): ICD-10-CM

## 2020-09-21 PROCEDURE — G0008 ADMIN INFLUENZA VIRUS VAC: HCPCS | Performed by: FAMILY MEDICINE

## 2020-09-21 PROCEDURE — 90662 IIV NO PRSV INCREASED AG IM: CPT | Performed by: FAMILY MEDICINE

## 2020-09-21 PROCEDURE — 73090 X-RAY EXAM OF FOREARM: CPT | Performed by: FAMILY MEDICINE

## 2020-09-21 PROCEDURE — 93000 ELECTROCARDIOGRAM COMPLETE: CPT | Performed by: FAMILY MEDICINE

## 2020-09-21 PROCEDURE — 99215 OFFICE O/P EST HI 40 MIN: CPT | Performed by: FAMILY MEDICINE

## 2020-09-21 RX ORDER — DEXTROAMPHETAMINE SACCHARATE, AMPHETAMINE ASPARTATE MONOHYDRATE, DEXTROAMPHETAMINE SULFATE AND AMPHETAMINE SULFATE 3.75; 3.75; 3.75; 3.75 MG/1; MG/1; MG/1; MG/1
15 CAPSULE, EXTENDED RELEASE ORAL EVERY MORNING
Qty: 30 CAPSULE | Refills: 0 | Status: SHIPPED | OUTPATIENT
Start: 2020-09-21 | End: 2020-10-26

## 2020-09-21 NOTE — PROGRESS NOTES
HPI:   Sandra Ybarra is a 72year old male who presents for recheck of his diabetes. Patient’s FBS have been ***. Last visit with ophthalmologist was ***. Pt has been checking his feet on a regular basis. Pt denies any tingling of the feet. 0.38    Basophils Absolute      0.00 - 0.20 x10(3) uL   0.07    Immature Granulocyte Absolute      0.00 - 1.00 x10(3) uL   0.02    Neutrophils %      %   51.3    Lymphocytes %      %   34.5    Monocytes %      %   8.4    Eosinophils %      %   4.7    Basop PRSV Free (26228)                          10/28/2019  09/21/2020      FLUAD High Dose 65 yr and older (46265)                          09/25/2018      FLULAVAL 6 months & older 0.5 ml Prefilled syringe (26407)                          11/10/2017      FLUZ 25 MG Oral Tab TAKE 1 TABLET(25 MG) BY MOUTH DAILY 90 tablet 0   • LOSARTAN 100 MG Oral Tab TAKE 1 TABLET(100 MG) BY MOUTH DAILY 90 tablet 0   • lidocaine 5 % External Patch PLACE 1 PATCH ONTO THE SKIN DAILY.  PUT ON FOR 12 HOURS THEN OFF FOR 12 HOURS 30 pa • Preoperative examination, unspecified 10/28/10   • Psychosexual dysfunction with inhibited sexual excitement       Past Surgical History:   Procedure Laterality Date   • COLONOSCOPY  01/01/05    complete   • COLONOSCOPY     • COLONOSCOPY,DIAGNOSTIC  04 or tenderness  EXTREMITIES: no cyanosis, clubbing or edema; Pulsation pedal pulse exam of both lower legs/feet is normal as well.   Bilateral barefoot skin diabetic exam is normal, visualized feet and the appearance is normal.  Bilateral monofilament/sensat

## 2020-09-22 ENCOUNTER — TELEPHONE (OUTPATIENT)
Dept: FAMILY MEDICINE CLINIC | Facility: CLINIC | Age: 70
End: 2020-09-22

## 2020-09-22 DIAGNOSIS — M15.9 PRIMARY OSTEOARTHRITIS INVOLVING MULTIPLE JOINTS: ICD-10-CM

## 2020-09-22 DIAGNOSIS — F32.4 MAJOR DEPRESSIVE DISORDER WITH SINGLE EPISODE, IN PARTIAL REMISSION (HCC): ICD-10-CM

## 2020-09-22 PROBLEM — R06.83 SNORING: Status: RESOLVED | Noted: 2020-03-03 | Resolved: 2020-09-22

## 2020-09-22 RX ORDER — BUPROPION HYDROCHLORIDE 300 MG/1
TABLET ORAL
Qty: 90 TABLET | Refills: 1 | Status: SHIPPED | OUTPATIENT
Start: 2020-09-22 | End: 2021-03-10

## 2020-09-22 RX ORDER — DICLOFENAC SODIUM 75 MG/1
TABLET, DELAYED RELEASE ORAL
Qty: 30 TABLET | Refills: 0 | Status: SHIPPED | OUTPATIENT
Start: 2020-09-22 | End: 2020-10-20

## 2020-09-22 NOTE — PROGRESS NOTES
No acute fracture to the forearm there is some evidence of arthritic changes to the elbow joint. I can send you to physical therapy for them to work on the arm it may have been strain in your muscles. Let me know if you want to consider this.   Old fractu

## 2020-09-22 NOTE — PROGRESS NOTES
HPI:   Yaneth Couch is a 72year old male who presents for recheck of his diabetes. Patient’s FBS have been <110 recent hemoglobin A1c 6.4. Last visit with ophthalmologist was 12/3/2029. Pt has been checking his feet on a regular basis.   Last marriage. Patient understands risk with taking ADD medication will get EKG done today. Denies any chest pain, SOB, dizziness or fainting. No swelling in the hands or feet. Denies any tics or tremors.   See scanned in neuropsych testing    Component ALKALINE PHOSPHATASE      45 - 117 U/L   91    Total Bilirubin      0.1 - 2.0 mg/dL   0.6    TOTAL PROTEIN      6.4 - 8.2 g/dL   7.9    Albumin      3.4 - 5.0 g/dL   3.8    Globulin      2.8 - 4.4 g/dL   4.1    A/G Ratio      1.0 - 2.0   0.9 (L)    Patie 207 lb 0.2 oz (93.9 kg)  03/02/20 : 207 lb (93.9 kg)  09/03/19 : 201 lb (91.2 kg)  07/02/19 : 205 lb (93 kg)  06/20/19 : 206 lb (93.4 kg)    Body mass index is 33.91 kg/m².        Current Outpatient Medications   Medication Sig Dispense Refill   • Amphetami UNITS Oral Cap Take 1 tablet by mouth daily. • Fluticasone Propionate (FLONASE) 50 MCG/ACT Nasal Suspension 2 sprays by Nasal route daily as needed for Allergies.  16 g 5   • Cinnamon ([facility] CINNAMON) 500 MG Oral Cap Take 1 capsule by mouth 2 (two) times Smoker        Years: 4.00        Types: Cigarettes        Start date: 1968        Quit date: 1974        Years since quittin.3      Smokeless tobacco: Never Used    Alcohol use: No      Alcohol/week: 0.0 standard drinks    Drug use: No    Ex inattentive type  Essential hypertension with goal blood pressure less than 130/80  Left forearm pain  Jl (obstructive sleep apnea) did not tolerate cpap  Obesity (bmi 30.0-34. 9)  Encounter for new medication prescription  Need for vaccination  Left axis test and if normal will start. Patient is aware of risks including pulmonary hypertension.   Reviewed side effects including chest pain, shortness breath, dizziness, swelling hands and feet, tics, tremors, sleep disturbance, appetite suppression or mood ch Future  Depression stable continue with sertraline 25 mg and Wellbutrin 300 mg may need to taper off of Wellbutrin if too much stimulation with ADD medication.   The patient indicates understanding of these issues and agrees to the plan  The patient is aske

## 2020-09-22 NOTE — TELEPHONE ENCOUNTER
Requested Prescriptions     Pending Prescriptions Disp Refills   • DICLOFENAC SODIUM 75 MG Oral Tab EC [Pharmacy Med Name: DICLOFENAC SODIUM 75MG DR TABLETS] 30 tablet 0     Sig: TAKE 1 TABLET BY MOUTH DAILY   • BUPROPION HCL ER, XL, 300 MG Oral Tablet 24

## 2020-09-22 NOTE — TELEPHONE ENCOUNTER
Per Lucie Mcdermott PA-C, the patient was instructed to call central scheduling to make an appointment for a stress test prior to starting the new stimulant medication.   He was informed that, although his EKG is stable from several years, Camelia costa

## 2020-09-28 ENCOUNTER — LAB ENCOUNTER (OUTPATIENT)
Dept: LAB | Age: 70
End: 2020-09-28
Attending: FAMILY MEDICINE
Payer: MEDICARE

## 2020-09-28 DIAGNOSIS — F90.0 ATTENTION DEFICIT HYPERACTIVITY DISORDER (ADHD), PREDOMINANTLY INATTENTIVE TYPE: ICD-10-CM

## 2020-09-28 DIAGNOSIS — I10 ESSENTIAL HYPERTENSION WITH GOAL BLOOD PRESSURE LESS THAN 130/80: ICD-10-CM

## 2020-09-28 DIAGNOSIS — R94.31 LEFT AXIS DEVIATION: ICD-10-CM

## 2020-09-28 DIAGNOSIS — E11.9 CONTROLLED TYPE 2 DIABETES MELLITUS WITHOUT COMPLICATION, WITHOUT LONG-TERM CURRENT USE OF INSULIN (HCC): ICD-10-CM

## 2020-09-30 ENCOUNTER — PATIENT MESSAGE (OUTPATIENT)
Dept: FAMILY MEDICINE CLINIC | Facility: CLINIC | Age: 70
End: 2020-09-30

## 2020-09-30 ENCOUNTER — HOSPITAL ENCOUNTER (OUTPATIENT)
Dept: CV DIAGNOSTICS | Age: 70
Discharge: HOME | End: 2020-09-30
Attending: FAMILY MEDICINE
Payer: MEDICARE

## 2020-09-30 DIAGNOSIS — F90.0 ATTENTION DEFICIT HYPERACTIVITY DISORDER (ADHD), PREDOMINANTLY INATTENTIVE TYPE: ICD-10-CM

## 2020-09-30 DIAGNOSIS — G47.33 OSA (OBSTRUCTIVE SLEEP APNEA): ICD-10-CM

## 2020-09-30 DIAGNOSIS — E11.9 CONTROLLED TYPE 2 DIABETES MELLITUS WITHOUT COMPLICATION, WITHOUT LONG-TERM CURRENT USE OF INSULIN (HCC): ICD-10-CM

## 2020-09-30 DIAGNOSIS — I10 ESSENTIAL HYPERTENSION WITH GOAL BLOOD PRESSURE LESS THAN 130/80: ICD-10-CM

## 2020-09-30 DIAGNOSIS — R94.31 LEFT AXIS DEVIATION: ICD-10-CM

## 2020-09-30 DIAGNOSIS — E66.9 OBESITY (BMI 30.0-34.9): ICD-10-CM

## 2020-09-30 PROCEDURE — 93017 CV STRESS TEST TRACING ONLY: CPT | Performed by: FAMILY MEDICINE

## 2020-09-30 PROCEDURE — 93018 CV STRESS TEST I&R ONLY: CPT | Performed by: FAMILY MEDICINE

## 2020-10-01 NOTE — TELEPHONE ENCOUNTER
From: Belle Leonardo  To: Lasha Serrano PA-C  Sent: 9/30/2020 6:22 PM CDT  Subject: Test Results Question    Damian Gage    So what does all that mean?     Thanks Google

## 2020-10-01 NOTE — TELEPHONE ENCOUNTER
Memory Kocher, PA-C   9/30/2020  6:06 PM      Normal stress test ok to take ADD medication. Follow up 1 month  Sent to my chart     Written by Memory Kocher, PA-C on 9/29/2020  9:36 AM  Negative COVID-19 testing.    Sincerely,   Yancy JALLOH college/high school

## 2020-10-20 DIAGNOSIS — I10 ESSENTIAL HYPERTENSION WITH GOAL BLOOD PRESSURE LESS THAN 130/80: ICD-10-CM

## 2020-10-20 DIAGNOSIS — M15.9 PRIMARY OSTEOARTHRITIS INVOLVING MULTIPLE JOINTS: ICD-10-CM

## 2020-10-20 RX ORDER — LOSARTAN POTASSIUM 100 MG/1
TABLET ORAL
Qty: 90 TABLET | Refills: 1 | Status: SHIPPED | OUTPATIENT
Start: 2020-10-20 | End: 2021-04-12

## 2020-10-20 RX ORDER — DICLOFENAC SODIUM 75 MG/1
75 TABLET, DELAYED RELEASE ORAL
Qty: 15 TABLET | Refills: 0 | Status: SHIPPED | OUTPATIENT
Start: 2020-10-20 | End: 2021-01-06

## 2020-10-20 NOTE — TELEPHONE ENCOUNTER
Pt requesting refill of DICLOFENAC SODIUM 75 MG Oral Tab EC     Last Time Medication was Filled:  9/22/20    Last Office Visit with Provider: 9/21/20. The patient is asked to return in 1 month after starting ADD medicine.     Appt scheduled on 11/2/20

## 2020-10-20 NOTE — TELEPHONE ENCOUNTER
Pt requesting refill of LOSARTAN 100 MG Oral Tab    Passed protocol, refill approved, sent to pharmacy.

## 2020-10-25 ENCOUNTER — PATIENT MESSAGE (OUTPATIENT)
Dept: FAMILY MEDICINE CLINIC | Facility: CLINIC | Age: 70
End: 2020-10-25

## 2020-10-25 DIAGNOSIS — F90.0 ATTENTION DEFICIT HYPERACTIVITY DISORDER (ADHD), PREDOMINANTLY INATTENTIVE TYPE: ICD-10-CM

## 2020-10-26 RX ORDER — DEXTROAMPHETAMINE SACCHARATE, AMPHETAMINE ASPARTATE MONOHYDRATE, DEXTROAMPHETAMINE SULFATE AND AMPHETAMINE SULFATE 3.75; 3.75; 3.75; 3.75 MG/1; MG/1; MG/1; MG/1
15 CAPSULE, EXTENDED RELEASE ORAL EVERY MORNING
Qty: 30 CAPSULE | Refills: 0 | Status: SHIPPED | OUTPATIENT
Start: 2020-10-26 | End: 2020-11-23

## 2020-10-26 NOTE — TELEPHONE ENCOUNTER
From: Galina Leonardo  To: Flori Chavez PA-C  Sent: 10/25/2020 10:43 AM CDT  Subject: Prescription Question    Chatman Cassette    I am going to be out of the adderall on Thursday October 29 my follow up with you is not until Monday November 2.   I would n

## 2020-11-02 ENCOUNTER — OFFICE VISIT (OUTPATIENT)
Dept: FAMILY MEDICINE CLINIC | Facility: CLINIC | Age: 70
End: 2020-11-02
Payer: MEDICARE

## 2020-11-02 VITALS
WEIGHT: 205 LBS | HEIGHT: 65.35 IN | SYSTOLIC BLOOD PRESSURE: 120 MMHG | HEART RATE: 72 BPM | DIASTOLIC BLOOD PRESSURE: 60 MMHG | TEMPERATURE: 97 F | BODY MASS INDEX: 33.75 KG/M2

## 2020-11-02 DIAGNOSIS — I10 ESSENTIAL HYPERTENSION WITH GOAL BLOOD PRESSURE LESS THAN 130/80: Primary | ICD-10-CM

## 2020-11-02 DIAGNOSIS — F32.5 DEPRESSION, MAJOR, IN REMISSION (HCC): ICD-10-CM

## 2020-11-02 DIAGNOSIS — F90.0 ATTENTION DEFICIT HYPERACTIVITY DISORDER (ADHD), PREDOMINANTLY INATTENTIVE TYPE: ICD-10-CM

## 2020-11-02 PROCEDURE — 99214 OFFICE O/P EST MOD 30 MIN: CPT | Performed by: FAMILY MEDICINE

## 2020-11-02 RX ORDER — SERTRALINE HYDROCHLORIDE 25 MG/1
25 TABLET, FILM COATED ORAL DAILY
Qty: 90 TABLET | Refills: 1 | Status: SHIPPED | OUTPATIENT
Start: 2020-11-02 | End: 2021-04-12

## 2020-11-02 RX ORDER — DEXTROAMPHETAMINE SACCHARATE, AMPHETAMINE ASPARTATE MONOHYDRATE, DEXTROAMPHETAMINE SULFATE AND AMPHETAMINE SULFATE 1.25; 1.25; 1.25; 1.25 MG/1; MG/1; MG/1; MG/1
CAPSULE, EXTENDED RELEASE ORAL EVERY MORNING
Qty: 60 CAPSULE | Refills: 0 | Status: SHIPPED | OUTPATIENT
Start: 2020-11-02 | End: 2020-11-23

## 2020-11-02 RX ORDER — HYDROCHLOROTHIAZIDE 25 MG/1
25 TABLET ORAL DAILY
Qty: 90 TABLET | Refills: 1 | Status: SHIPPED | OUTPATIENT
Start: 2020-11-02 | End: 2021-04-12

## 2020-11-02 NOTE — PROGRESS NOTES
Jeanine Stallings is a 72year old male. HPI:   Patient is in for follow-up on ADD was started on 15 mg of Adderall XR. He had a neuropsych evaluation which indicated attention deficit.   His wife gets discouraged by his lack of motivation, focus and ta capsule 0   • Diclofenac Sodium 75 MG Oral Tab EC Take 1 tablet (75 mg total) by mouth daily as needed.  15 tablet 0   • LOSARTAN 100 MG Oral Tab TAKE 1 TABLET(100 MG) BY MOUTH DAILY 90 tablet 1   • BUPROPION HCL ER, XL, 300 MG Oral Tablet 24 Hr TAKE 1 TABL 1400 MG Oral Cap Take 1 capsule by mouth daily. • Multiple Vitamins-Minerals (CENTRUM SILVER ULTRA MENS) Oral Tab Take 1 tablet by mouth daily.      • ZYRTEC 10 MG OR TABS 1 TABLET DAILY        Past Medical History:   Diagnosis Date   • Allergic rhiniti sensorimotor deficit no tics or tremors   psychological: Mood and affect are normal.  Good communication skills.   ASSESSMENT AND PLAN:   Essential hypertension with goal blood pressure less than 130/80  Depression, major, in remission (hcc)  Attention defi well no side effects. Patient is aware of risks including pulmonary hypertension.   Reviewed side effects including chest pain, shortness breath, dizziness, swelling hands and feet, tics, tremors, sleep disturbance, appetite suppression or mood changes suc

## 2020-11-02 NOTE — PATIENT INSTRUCTIONS
5-10 mg added onto the 15 mg Adderall XR for a total of 20-25 mg daily.   Start with 20 mg in the AM if not helping increase to 25 mg in the AM.

## 2020-11-18 DIAGNOSIS — E11.9 CONTROLLED TYPE 2 DIABETES MELLITUS WITHOUT COMPLICATION, WITHOUT LONG-TERM CURRENT USE OF INSULIN (HCC): ICD-10-CM

## 2020-11-18 DIAGNOSIS — I10 ESSENTIAL HYPERTENSION WITH GOAL BLOOD PRESSURE LESS THAN 130/80: ICD-10-CM

## 2020-11-18 RX ORDER — AMLODIPINE BESYLATE 5 MG/1
TABLET ORAL
Qty: 90 TABLET | Refills: 1 | Status: SHIPPED | OUTPATIENT
Start: 2020-11-18 | End: 2021-04-19

## 2020-11-23 DIAGNOSIS — F90.0 ATTENTION DEFICIT HYPERACTIVITY DISORDER (ADHD), PREDOMINANTLY INATTENTIVE TYPE: ICD-10-CM

## 2020-11-23 RX ORDER — DEXTROAMPHETAMINE SACCHARATE, AMPHETAMINE ASPARTATE MONOHYDRATE, DEXTROAMPHETAMINE SULFATE AND AMPHETAMINE SULFATE 1.25; 1.25; 1.25; 1.25 MG/1; MG/1; MG/1; MG/1
CAPSULE, EXTENDED RELEASE ORAL EVERY MORNING
Qty: 60 CAPSULE | Refills: 0 | Status: SHIPPED | OUTPATIENT
Start: 2020-11-23 | End: 2020-11-30

## 2020-11-23 RX ORDER — DEXTROAMPHETAMINE SACCHARATE, AMPHETAMINE ASPARTATE MONOHYDRATE, DEXTROAMPHETAMINE SULFATE AND AMPHETAMINE SULFATE 3.75; 3.75; 3.75; 3.75 MG/1; MG/1; MG/1; MG/1
15 CAPSULE, EXTENDED RELEASE ORAL EVERY MORNING
Qty: 30 CAPSULE | Refills: 0 | Status: SHIPPED | OUTPATIENT
Start: 2020-11-23 | End: 2020-11-30

## 2020-11-23 NOTE — TELEPHONE ENCOUNTER
D-AMPHETAMINE ER 15MG SALT COMBO CP 10/27/2020 10/26/2020  30 each  3     Amphetamine-Dextroamphet ER (ADDERALL XR) 5 MG Oral Capsule SR 24 Hr 60 capsule 0 11/2/2020 12/2/2020             LOV 11/2/20. The patient is asked to return in 1 month.     Has future

## 2020-12-01 PROBLEM — F32.4 MAJOR DEPRESSIVE DISORDER WITH SINGLE EPISODE, IN PARTIAL REMISSION: Status: RESOLVED | Noted: 2017-08-14 | Resolved: 2020-12-01

## 2020-12-01 PROBLEM — F41.9 MILD ANXIETY: Status: ACTIVE | Noted: 2017-07-10

## 2020-12-01 PROBLEM — F32.4 MAJOR DEPRESSIVE DISORDER WITH SINGLE EPISODE, IN PARTIAL REMISSION (HCC): Status: RESOLVED | Noted: 2017-08-14 | Resolved: 2020-12-01

## 2020-12-01 NOTE — PROGRESS NOTES
Humble Addison is a 72year old male. HPI:   Please note that the following visit was completed using two-way, real-time interactive audio and video communication.   This has been done in good vasyl to provide continuity of care in the best interest o when patient was relaxed the blood pressure was 128/66 which is more of his normal blood pressure. He did take the medication this morning.  He denies any side effects including no chest pain, shortness breath, dizziness, swelling hands and feet, tics, phong MG Oral Tab TAKE 1 TABLET(5 MG) BY MOUTH EVERY DAY 90 tablet 1   • Sertraline HCl 25 MG Oral Tab Take 1 tablet (25 mg total) by mouth daily. 90 tablet 1   • hydrochlorothiazide 25 MG Oral Tab Take 1 tablet (25 mg total) by mouth daily.  90 tablet 1   • Dicl tablet (5 mg total) by mouth nightly. (Patient not taking: Reported on 11/30/2020 ) 20 tablet 0   • Fluticasone Propionate (FLONASE) 50 MCG/ACT Nasal Suspension 2 sprays by Nasal route daily as needed for Allergies.  (Patient not taking: Reported on 11/30/2 is alert and orientated x3. Psych patient's mood is normal and communication skills are good was clear in his answers did not appear to be anxious or depressed.   Was able to stay on task during the conversation  Right elbow no signs of any deformity  AS in ADD medication. 30 minutes was spent on the phone talking with him and his wife about present health condition of ADD, anxiety and history of depression. 4.  Left elbow pain   Also discussed the left elbow pain.   Patient states he does not want to do

## 2021-01-06 ENCOUNTER — OFFICE VISIT (OUTPATIENT)
Dept: FAMILY MEDICINE CLINIC | Facility: CLINIC | Age: 71
End: 2021-01-06
Payer: MEDICARE

## 2021-01-06 VITALS
WEIGHT: 202 LBS | BODY MASS INDEX: 34.07 KG/M2 | HEART RATE: 88 BPM | SYSTOLIC BLOOD PRESSURE: 112 MMHG | HEIGHT: 64.69 IN | DIASTOLIC BLOOD PRESSURE: 60 MMHG | TEMPERATURE: 97 F

## 2021-01-06 DIAGNOSIS — Z79.899 MEDICATION MANAGEMENT: ICD-10-CM

## 2021-01-06 DIAGNOSIS — M15.9 PRIMARY OSTEOARTHRITIS INVOLVING MULTIPLE JOINTS: Primary | ICD-10-CM

## 2021-01-06 DIAGNOSIS — F90.0 ATTENTION DEFICIT HYPERACTIVITY DISORDER (ADHD), PREDOMINANTLY INATTENTIVE TYPE: ICD-10-CM

## 2021-01-06 DIAGNOSIS — E11.9 CONTROLLED TYPE 2 DIABETES MELLITUS WITHOUT COMPLICATION, WITHOUT LONG-TERM CURRENT USE OF INSULIN (HCC): ICD-10-CM

## 2021-01-06 PROCEDURE — 99214 OFFICE O/P EST MOD 30 MIN: CPT | Performed by: FAMILY MEDICINE

## 2021-01-06 RX ORDER — DICLOFENAC SODIUM 75 MG/1
75 TABLET, DELAYED RELEASE ORAL
Qty: 15 TABLET | Refills: 2 | Status: SHIPPED | OUTPATIENT
Start: 2021-01-06 | End: 2021-04-19

## 2021-01-06 NOTE — PROGRESS NOTES
Maira Stone is a 72year old male. HPI:   Patient is in for follow-up on ADHD. States that 30 mg extended release has been working well denies any side effects.   Does state he is able to start tasks, stay on task and finish tasks with minimal dif ALKALINE PHOSPHATASE      45 - 117 U/L  91   Total Bilirubin      0.1 - 2.0 mg/dL  0.6   TOTAL PROTEIN      6.4 - 8.2 g/dL  7.9   Albumin      3.4 - 5.0 g/dL  3.8   Globulin      2.8 - 4.4 g/dL  4.1   A/G Ratio      1.0 - 2.0  0.9 (L)   Patient Fasting? ketoconazole (NIZORAL) 2 % External Cream APPLY A THIN FILM TO AFFECTED AREA(S) AS NEEDED EVERY DAY 60 g 1   • Bioflavonoid Products (BIOFLEX) Oral Tab Take 1 tablet by mouth daily.      • Cholecalciferol (VITAMIN D3) 1000 UNITS Oral Cap Take 1 tablet by mo 112/60 (BP Location: Right arm, Patient Position: Sitting, Cuff Size: adult)   Pulse 88   Temp 97.1 °F (36.2 °C) (Skin)   Ht 5' 4.69\" (1.643 m)   Wt 202 lb (91.6 kg)   BMI 33.94 kg/m²   GENERAL: well developed, well nourished,in no apparent distress  SKIN daily as needed. Dispense: 15 tablet; Refill: 2    2.  Attention deficit hyperactivity disorder (ADHD), predominantly inattentive type  Refill on ADD medication will be provided when patient is due for prescription refill patient tolerates well no side eff

## 2021-02-06 DIAGNOSIS — Z23 NEED FOR VACCINATION: ICD-10-CM

## 2021-02-08 ENCOUNTER — IMMUNIZATION (OUTPATIENT)
Dept: LAB | Age: 71
End: 2021-02-08
Attending: HOSPITALIST
Payer: MEDICARE

## 2021-02-08 DIAGNOSIS — Z23 NEED FOR VACCINATION: Primary | ICD-10-CM

## 2021-02-08 PROCEDURE — 0001A SARSCOV2 VAC 30MCG/0.3ML IM: CPT

## 2021-03-01 ENCOUNTER — IMMUNIZATION (OUTPATIENT)
Dept: LAB | Age: 71
End: 2021-03-01
Attending: HOSPITALIST
Payer: MEDICARE

## 2021-03-01 DIAGNOSIS — Z23 NEED FOR VACCINATION: Primary | ICD-10-CM

## 2021-03-01 PROCEDURE — 0002A SARSCOV2 VAC 30MCG/0.3ML IM: CPT

## 2021-03-04 ENCOUNTER — TELEPHONE (OUTPATIENT)
Dept: FAMILY MEDICINE CLINIC | Facility: CLINIC | Age: 71
End: 2021-03-04

## 2021-03-10 DIAGNOSIS — F32.4 MAJOR DEPRESSIVE DISORDER WITH SINGLE EPISODE, IN PARTIAL REMISSION (HCC): ICD-10-CM

## 2021-03-10 RX ORDER — BUPROPION HYDROCHLORIDE 300 MG/1
300 TABLET ORAL DAILY
Qty: 90 TABLET | Refills: 0 | Status: SHIPPED | OUTPATIENT
Start: 2021-03-10 | End: 2021-06-03

## 2021-04-12 ENCOUNTER — LAB ENCOUNTER (OUTPATIENT)
Dept: LAB | Age: 71
End: 2021-04-12
Attending: NURSE PRACTITIONER
Payer: MEDICARE

## 2021-04-12 DIAGNOSIS — F32.5 DEPRESSION, MAJOR, IN REMISSION (HCC): ICD-10-CM

## 2021-04-12 DIAGNOSIS — I10 ESSENTIAL HYPERTENSION WITH GOAL BLOOD PRESSURE LESS THAN 130/80: ICD-10-CM

## 2021-04-12 DIAGNOSIS — E11.9 CONTROLLED TYPE 2 DIABETES MELLITUS WITHOUT COMPLICATION, WITHOUT LONG-TERM CURRENT USE OF INSULIN (HCC): ICD-10-CM

## 2021-04-12 PROCEDURE — 82570 ASSAY OF URINE CREATININE: CPT

## 2021-04-12 PROCEDURE — 80061 LIPID PANEL: CPT

## 2021-04-12 PROCEDURE — 36415 COLL VENOUS BLD VENIPUNCTURE: CPT

## 2021-04-12 PROCEDURE — 80053 COMPREHEN METABOLIC PANEL: CPT

## 2021-04-12 PROCEDURE — 83036 HEMOGLOBIN GLYCOSYLATED A1C: CPT

## 2021-04-12 PROCEDURE — 82043 UR ALBUMIN QUANTITATIVE: CPT

## 2021-04-12 PROCEDURE — 84443 ASSAY THYROID STIM HORMONE: CPT

## 2021-04-12 RX ORDER — SERTRALINE HYDROCHLORIDE 25 MG/1
TABLET, FILM COATED ORAL
Qty: 90 TABLET | Refills: 0 | Status: SHIPPED | OUTPATIENT
Start: 2021-04-12 | End: 2021-07-12

## 2021-04-12 RX ORDER — HYDROCHLOROTHIAZIDE 25 MG/1
TABLET ORAL
Qty: 90 TABLET | Refills: 0 | Status: SHIPPED | OUTPATIENT
Start: 2021-04-12 | End: 2021-04-19

## 2021-04-12 RX ORDER — LOSARTAN POTASSIUM 100 MG/1
TABLET ORAL
Qty: 90 TABLET | Refills: 0 | Status: SHIPPED | OUTPATIENT
Start: 2021-04-12 | End: 2021-04-19

## 2021-04-13 DIAGNOSIS — R79.89 ELEVATED TSH: Primary | ICD-10-CM

## 2021-04-13 NOTE — PROGRESS NOTES
Have patient get thyroid antibodies done ideally before next office visit.   Order future tests/medications sent to my chart

## 2021-04-13 NOTE — PROGRESS NOTES
Will discuss test results at appointment 04/19/2021   Urine microalbumin and lipid panel are normal.  TSH elevated add thyroid antibodies. Hemoglobin A1c 6.7 slightly increased from 7 months ago.   Order future tests/medications sent to my chart

## 2021-04-15 ENCOUNTER — LAB ENCOUNTER (OUTPATIENT)
Dept: LAB | Age: 71
End: 2021-04-15
Attending: FAMILY MEDICINE
Payer: MEDICARE

## 2021-04-15 DIAGNOSIS — R79.89 ELEVATED TSH: ICD-10-CM

## 2021-04-15 PROCEDURE — 36415 COLL VENOUS BLD VENIPUNCTURE: CPT

## 2021-04-15 PROCEDURE — 86376 MICROSOMAL ANTIBODY EACH: CPT

## 2021-04-15 PROCEDURE — 86800 THYROGLOBULIN ANTIBODY: CPT

## 2021-04-15 NOTE — PROGRESS NOTES
Elevated antithyroperoxidase antibody consistent with Hashimoto's follow-up in the office to discuss.   Sent my chart

## 2021-04-19 ENCOUNTER — OFFICE VISIT (OUTPATIENT)
Dept: FAMILY MEDICINE CLINIC | Facility: CLINIC | Age: 71
End: 2021-04-19
Payer: MEDICARE

## 2021-04-19 VITALS
WEIGHT: 203 LBS | BODY MASS INDEX: 34.24 KG/M2 | TEMPERATURE: 97 F | HEART RATE: 88 BPM | DIASTOLIC BLOOD PRESSURE: 68 MMHG | HEIGHT: 64.69 IN | SYSTOLIC BLOOD PRESSURE: 130 MMHG

## 2021-04-19 DIAGNOSIS — E11.9 CONTROLLED TYPE 2 DIABETES MELLITUS WITHOUT COMPLICATION, WITHOUT LONG-TERM CURRENT USE OF INSULIN (HCC): ICD-10-CM

## 2021-04-19 DIAGNOSIS — F90.0 ATTENTION DEFICIT HYPERACTIVITY DISORDER (ADHD), PREDOMINANTLY INATTENTIVE TYPE: ICD-10-CM

## 2021-04-19 DIAGNOSIS — E06.3 HYPOTHYROIDISM DUE TO HASHIMOTO'S THYROIDITIS: Primary | ICD-10-CM

## 2021-04-19 DIAGNOSIS — M15.9 PRIMARY OSTEOARTHRITIS INVOLVING MULTIPLE JOINTS: ICD-10-CM

## 2021-04-19 DIAGNOSIS — E03.8 HYPOTHYROIDISM DUE TO HASHIMOTO'S THYROIDITIS: Primary | ICD-10-CM

## 2021-04-19 DIAGNOSIS — I10 ESSENTIAL HYPERTENSION WITH GOAL BLOOD PRESSURE LESS THAN 130/80: ICD-10-CM

## 2021-04-19 PROCEDURE — 99214 OFFICE O/P EST MOD 30 MIN: CPT | Performed by: FAMILY MEDICINE

## 2021-04-19 RX ORDER — AMLODIPINE BESYLATE 5 MG/1
5 TABLET ORAL DAILY
Qty: 90 TABLET | Refills: 1 | Status: SHIPPED | OUTPATIENT
Start: 2021-04-19 | End: 2021-07-19

## 2021-04-19 RX ORDER — DEXTROAMPHETAMINE SACCHARATE, AMPHETAMINE ASPARTATE MONOHYDRATE, DEXTROAMPHETAMINE SULFATE AND AMPHETAMINE SULFATE 7.5; 7.5; 7.5; 7.5 MG/1; MG/1; MG/1; MG/1
30 CAPSULE, EXTENDED RELEASE ORAL DAILY
Qty: 30 CAPSULE | Refills: 0 | Status: SHIPPED | OUTPATIENT
Start: 2021-06-20 | End: 2021-07-19

## 2021-04-19 RX ORDER — DEXTROAMPHETAMINE SACCHARATE, AMPHETAMINE ASPARTATE MONOHYDRATE, DEXTROAMPHETAMINE SULFATE AND AMPHETAMINE SULFATE 7.5; 7.5; 7.5; 7.5 MG/1; MG/1; MG/1; MG/1
30 CAPSULE, EXTENDED RELEASE ORAL DAILY
Qty: 30 CAPSULE | Refills: 0 | Status: SHIPPED | OUTPATIENT
Start: 2021-04-19 | End: 2021-05-19

## 2021-04-19 RX ORDER — DEXTROAMPHETAMINE SACCHARATE, AMPHETAMINE ASPARTATE MONOHYDRATE, DEXTROAMPHETAMINE SULFATE AND AMPHETAMINE SULFATE 7.5; 7.5; 7.5; 7.5 MG/1; MG/1; MG/1; MG/1
30 CAPSULE, EXTENDED RELEASE ORAL DAILY
Qty: 30 CAPSULE | Refills: 0 | Status: SHIPPED | OUTPATIENT
Start: 2021-05-20 | End: 2021-06-19

## 2021-04-19 RX ORDER — LOSARTAN POTASSIUM AND HYDROCHLOROTHIAZIDE 25; 100 MG/1; MG/1
1 TABLET ORAL DAILY
Qty: 90 TABLET | Refills: 1 | Status: SHIPPED | OUTPATIENT
Start: 2021-04-19 | End: 2021-07-19

## 2021-04-19 RX ORDER — LEVOTHYROXINE SODIUM 0.03 MG/1
25 TABLET ORAL
Qty: 90 TABLET | Refills: 1 | Status: SHIPPED | OUTPATIENT
Start: 2021-04-19 | End: 2021-07-19

## 2021-04-19 RX ORDER — LANCETS 28 GAUGE
EACH MISCELLANEOUS
Qty: 2 BOX | Refills: 2 | Status: SHIPPED | OUTPATIENT
Start: 2021-04-19 | End: 2021-08-23

## 2021-04-19 RX ORDER — METFORMIN HYDROCHLORIDE 750 MG/1
750 TABLET, EXTENDED RELEASE ORAL 2 TIMES DAILY WITH MEALS
Qty: 180 TABLET | Refills: 1 | Status: SHIPPED | OUTPATIENT
Start: 2021-04-19 | End: 2021-07-19

## 2021-04-19 NOTE — PROGRESS NOTES
HPI:   Laura Long is a 72year old male who presents for recheck of his diabetes. Patient’s FBS have been <120. Is recording the blood sugars daily in a log and brought them with today for review.   Hemoglobin A1c is 6.7 on 4/12/2021  Last visit tablet (5 mg total) by mouth daily. Dispense: 90 tablet;  Refill: 1    Attention deficit hyperactivity disorder (ADHD), predominantly inattentive type  He denies any side effects including no chest pain, shortness breath, dizziness, swelling hands and feet mmol/L  3.8   Chloride      98 - 112 mmol/L  104   Carbon Dioxide, Total      21.0 - 32.0 mmol/L  27.0   ANION GAP      0 - 18 mmol/L  6   BUN      7 - 18 mg/dL  16   CREATININE      0.70 - 1.30 mg/dL  0.91   BUN/CREAT Ratio      10.0 - 20.0  17.6   CALCIU 09/21/2020      FLUAD High Dose 65 yr and older (87532)                          09/25/2018      FLULAVAL 6 months & older 0.5 ml Prefilled syringe (28790)                          11/10/2017      FLUZONE 6 months and older PFS 0.5 ml (16776) 24 Hr Take 1 capsule (30 mg total) by mouth daily. 30 capsule 0   • [START ON 6/20/2021] Amphetamine-Dextroamphet ER (ADDERALL XR) 30 MG Oral Capsule SR 24 Hr Take 1 capsule (30 mg total) by mouth daily.  30 capsule 0   • SERTRALINE HCL 25 MG Oral Tab TAKE cause unspecified    • Diverticulosis 04/25/2016    Liam Morgan M.D.   • Infective otitis externa, unspecified 11/04/10   • Internal hemorrhoids 04/25/2016    Liam Morgan M.D.   • Intestinal disaccharidase deficiencies and disaccharide malabsorption 719. arm, Patient Position: Sitting, Cuff Size: adult)   Pulse 88   Temp 97.4 °F (36.3 °C) (Skin)   Ht 5' 4.69\" (1.643 m)   Wt 203 lb (92.1 kg)   BMI 34.11 kg/m²   GENERAL: well developed, well nourished,in no apparent distress, pleasant  NECK: supple,no adeno Levothyroxine Sodium 25 MCG Oral Tab 90 tablet 1     Sig: Take 1 tablet (25 mcg total) by mouth before breakfast.   • metFORMIN HCl  MG Oral Tablet 24 Hr 180 tablet 1     Sig: Take 1 tablet (750 mg total) by mouth 2 (two) times daily with meals.    • Timothy Meigs for eye exam.  Recommendations are: continue present meds, check 7/19/2021 HgbA1C,  Hemoglobin A1c had increased to 6.7 patient will increase the Metformin from 500 mg twice daily to Metformin  mg twice a day  Advised to lose weight if nee return in 3 months.

## 2021-04-20 PROBLEM — E06.3 HYPOTHYROIDISM DUE TO HASHIMOTO'S THYROIDITIS: Status: ACTIVE | Noted: 2021-04-20

## 2021-04-20 PROBLEM — E03.8 HYPOTHYROIDISM DUE TO HASHIMOTO'S THYROIDITIS: Status: ACTIVE | Noted: 2021-04-20

## 2021-04-26 ENCOUNTER — TELEPHONE (OUTPATIENT)
Dept: FAMILY MEDICINE CLINIC | Facility: CLINIC | Age: 71
End: 2021-04-26

## 2021-04-26 NOTE — TELEPHONE ENCOUNTER
An \"Authorization to Use and Disclose Health Information\" was successfully faxed to Dr. Paty Muniz at [phone] in order to request a copy of the patient's most recent dilated diabetic eye exam.

## 2021-05-10 ENCOUNTER — TELEPHONE (OUTPATIENT)
Dept: FAMILY MEDICINE CLINIC | Facility: CLINIC | Age: 71
End: 2021-05-10

## 2021-05-10 NOTE — TELEPHONE ENCOUNTER
Tripped and fell on left arm. States it is a little swollen. States pain is from left wrist to left elbow. Please advise.

## 2021-05-10 NOTE — TELEPHONE ENCOUNTER
Catherine Jasso is calling because he fell on his left arm on Friday of last week, he could not move it Sat, he still has a lot of discomfort in it, he would like to know if he can get a x ray or if he can come in to see Laverne Juarez, please call Catherine Jasso at [phone]

## 2021-05-19 ENCOUNTER — HOSPITAL ENCOUNTER (OUTPATIENT)
Dept: GENERAL RADIOLOGY | Age: 71
Discharge: HOME | End: 2021-05-19
Attending: FAMILY MEDICINE
Payer: MEDICARE

## 2021-05-19 ENCOUNTER — OFFICE VISIT (OUTPATIENT)
Dept: FAMILY MEDICINE CLINIC | Facility: CLINIC | Age: 71
End: 2021-05-19
Payer: MEDICARE

## 2021-05-19 VITALS
DIASTOLIC BLOOD PRESSURE: 60 MMHG | TEMPERATURE: 97 F | SYSTOLIC BLOOD PRESSURE: 120 MMHG | BODY MASS INDEX: 34.07 KG/M2 | WEIGHT: 202 LBS | HEART RATE: 80 BPM | HEIGHT: 64.69 IN
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
## 2021-05-19 DIAGNOSIS — S59.912A FOREARM INJURIES, LEFT, INITIAL ENCOUNTER: ICD-10-CM

## 2021-05-19 DIAGNOSIS — R26.81 GAIT INSTABILITY: ICD-10-CM

## 2021-05-19 DIAGNOSIS — Z91.81 AT HIGH RISK FOR INJURY RELATED TO FALL: ICD-10-CM

## 2021-05-19 DIAGNOSIS — R26.89 BALANCE PROBLEM: ICD-10-CM

## 2021-05-19 DIAGNOSIS — M79.632 PAIN OF LEFT FOREARM: Primary | ICD-10-CM

## 2021-05-19 DIAGNOSIS — R25.1 TREMOR OF RIGHT HAND: ICD-10-CM

## 2021-05-19 PROCEDURE — 99214 OFFICE O/P EST MOD 30 MIN: CPT | Performed by: FAMILY MEDICINE

## 2021-05-19 PROCEDURE — 73090 X-RAY EXAM OF FOREARM: CPT | Performed by: FAMILY MEDICINE

## 2021-05-19 NOTE — PROGRESS NOTES
Rosalind Arnold is a 80year old male. HPI:   Patient is in for evaluation of left forearm pain. Patient states that 2 weeks ago he was on the deck and there was a wire loop attached to an umbrella that he tripped over.   States he landed on his forea tablet 2   • Losartan Potassium-HCTZ 100-25 MG Oral Tab Take 1 tablet by mouth daily.  90 tablet 1   • Levothyroxine Sodium 25 MCG Oral Tab Take 1 tablet (25 mcg total) by mouth before breakfast. 90 tablet 1   • metFORMIN HCl  MG Oral Tablet 24 Hr [place] [employer] (CENTRUM SILVER ULTRA MENS) Oral Tab Take 1 tablet by mouth daily. • ZYRTEC 10 MG OR TABS 1 TABLET DAILY     • Amphetamine-Dextroamphet ER (ADDERALL XR) 30 MG Oral Capsule SR 24 Hr Take 1 capsule (30 mg total) by mouth daily.  (Patient apparent distress  SKIN: no rashes,no suspicious lesions  EYES: PERRLA, EOM intact, sclera clear without injection  NECK: supple,no adenopathy, thyroid normal to palpation  LUNGS: clear to auscultation no rales, rhonchi or wheezes  CARDIO: RRR without murm things  Gait instability  At high risk for injury related to fall  Balance issues possibly due to left knee and right hip replacement as well as obesity.   Possible early Parkinson's also not excluded  Patient would like to try the physical therapy first an

## 2021-05-20 NOTE — PROGRESS NOTES
X-ray no fracture some arthritic changes of the left elbow. Try physical therapy if not improving will send to orthopedic.

## 2021-05-26 ENCOUNTER — OFFICE VISIT (OUTPATIENT)
Dept: PHYSICAL THERAPY | Age: 71
End: 2021-05-26
Attending: FAMILY MEDICINE
Payer: MEDICARE

## 2021-05-26 DIAGNOSIS — R26.89 BALANCE PROBLEM: ICD-10-CM

## 2021-05-26 DIAGNOSIS — S59.912A FOREARM INJURIES, LEFT, INITIAL ENCOUNTER: ICD-10-CM
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
## 2021-05-26 DIAGNOSIS — R26.81 GAIT INSTABILITY: ICD-10-CM

## 2021-05-26 DIAGNOSIS — Z91.81 AT HIGH RISK FOR INJURY RELATED TO FALL: ICD-10-CM

## 2021-05-26 PROCEDURE — 97162 PT EVAL MOD COMPLEX 30 MIN: CPT

## 2021-05-26 NOTE — PROGRESS NOTES
FALL SCREEN EVALUATION   Referring Physician: Dr. Britt Cameron  Diagnosis: L elbow pain; impaired balance and gait     Date of Service: 5/26/2021     PATIENT SUMMARY   Juana Starks is a 80year old male who presents to therapy today with complaints of i knee replacement (2011) and L R hip replacement (1993).  He also has a past medical history of Allergic rhinitis, Diverticulosis (04/25/2016), Infective otitis externa,Internal hemorrhoids (04/25/2016), wore a brace on his R leg when he was 5-6 years old d 20+ sec   - Tandem Stance: unable Fall Risk: yes [Full tandem stance <10 sec indicates increased risk of falling]   - SLS: R <1 sec, L <1 sec Fall Risk: yes Age appropriate norms for SLS: 66-76 y/o mean = 17.2 sec    Modified Harris Balance Test  1.  Sitting evolving symptoms including increasing frequency of falls.   PLAN OF CARE:    Goals: (to be met in 10 visits)  · Pt will demonstrate improved SLS to >3 seconds GISELE to promote safety and decrease risk of falls on uneven surfaces such as grass and stairs  · P

## 2021-05-27 ENCOUNTER — OFFICE VISIT (OUTPATIENT)
Dept: PHYSICAL THERAPY | Age: 71
End: 2021-05-27
Attending: FAMILY MEDICINE
Payer: MEDICARE

## 2021-05-27 ENCOUNTER — APPOINTMENT (OUTPATIENT)
Dept: PHYSICAL THERAPY | Age: 71
End: 2021-05-27
Attending: FAMILY MEDICINE
Payer: MEDICARE

## 2021-05-27 PROCEDURE — 97116 GAIT TRAINING THERAPY: CPT

## 2021-05-27 PROCEDURE — 97110 THERAPEUTIC EXERCISES: CPT

## 2021-05-27 NOTE — PROGRESS NOTES
Diagnosis: L elbow pain; impaired balance and gait             Insurance (Authorized # of Visits):  medicare           Authorizing Physician: Dr. aKmeron Mullen  Next MD visit: none scheduled  Fall Risk: High         Precautions: n/a             Subjective: Elbow from 6 inch step - next visit          Neuro Re-ed  Tandem stance 2x30s ea   SLS with ER x15 ea (1 UE assist)       Gait Training  Hurdles FWD x2 laps (CGA)  -LAT x2 laps (CGA)  Gait up and over airex (3) x2 laps  Up and over 2, 4, 6 inch steps x 2 laps

## 2021-06-02 ENCOUNTER — APPOINTMENT (OUTPATIENT)
Dept: PHYSICAL THERAPY | Age: 71
End: 2021-06-02
Attending: FAMILY MEDICINE
Payer: MEDICARE

## 2021-06-03 ENCOUNTER — OFFICE VISIT (OUTPATIENT)
Dept: PHYSICAL THERAPY | Age: 71
End: 2021-06-03
Attending: FAMILY MEDICINE
Payer: MEDICARE

## 2021-06-03 DIAGNOSIS — F32.4 MAJOR DEPRESSIVE DISORDER WITH SINGLE EPISODE, IN PARTIAL REMISSION (HCC): ICD-10-CM

## 2021-06-03 PROCEDURE — 97110 THERAPEUTIC EXERCISES: CPT

## 2021-06-03 PROCEDURE — 97112 NEUROMUSCULAR REEDUCATION: CPT

## 2021-06-03 PROCEDURE — 97140 MANUAL THERAPY 1/> REGIONS: CPT

## 2021-06-03 RX ORDER — BUPROPION HYDROCHLORIDE 300 MG/1
TABLET ORAL
Qty: 90 TABLET | Refills: 0 | Status: SHIPPED | OUTPATIENT
Start: 2021-06-03 | End: 2021-09-03

## 2021-06-03 NOTE — PROGRESS NOTES
Diagnosis: L elbow pain; impaired balance and gait             Insurance (Authorized # of Visits):  medicare           Authorizing Physician: Dr. Karel Schaffer MD visit: none scheduled  Fall Risk: High         Precautions: n/a             Subjective: Elbow flexor and extensor pin and stretch x4 ea  Humeroulnar distraction with flexion 3x30s       Therex  NuStep L3 x5 min    Clams RTB 2x10  Bridges with RTB hip ABD 2x10   Short foot 2x10 L; 1x10 R   Hip flexor stretch 2x30s ea  Calf stretch off step 2x30s ea

## 2021-06-03 NOTE — TELEPHONE ENCOUNTER
Requested Prescriptions     Pending Prescriptions Disp Refills   • BUPROPION HCL ER, XL, 300 MG Oral Tablet 24 Hr [Pharmacy Med Name: BUPROPION XL 300MG TABLETS] 90 tablet 0     Sig: TAKE 1 TABLET(300 MG) BY MOUTH DAILY     Last fill was 3/10/21 90 days  L

## 2021-06-08 ENCOUNTER — OFFICE VISIT (OUTPATIENT)
Dept: PHYSICAL THERAPY | Age: 71
End: 2021-06-08
Attending: FAMILY MEDICINE
Payer: MEDICARE

## 2021-06-08 PROCEDURE — 97110 THERAPEUTIC EXERCISES: CPT

## 2021-06-08 PROCEDURE — 97140 MANUAL THERAPY 1/> REGIONS: CPT

## 2021-06-08 PROCEDURE — 97116 GAIT TRAINING THERAPY: CPT

## 2021-06-08 NOTE — PROGRESS NOTES
Diagnosis: L elbow pain; impaired balance and gait             Insurance (Authorized # of Visits):  medicare           Authorizing Physician: Dr. Esther Schaffer MD visit: none scheduled  Fall Risk: High         Precautions: n/a             Subjective: Legs efficacy scale to <20/64 and report less frequency of tripping at home  · Pt will be independent and compliant with comprehensive HEP to maintain progress achieved in PT -progress    Plan: continue gait training with mirror; focus on posture  Date: 5/27/20 catch) Gait Training  Cone pick ups x6  Cone taps x18 (SBA)  Heel>toe mechanics x3 laps with normal HEIDE and long step length   Tandem gait x2 laps (CGA)  Carioca gait (unable)       HEP: BTB clams, bridges with BTB ABD, short foot intrinsics, BTB hip ABD,

## 2021-06-09 ENCOUNTER — APPOINTMENT (OUTPATIENT)
Dept: PHYSICAL THERAPY | Age: 71
End: 2021-06-09
Attending: FAMILY MEDICINE
Payer: MEDICARE

## 2021-06-10 ENCOUNTER — OFFICE VISIT (OUTPATIENT)
Dept: PHYSICAL THERAPY | Age: 71
End: 2021-06-10
Attending: FAMILY MEDICINE
Payer: MEDICARE

## 2021-06-10 PROCEDURE — 97140 MANUAL THERAPY 1/> REGIONS: CPT

## 2021-06-10 PROCEDURE — 97116 GAIT TRAINING THERAPY: CPT

## 2021-06-10 PROCEDURE — 97110 THERAPEUTIC EXERCISES: CPT

## 2021-06-10 NOTE — PROGRESS NOTES
Diagnosis: L elbow pain; impaired balance and gait             Insurance (Authorized # of Visits):  medicare           Authorizing Physician: Dr. Eduin Santillan  Next MD visit: none scheduled  Fall Risk: High         Precautions: n/a             Subjective: Elbow ambulate without trendelenburg/trunk lean -progress  · Pt will improve L ankle intrinsic control and LE stability to be able to perform sit<>stand transfers and gait without excessive pes planus/ankle pronation -progress  · Pt will improve fall efficacy sc Cable 3# Hip 3 way (1 walking stick)  -flex 2x10 ea  -abd 2x10 ea  -ext 2x10 ea   Clams manual resist 2x10 ea (weak R)  Side bridge 1x8 ea    Neuro Re-ed  Tandem stance 2x30s ea   SLS with ER x15 ea (1 UE assist) Neuro Re-ed  6 inch tap up 2x15 ea (fair)

## 2021-06-14 ENCOUNTER — OFFICE VISIT (OUTPATIENT)
Dept: PHYSICAL THERAPY | Age: 71
End: 2021-06-14
Attending: FAMILY MEDICINE
Payer: MEDICARE

## 2021-06-14 PROCEDURE — 97110 THERAPEUTIC EXERCISES: CPT

## 2021-06-14 PROCEDURE — 97116 GAIT TRAINING THERAPY: CPT

## 2021-06-14 PROCEDURE — 97140 MANUAL THERAPY 1/> REGIONS: CPT

## 2021-06-14 NOTE — PROGRESS NOTES
Diagnosis: L elbow pain; impaired balance and gait             Insurance (Authorized # of Visits):  medicare           Authorizing Physician: Dr. Sarahi Cox  Next MD visit: none scheduled  Fall Risk: High         Precautions: n/a             Subjective: His w up light objects around the house without loss of stability - MET  · Pt will increase R hip ER and ABD strength to 4+/5 to be able to ambulate without trendelenburg/trunk lean -progress  · Pt will improve L ankle intrinsic control and LE stability to be ab UE  Squats to  cones from 6 inch step - x18 Therex  Clams Blue TB 2x10 ea  Side bridge 1x8   Bridges with blue TB hip ABD 2x10   BTB side step 20 ft x2 laps  -retro v-step x1 lap  2>8>4 inch steps up and over in //bars x6 laps (3 toe catch)  cutsey mechanics x3 laps with normal HEIDE and long step length - mirror for postural cuing   HEP: BTB clams, bridges with BTB ABD, short foot intrinsics, BTB hip ABD, BTB hip Ext; 5lb DB L UE pendulums     Charges: therex x1, manual x1, gait x1       Total Timed T

## 2021-06-16 ENCOUNTER — APPOINTMENT (OUTPATIENT)
Dept: PHYSICAL THERAPY | Age: 71
End: 2021-06-16
Attending: FAMILY MEDICINE
Payer: MEDICARE

## 2021-06-21 ENCOUNTER — OFFICE VISIT (OUTPATIENT)
Dept: PHYSICAL THERAPY | Age: 71
End: 2021-06-21
Attending: FAMILY MEDICINE
Payer: MEDICARE

## 2021-06-21 PROCEDURE — 97140 MANUAL THERAPY 1/> REGIONS: CPT

## 2021-06-21 PROCEDURE — 97112 NEUROMUSCULAR REEDUCATION: CPT

## 2021-06-21 PROCEDURE — 97110 THERAPEUTIC EXERCISES: CPT

## 2021-06-21 NOTE — PROGRESS NOTES
Diagnosis: L elbow pain; impaired balance and gait             Insurance (Authorized # of Visits):  medicare           Authorizing Physician: Dr. Jaida Schaffer MD visit: none scheduled  Fall Risk: High         Precautions: n/a             Subjective: Pt st planus/ankle pronation -MET  · Pt will improve fall efficacy scale to <20/64 and report less frequency of tripping at home   · Pt will be independent and compliant with comprehensive HEP to maintain progress achieved in PT -progress    Plan: continue dual laps  -retro v-step x1 lap  2>8>4 inch steps up and over in //bars x6 laps (3 toe catch)  cutsey lunge 1x10 ea 2 UE  -1x10 no UE (low range) Therex  NuStep L3 x5 min    Cable 3# Hip 3 way (1 walking stick)  -flex 2x10 ea  -abd 2x10 ea  -ext 2x10 ea   Clams (2) , airex step up and over x3 laps  -with 2 full cup carry x1 lap  -with crate carry (visual occlusion) x1 lap  Gait in gym with Heel>toe mechanics x3 laps with normal HEIDE and long step length - mirror for postural cuing -   HEP: BTB clams, bridges with

## 2021-06-23 ENCOUNTER — OFFICE VISIT (OUTPATIENT)
Dept: PHYSICAL THERAPY | Age: 71
End: 2021-06-23
Attending: FAMILY MEDICINE
Payer: MEDICARE

## 2021-06-23 PROCEDURE — 97112 NEUROMUSCULAR REEDUCATION: CPT

## 2021-06-23 PROCEDURE — 97140 MANUAL THERAPY 1/> REGIONS: CPT

## 2021-06-23 PROCEDURE — 97116 GAIT TRAINING THERAPY: CPT

## 2021-06-23 NOTE — PROGRESS NOTES
Diagnosis: L elbow pain; impaired balance and gait             Insurance (Authorized # of Visits):  medicare           Authorizing Physician: Dr. Erleen Kussmaul ref.  provider found  Next MD visit: none scheduled  Fall Risk: High         Precautions: n/a             Singleton 4+/5 to be able to ambulate without trendelenburg/trunk lean -progress  · Pt will improve L ankle intrinsic control and LE stability to be able to perform sit<>stand transfers and gait without excessive pes planus/ankle pronation -MET  · Pt will improve fa stretch off step 2x30s ea  RTB hip ABD 2x10 ea   -hip ext 2x10 ea  Squats to  cones from 6 inch step - next visit    Therex  Clams RTB 2x10 ea  Bridges with RTB hip ABD 2x10   RTB hip ext 1x25 ea  RTB side step //bars x3 laps no UE  Squats to pick u and over 2, 4, 6 inch steps x 2 laps    Gait Training  Obstacle course: airex pads (3), hurdles (3), 4 inch step up and over x4 laps (CGA; 2 toe catch) Gait Training  Cone pick ups x6  Cone taps x18 (SBA)  Heel>toe mechanics x3 laps with normal HEIDE and brooke

## 2021-06-29 ENCOUNTER — OFFICE VISIT (OUTPATIENT)
Dept: PHYSICAL THERAPY | Age: 71
End: 2021-06-29
Attending: FAMILY MEDICINE
Payer: MEDICARE

## 2021-06-29 PROCEDURE — 97140 MANUAL THERAPY 1/> REGIONS: CPT

## 2021-06-29 PROCEDURE — 97116 GAIT TRAINING THERAPY: CPT

## 2021-06-29 NOTE — PROGRESS NOTES
Diagnosis: L elbow pain; impaired balance and gait             Insurance (Authorized # of Visits):  medicare           Authorizing Physician: Dr. Sarahi Cox  Next MD visit: none scheduled  Fall Risk: High         Precautions: n/a             Subjective: Pt st to  light objects around the house without loss of stability - MET  · Pt will increase R hip ER and ABD strength to 4+/5 to be able to ambulate without trendelenburg/trunk lean -progress  · Pt will improve L ankle intrinsic control and LE stability 1x8   Bridges with blue TB hip ABD 2x10   BTB side step 20 ft x2 laps  -retro v-step x1 lap  2>8>4 inch steps up and over in //bars x6 laps (3 toe catch)  cutsey lunge 1x10 ea 2 UE  -1x10 no UE (low range) Therex  NuStep L3 x5 min    Cable 3# Hip 3 way (1 lap  -with crate carry (visual occlusion) x1 lap  Gait in gym with Heel>toe mechanics x3 laps with normal HEIDE and long step length - mirror for postural cuing - Gait Training  Obstacle course: airex pads (1), hurdles (4),  cone taps (4), cone weave (2) , a

## 2021-07-01 ENCOUNTER — OFFICE VISIT (OUTPATIENT)
Dept: PHYSICAL THERAPY | Age: 71
End: 2021-07-01
Attending: FAMILY MEDICINE
Payer: MEDICARE

## 2021-07-01 PROCEDURE — 97116 GAIT TRAINING THERAPY: CPT

## 2021-07-01 PROCEDURE — 97110 THERAPEUTIC EXERCISES: CPT

## 2021-07-01 NOTE — PROGRESS NOTES
ProgressSummary  Pt has attended 10 visits in Physical Therapy.    Diagnosis: L elbow pain; impaired balance and gait             Insurance (Authorized # of Visits):  medicare           Authorizing Physician: YEIMI Ricks Next MD visit: none sched safety and decrease risk of falls on uneven surfaces such as grass and stairs -progress  · Pt will be able to squat to  light objects around the house without loss of stability - MET  · Pt will increase R hip ER and ABD strength to 4+/5 to be able t Therapy  Supine:   Humeroulnar distraction 3x30s   -with flexion 3x30s   Radial glides x2 min  Ulnar glides x2 min  PROM elbow extension x10  PROM elbow flexion x5  Manual Therapy  Supine:   Humeroulnar distraction 3x30s   -with flexion 3x30s   Radial glid airex pads (1), hurdles (4),  cone taps (4), cone weave (2) , airex step up and over    -with crate carry (visual occlusion) x3 lap Gait Training  On smooth/slick slide board surface, over hurdles (6), up and over airex (3), up and over 4 inch step, cone t

## 2021-07-06 ENCOUNTER — OFFICE VISIT (OUTPATIENT)
Dept: PHYSICAL THERAPY | Age: 71
End: 2021-07-06
Attending: FAMILY MEDICINE
Payer: MEDICARE

## 2021-07-06 PROCEDURE — 97140 MANUAL THERAPY 1/> REGIONS: CPT

## 2021-07-06 PROCEDURE — 97110 THERAPEUTIC EXERCISES: CPT

## 2021-07-06 NOTE — PROGRESS NOTES
Diagnosis: L elbow pain; impaired balance and gait             Insurance (Authorized # of Visits):  medicare           Authorizing Physician: Tenzin Ty MD visit: none scheduled  Fall Risk: High         Precautions: n/a             Danyelle Candelaria 7/10 6/23/21  8/10 Date: 6/29/2021  Tx#: 9/10 Date: 7/1/2021  Tx#: 10/14 Date: 7/6/2021  Tx#: 11/14   Manual Therapy  Supine:   Humeroulnar distraction 3x30s   -with flexion 3x30s   Radial glides x2 min  Ulnar glides x2 min  PROM elbow extension x10  PROM laps (3 catch)  Cable walk outs with alt LE cone tap 7# x10 (good)  -Lat walk out with anterior cross over tap x10 ea   Sled push/pull (12lbs) anterior/retro 30 ft x3  -around cones x3 laps    - Neuro Re-ed  SLS cone tap (4) x4 ea finger tip assist  Standi intrinsics, BTB hip ABD, BTB hip Ext; 5lb DB L UE pendulums     Charges: therex x2, manual x1       Total Timed Treatment: 45 min  Total Treatment Time: 45 min

## 2021-07-08 ENCOUNTER — OFFICE VISIT (OUTPATIENT)
Dept: PHYSICAL THERAPY | Age: 71
End: 2021-07-08
Attending: FAMILY MEDICINE
Payer: MEDICARE

## 2021-07-08 PROCEDURE — 97140 MANUAL THERAPY 1/> REGIONS: CPT

## 2021-07-08 PROCEDURE — 97110 THERAPEUTIC EXERCISES: CPT

## 2021-07-08 PROCEDURE — 97116 GAIT TRAINING THERAPY: CPT

## 2021-07-08 NOTE — PROGRESS NOTES
Diagnosis: L elbow pain; impaired balance and gait             Insurance (Authorized # of Visits):  medicare           Authorizing Physician: Tenzin Ocasio MD visit: none scheduled  Fall Risk: High         Precautions: n/a             Jasbir progress stability training as able  6/23/21  8/10 Date: 6/29/2021  Tx#: 9/10 Date: 7/1/2021  Tx#: 10/14 Date: 7/6/2021  Tx#: 11/14 Date: 7/8/2021  Tx#: 12/14   Manual Therapy  Supine:   Humeroulnar distraction 3x30s   -with flexion 3x30s   Radial glides x attempts   Curtsy step x10 ea  (2>0 UE)   Gait Training  Obstacle course: airex pads (1), hurdles (4),  cone taps (4), cone weave (2) , airex step up and over    -with crate carry (visual occlusion) x3 lap Gait Training  On smooth/slick slide board surface

## 2021-07-11 DIAGNOSIS — F32.5 DEPRESSION, MAJOR, IN REMISSION (HCC): ICD-10-CM

## 2021-07-12 ENCOUNTER — OFFICE VISIT (OUTPATIENT)
Dept: PHYSICAL THERAPY | Age: 71
End: 2021-07-12
Attending: FAMILY MEDICINE
Payer: MEDICARE

## 2021-07-12 PROCEDURE — 97140 MANUAL THERAPY 1/> REGIONS: CPT

## 2021-07-12 PROCEDURE — 97116 GAIT TRAINING THERAPY: CPT

## 2021-07-12 PROCEDURE — 97110 THERAPEUTIC EXERCISES: CPT

## 2021-07-12 RX ORDER — SERTRALINE HYDROCHLORIDE 25 MG/1
TABLET, FILM COATED ORAL
Qty: 90 TABLET | Refills: 0 | Status: SHIPPED | OUTPATIENT
Start: 2021-07-12 | End: 2021-09-20

## 2021-07-12 NOTE — PROGRESS NOTES
Diagnosis: L elbow pain; impaired balance and gait             Insurance (Authorized # of Visits):  medicare           Authorizing Physician: Tenzin Christie MD visit: none scheduled  Fall Risk: High         Precautions: n/a             Pranav Jay comprehensive HEP to maintain progress achieved in PT -progress    Plan: review final HEP and D/C next visit  6/23/21  8/10 Date: 6/29/2021  Tx#: 9/10 Date: 7/1/2021  Tx#: 10/14 Date: 7/6/2021  Tx#: 11/14 Date: 7/8/2021  Tx#: 12/14 Date: 7/12/2021  Tx#: 13 Therex  RTB Side step  Band at ankles 2x30 ft lap  -A/P v-stepping 1x30 ft lap  Heel raises with ankle inversion 1x15  6 inch step up holding yellow med ball x8 ea    Neuro Re-ed  Standing rhomberg eyes closed in parallel bars 30sx3, staggered 30sx3    Gio Conway over varied hussain heights (foam roll, 6 inch, 12 inch hussain)   x2 laps -good  -with plate balance x2 laps -good  -with cognitive challenge x2 (good)   Gait over airex beam, pads, huseyin disc x3 laps (fair; CGA)  Gait up and over airex pad (2), 4 inch step,

## 2021-07-12 NOTE — TELEPHONE ENCOUNTER
Pt requesting refill of SERTRALINE    Refill approved, sent to pharmacy.     Last Office Visit with Provider: 5/19/21  Appt scheduled on 7/19/21

## 2021-07-14 ENCOUNTER — OFFICE VISIT (OUTPATIENT)
Dept: PHYSICAL THERAPY | Age: 71
End: 2021-07-14
Attending: FAMILY MEDICINE
Payer: MEDICARE

## 2021-07-14 PROCEDURE — 97110 THERAPEUTIC EXERCISES: CPT

## 2021-07-14 PROCEDURE — 97140 MANUAL THERAPY 1/> REGIONS: CPT

## 2021-07-14 NOTE — PROGRESS NOTES
Casimiro De Leon has attended 14 visits in Physical Therapy.    Diagnosis: L elbow pain; impaired balance and gait             Insurance (Authorized # of Visits):  medicare           Authorizing Physician: YEIMI Mccarthy Next MD visit: none sche compliant with comprehensive HEP to maintain progress achieved in PT -MET    Plan: D/C with continued compliance to HEP    Patient/Family/Caregiver was advised of these findings, precautions, and treatment options and has agreed to actively participate in Band at ankles 2x30 ft lap  -A/P v-stepping 1x30 ft lap  Heel raises with ankle inversion 1x15  6 inch step up holding yellow med ball x8 ea  Therex  Cross body stretch 2x30s  tricep stretch 2x30s  Doorway bicep stretch 2x30s  Verbally reviewed balance and

## 2021-07-15 ENCOUNTER — LAB ENCOUNTER (OUTPATIENT)
Dept: LAB | Age: 71
End: 2021-07-15
Attending: FAMILY MEDICINE
Payer: MEDICARE

## 2021-07-15 DIAGNOSIS — E06.3 HYPOTHYROIDISM DUE TO HASHIMOTO'S THYROIDITIS: ICD-10-CM

## 2021-07-15 DIAGNOSIS — E03.8 HYPOTHYROIDISM DUE TO HASHIMOTO'S THYROIDITIS: ICD-10-CM

## 2021-07-15 DIAGNOSIS — E11.9 CONTROLLED TYPE 2 DIABETES MELLITUS WITHOUT COMPLICATION, WITHOUT LONG-TERM CURRENT USE OF INSULIN (HCC): ICD-10-CM

## 2021-07-15 LAB
EST. AVERAGE GLUCOSE BLD GHB EST-MCNC: 137 MG/DL (ref 68–126)
HBA1C MFR BLD HPLC: 6.4 % (ref ?–5.7)
T4 FREE SERPL-MCNC: 0.9 NG/DL (ref 0.8–1.7)
TSI SER-ACNC: 4.76 MIU/ML (ref 0.36–3.74)

## 2021-07-15 PROCEDURE — 36415 COLL VENOUS BLD VENIPUNCTURE: CPT

## 2021-07-15 PROCEDURE — 84443 ASSAY THYROID STIM HORMONE: CPT

## 2021-07-15 PROCEDURE — 84439 ASSAY OF FREE THYROXINE: CPT

## 2021-07-15 PROCEDURE — 83036 HEMOGLOBIN GLYCOSYLATED A1C: CPT

## 2021-07-15 NOTE — PROGRESS NOTES
Hemoglobin A1c 6.4 improved repeat again in 6 months. Thyroid-stimulating hormone still elevated increase levothyroxine from 25 mcg up to 50 mcg. Repeat TSH and T4 free in 12 weeks.   Order future tests/medications sent to my chart

## 2021-07-19 ENCOUNTER — OFFICE VISIT (OUTPATIENT)
Dept: FAMILY MEDICINE CLINIC | Facility: CLINIC | Age: 71
End: 2021-07-19
Payer: MEDICARE

## 2021-07-19 ENCOUNTER — APPOINTMENT (OUTPATIENT)
Dept: PHYSICAL THERAPY | Age: 71
End: 2021-07-19
Attending: FAMILY MEDICINE
Payer: MEDICARE

## 2021-07-19 VITALS
SYSTOLIC BLOOD PRESSURE: 116 MMHG | HEART RATE: 72 BPM | HEIGHT: 64.69 IN | WEIGHT: 198 LBS | DIASTOLIC BLOOD PRESSURE: 64 MMHG | TEMPERATURE: 97 F | BODY MASS INDEX: 33.39 KG/M2

## 2021-07-19 DIAGNOSIS — Z13.0 SCREENING FOR DEFICIENCY ANEMIA: ICD-10-CM

## 2021-07-19 DIAGNOSIS — F41.9 MILD ANXIETY: ICD-10-CM

## 2021-07-19 DIAGNOSIS — F32.5 DEPRESSION, MAJOR, IN REMISSION (HCC): ICD-10-CM

## 2021-07-19 DIAGNOSIS — Z12.5 SCREENING FOR PROSTATE CANCER: ICD-10-CM

## 2021-07-19 DIAGNOSIS — I10 ESSENTIAL HYPERTENSION WITH GOAL BLOOD PRESSURE LESS THAN 130/80: ICD-10-CM

## 2021-07-19 DIAGNOSIS — E11.9 CONTROLLED TYPE 2 DIABETES MELLITUS WITHOUT COMPLICATION, WITHOUT LONG-TERM CURRENT USE OF INSULIN (HCC): ICD-10-CM

## 2021-07-19 DIAGNOSIS — Z97.4 DOES USE HEARING AID: ICD-10-CM

## 2021-07-19 DIAGNOSIS — B35.1 TOENAIL FUNGUS: ICD-10-CM

## 2021-07-19 DIAGNOSIS — E78.2 MIXED HYPERLIPIDEMIA: ICD-10-CM

## 2021-07-19 DIAGNOSIS — Z00.00 ENCOUNTER FOR ANNUAL HEALTH EXAMINATION: Primary | ICD-10-CM

## 2021-07-19 DIAGNOSIS — G47.33 OSA (OBSTRUCTIVE SLEEP APNEA): ICD-10-CM

## 2021-07-19 DIAGNOSIS — F90.0 ATTENTION DEFICIT HYPERACTIVITY DISORDER (ADHD), PREDOMINANTLY INATTENTIVE TYPE: ICD-10-CM

## 2021-07-19 DIAGNOSIS — Z12.5 SCREENING PSA (PROSTATE SPECIFIC ANTIGEN): ICD-10-CM

## 2021-07-19 DIAGNOSIS — E66.9 OBESITY (BMI 30.0-34.9): ICD-10-CM

## 2021-07-19 DIAGNOSIS — E06.3 HYPOTHYROIDISM DUE TO HASHIMOTO'S THYROIDITIS: ICD-10-CM

## 2021-07-19 DIAGNOSIS — E03.8 HYPOTHYROIDISM DUE TO HASHIMOTO'S THYROIDITIS: ICD-10-CM

## 2021-07-19 PROBLEM — H61.21 HEARING LOSS OF RIGHT EAR DUE TO CERUMEN IMPACTION: Status: RESOLVED | Noted: 2020-03-03 | Resolved: 2021-07-19

## 2021-07-19 PROBLEM — L84 PRE-ULCERATIVE CORN OR CALLOUS: Status: RESOLVED | Noted: 2020-03-03 | Resolved: 2021-07-19

## 2021-07-19 PROCEDURE — 99214 OFFICE O/P EST MOD 30 MIN: CPT | Performed by: FAMILY MEDICINE

## 2021-07-19 PROCEDURE — G0439 PPPS, SUBSEQ VISIT: HCPCS | Performed by: FAMILY MEDICINE

## 2021-07-19 RX ORDER — FLASH GLUCOSE SENSOR
KIT MISCELLANEOUS
Qty: 1 EACH | Refills: 0 | Status: SHIPPED | OUTPATIENT
Start: 2021-07-19

## 2021-07-19 RX ORDER — BLOOD-GLUCOSE METER
KIT MISCELLANEOUS
Qty: 100 STRIP | Refills: 3 | Status: SHIPPED | OUTPATIENT
Start: 2021-07-19 | End: 2022-02-05

## 2021-07-19 RX ORDER — LEVOTHYROXINE SODIUM 0.05 MG/1
50 TABLET ORAL
Qty: 90 TABLET | Refills: 1 | Status: SHIPPED | OUTPATIENT
Start: 2021-07-19 | End: 2022-01-11

## 2021-07-19 RX ORDER — ATORVASTATIN CALCIUM 10 MG/1
10 TABLET, FILM COATED ORAL DAILY
Qty: 90 TABLET | Refills: 3 | Status: SHIPPED | OUTPATIENT
Start: 2021-07-19

## 2021-07-19 RX ORDER — AMLODIPINE BESYLATE 5 MG/1
5 TABLET ORAL DAILY
Qty: 90 TABLET | Refills: 1 | Status: SHIPPED | OUTPATIENT
Start: 2021-07-19

## 2021-07-19 RX ORDER — FLASH GLUCOSE SENSOR
KIT MISCELLANEOUS
Qty: 2 EACH | Refills: 5 | Status: SHIPPED | OUTPATIENT
Start: 2021-07-19

## 2021-07-19 RX ORDER — LOSARTAN POTASSIUM AND HYDROCHLOROTHIAZIDE 25; 100 MG/1; MG/1
1 TABLET ORAL DAILY
Qty: 90 TABLET | Refills: 1 | Status: SHIPPED | OUTPATIENT
Start: 2021-07-19 | End: 2022-01-28

## 2021-07-19 RX ORDER — DEXTROAMPHETAMINE SACCHARATE, AMPHETAMINE ASPARTATE MONOHYDRATE, DEXTROAMPHETAMINE SULFATE AND AMPHETAMINE SULFATE 7.5; 7.5; 7.5; 7.5 MG/1; MG/1; MG/1; MG/1
30 CAPSULE, EXTENDED RELEASE ORAL DAILY
Qty: 30 CAPSULE | Refills: 0 | Status: SHIPPED | OUTPATIENT
Start: 2021-07-19 | End: 2021-08-17

## 2021-07-19 RX ORDER — METFORMIN HYDROCHLORIDE 750 MG/1
750 TABLET, EXTENDED RELEASE ORAL 2 TIMES DAILY WITH MEALS
Qty: 180 TABLET | Refills: 1 | Status: SHIPPED | OUTPATIENT
Start: 2021-07-19

## 2021-07-19 NOTE — TELEPHONE ENCOUNTER
Olga Romero had a visit with Hersanay Gage today but he forgot to tell her he needs a refill on his Adderrall, please send it to his [employer] [facility] [employer] in [place] on file, Please call Olga Romero at [phone] with any questions

## 2021-07-19 NOTE — PROGRESS NOTES
HPI:   Suzie Fields is a 80year old male who presents for a Medicare Subsequent Annual Wellness visit (Pt already had Initial Annual Wellness).   Specialists   gastroenterology Dr. Denise Mclain colonoscopy due 4/25/2026  Orthopedic surgeon Dr. Stephanie Rosales depression. Needs refill Adderall states that he has been better with his attention span and memory.       Depression, major, in remission ([facility] [address]  Mild anxiety  Presently on sertraline 25 mg daily and Wellbutrin 300 mg extended release tolerates both medica activities?: 0-No  Have you had any memory issues?: 1-Yes  Fall/Risk Scorin  Scoring Interpretation: 4+ At Risk      Cognitive Assessment   He had a completely normal cognitive assessment- see flowsheet entries    Functional Ability/Status   Fela Barreto (Physical Therapy)    Patient Active Problem List:     DICK (obstructive sleep apnea)     Primary osteoarthritis involving multiple joints     Varicose veins of left leg with edema     Mixed hyperlipidemia     Controlled type 2 diabetes mellitus without com daily.  atorvastatin 10 MG Oral Tab, Take 1 tablet (10 mg total) by mouth daily. Losartan Potassium-HCTZ 100-25 MG Oral Tab, Take 1 tablet by mouth daily.   Levothyroxine Sodium 50 MCG Oral Tab, Take 1 tablet (50 mcg total) by mouth before breakfast.  SERT Overweight(278.02), Pre-ulcerative corn or callous (3/3/2020), Preoperative examination, unspecified (10/28/10), and Psychosexual dysfunction with inhibited sexual excitement.     He  has a past surgical history that includes colonoscopy (01/01/05); leg/ank body mass index is 33.27 kg/m² as calculated from the following:    Height as of this encounter: 5' 4.69\" (1.643 m). Weight as of this encounter: 198 lb (89.8 kg).     Medicare Hearing Assessment  (Required for AWV/SWV)    Hearing decreased with hearing • FLU VAC High Dose 65 YRS & Older PRSV Free (41255) 10/28/2019, 09/21/2020   • FLUAD High Dose 65 yr and older (83826) 09/25/2018   • FLULAVAL 6 months & older 0.5 ml Prefilled syringe (07480) 11/10/2017   • FLUZONE 6 months and older PFS 0.5 ml (04217) lipid  Essential hypertension with goal blood pressure less than 130/80  -     amLODIPine Besylate 5 MG Oral Tab; Take 1 tablet (5 mg total) by mouth daily.  -     Losartan Potassium-HCTZ 100-25 MG Oral Tab;  Take 1 tablet by mouth daily.  -     COMP METABO the plan. Further testing ordered. Imaging studies ordered. Lab work ordered. Reinforced healthy diet, lifestyle, and exercise. No follow-ups on file.      Valeria Valdes PA-C, 7/19/2021     General Health     In the past six months, have you lost Screening  Covered for ages 52-80; only need ONE of the following:    Colonoscopy   Covered every 10 years    Covered every 2 years if patient is at high risk or previous colonoscopy was abnormal 04/25/2016    Colonoscopy due on 04/25/2026    Flexible Sigm Component Value Date    K 3.8 04/12/2021         Creatinine   Annually Lab Results   Component Value Date    CREATSERUM 0.91 04/12/2021         BUN Annually Lab Results   Component Value Date    BUN 16 04/12/2021       Drug Serum Conc Annually No results

## 2021-07-19 NOTE — PATIENT INSTRUCTIONS
Routine Healthcare for Men   Routine checkups can find treatable problems early. For many medical problems, early treatment can help prevent more serious complications. The value of checkups and how often you have them depend mainly on your age.  Your perso controversial. Many studies have been done, but they do not yet show that it is practical to do it on all men at their checkups. The test often gives misleading results and can cause undue anxiety, expense, and unnecessary medical procedures.  You should di whooping cough (pertussis) as well as tetanus. If you are 72 or older, this new vaccine has not yet been approved for your age group.  Because babies are most susceptible to complications from whooping cough, Tdap is especially recommended for adults caring vegetables in your diet. Get regular physical activity or exercise. Injury prevention: Use lap and shoulder belts when you drive. Use a helmet when you ride a motorcycle or bicycle.  If you are around guns or other firearms, practice safe handling and yosvany Aneurysm (AAA) Covered once in a lifetime for one of the following risk factors   • Men who are 73-68 years old and have ever smoked   • Anyone with a family history -     Colorectal Cancer Screening  Covered for ages 52-80; only need ONE of the following: LDL 61 04/12/2021       Dilated Eye Exam Annually 05/11/2021       Annual Monitoring of Persistent Medications (ACE/ARB, digoxin diuretics, anticonvulsants)    Potassium Annually Lab Results   Component Value Date    K 3.8 04/12/2021         Creatinine   A

## 2021-08-16 ENCOUNTER — TELEPHONE (OUTPATIENT)
Dept: FAMILY MEDICINE CLINIC | Facility: CLINIC | Age: 71
End: 2021-08-16

## 2021-08-16 NOTE — TELEPHONE ENCOUNTER
Patient c/o left eye lid swollen, itching. Redness to sclera. Discharge on Saturday. No vision changes, No eye pain. Appointment made with Dr. Doug Wong.

## 2021-08-17 ENCOUNTER — OFFICE VISIT (OUTPATIENT)
Dept: FAMILY MEDICINE CLINIC | Facility: CLINIC | Age: 71
End: 2021-08-17
Payer: MEDICARE

## 2021-08-17 VITALS
BODY MASS INDEX: 34.49 KG/M2 | RESPIRATION RATE: 16 BRPM | SYSTOLIC BLOOD PRESSURE: 116 MMHG | OXYGEN SATURATION: 99 % | WEIGHT: 202 LBS | TEMPERATURE: 97 F | DIASTOLIC BLOOD PRESSURE: 66 MMHG | HEART RATE: 80 BPM | HEIGHT: 64 IN

## 2021-08-17 DIAGNOSIS — F90.0 ATTENTION DEFICIT HYPERACTIVITY DISORDER (ADHD), PREDOMINANTLY INATTENTIVE TYPE: ICD-10-CM

## 2021-08-17 DIAGNOSIS — H01.00B BLEPHARITIS OF BOTH UPPER AND LOWER EYELID OF LEFT EYE, UNSPECIFIED TYPE: Primary | ICD-10-CM

## 2021-08-17 PROCEDURE — 99213 OFFICE O/P EST LOW 20 MIN: CPT | Performed by: FAMILY MEDICINE

## 2021-08-17 RX ORDER — DEXTROAMPHETAMINE SACCHARATE, AMPHETAMINE ASPARTATE MONOHYDRATE, DEXTROAMPHETAMINE SULFATE AND AMPHETAMINE SULFATE 7.5; 7.5; 7.5; 7.5 MG/1; MG/1; MG/1; MG/1
30 CAPSULE, EXTENDED RELEASE ORAL DAILY
Qty: 30 CAPSULE | Refills: 0 | Status: SHIPPED | OUTPATIENT
Start: 2021-08-17 | End: 2021-09-16

## 2021-08-17 RX ORDER — GENTAMICIN SULFATE 3 MG/ML
2 SOLUTION/ DROPS OPHTHALMIC 3 TIMES DAILY
Qty: 5 ML | Refills: 0 | Status: SHIPPED | OUTPATIENT
Start: 2021-08-17 | End: 2021-08-24

## 2021-08-17 NOTE — TELEPHONE ENCOUNTER
Last fill was 7/19/21  Last OV 7/19/21     Attention deficit hyperactivity disorder (ADHD), predominantly inattentive type  Refill on Adderall extended release    FOV 9/20/21

## 2021-08-17 NOTE — PROGRESS NOTES
Nhi De Leon is a 80year old male.     HPI:   Patient presents with:  Eye Problem: left eye red/puffy x3 days    This 28-year-old male presents to the office with complaint of itching, swelling to the left upper eyelid, redness to the eye and discha Maternal Grandmother    • Heart Disorder Maternal Grandmother         CAD   • Heart Attack Maternal Grandmother    • Heart Attack Maternal Grandfather    • No Known Problems Son    • No Known Problems Son    • No Known Problems Son       Social History:  So TABLET(300 MG) BY MOUTH DAILY 90 tablet 0   • Diclofenac Sodium 50 MG Oral Tab EC Take 1 tablet (50 mg total) by mouth Every other day PRN.  30 tablet 2   • FreeStyle Lancets Does not apply Misc Take blood sugar in the AM fasting 2 Box 2   • lidocaine 5 % E Acuity: Corrected Right Eye Chart Acuity: 20/30   Left Eye Visual Acuity: Corrected Left Eye Chart Acuity: 20/30   Both Eyes Visual Acuity: Corrected Both Eyes Chart Acuity: 20/30       General: Well-nourished, well hydrated. No acute distress. No pallor. Referrals:  None     Patient understands plan and follow-up. FU in 1 week with his eye doctor if not improving.     8/17/2021  Marques Camargo DO    Total time: 20 minutes including precharting, H&P, plan of care

## 2021-08-17 NOTE — PATIENT INSTRUCTIONS
Use the Gentamicin eye drops 2 drops to the left eye three times a day for one week. If symptoms start in the right eye, you may place the drops in the right eye as well.     Apply Kang's baby shampoo to a wash cloth and gently rub the eyelashes twice da

## 2021-08-22 DIAGNOSIS — E11.9 CONTROLLED TYPE 2 DIABETES MELLITUS WITHOUT COMPLICATION, WITHOUT LONG-TERM CURRENT USE OF INSULIN (HCC): ICD-10-CM

## 2021-08-23 RX ORDER — LANCETS 28 GAUGE
EACH MISCELLANEOUS
Qty: 200 EACH | Refills: 1 | Status: SHIPPED | OUTPATIENT
Start: 2021-08-23

## 2021-08-23 NOTE — TELEPHONE ENCOUNTER
Pt requesting refill of LANCETS    Passed protocol, refill approved, sent to pharmacy.     Last Office Visit with Provider: 8/17/21    Appt scheduled on 9/20/21

## 2021-09-02 DIAGNOSIS — F32.4 MAJOR DEPRESSIVE DISORDER WITH SINGLE EPISODE, IN PARTIAL REMISSION (HCC): ICD-10-CM

## 2021-09-03 RX ORDER — BUPROPION HYDROCHLORIDE 300 MG/1
TABLET ORAL
Qty: 90 TABLET | Refills: 0 | Status: SHIPPED | OUTPATIENT
Start: 2021-09-03 | End: 2021-12-13

## 2021-09-03 NOTE — TELEPHONE ENCOUNTER
Requested Prescriptions     Pending Prescriptions Disp Refills   • BUPROPION 300 MG Oral Tablet 24 Hr [Pharmacy Med Name: BUPROPION XL 300MG TABLETS] 90 tablet 0     Sig: TAKE 1 TABLET(300 MG) BY MOUTH DAILY     Last fill was 6/3/21 90 tabs 0 refill  Last

## 2021-09-16 ENCOUNTER — TELEPHONE (OUTPATIENT)
Dept: FAMILY MEDICINE CLINIC | Facility: CLINIC | Age: 71
End: 2021-09-16

## 2021-09-16 ENCOUNTER — LAB ENCOUNTER (OUTPATIENT)
Dept: LAB | Age: 71
End: 2021-09-16
Attending: FAMILY MEDICINE
Payer: MEDICARE

## 2021-09-16 DIAGNOSIS — R97.20 ELEVATED PSA: Primary | ICD-10-CM

## 2021-09-16 DIAGNOSIS — Z12.5 SCREENING PSA (PROSTATE SPECIFIC ANTIGEN): ICD-10-CM

## 2021-09-16 DIAGNOSIS — Z13.0 SCREENING FOR DEFICIENCY ANEMIA: ICD-10-CM

## 2021-09-16 DIAGNOSIS — D72.829 LEUKOCYTOSIS, UNSPECIFIED TYPE: ICD-10-CM

## 2021-09-16 DIAGNOSIS — E06.3 HYPOTHYROIDISM DUE TO HASHIMOTO'S THYROIDITIS: ICD-10-CM

## 2021-09-16 DIAGNOSIS — E03.8 HYPOTHYROIDISM DUE TO HASHIMOTO'S THYROIDITIS: ICD-10-CM

## 2021-09-16 LAB
BASOPHILS # BLD AUTO: 0.06 X10(3) UL (ref 0–0.2)
BASOPHILS NFR BLD AUTO: 0.5 %
COMPLEXED PSA SERPL-MCNC: 5.13 NG/ML (ref ?–4)
EOSINOPHIL # BLD AUTO: 0.35 X10(3) UL (ref 0–0.7)
EOSINOPHIL NFR BLD AUTO: 2.9 %
ERYTHROCYTE [DISTWIDTH] IN BLOOD BY AUTOMATED COUNT: 13.6 %
HCT VFR BLD AUTO: 42.9 %
HGB BLD-MCNC: 14 G/DL
IMM GRANULOCYTES # BLD AUTO: 0.04 X10(3) UL (ref 0–1)
IMM GRANULOCYTES NFR BLD: 0.3 %
LYMPHOCYTES # BLD AUTO: 2.97 X10(3) UL (ref 1–4)
LYMPHOCYTES NFR BLD AUTO: 24.6 %
MCH RBC QN AUTO: 28.1 PG (ref 26–34)
MCHC RBC AUTO-ENTMCNC: 32.6 G/DL (ref 31–37)
MCV RBC AUTO: 86.1 FL
MONOCYTES # BLD AUTO: 0.95 X10(3) UL (ref 0.1–1)
MONOCYTES NFR BLD AUTO: 7.9 %
NEUTROPHILS # BLD AUTO: 7.69 X10 (3) UL (ref 1.5–7.7)
NEUTROPHILS # BLD AUTO: 7.69 X10(3) UL (ref 1.5–7.7)
NEUTROPHILS NFR BLD AUTO: 63.8 %
PLATELET # BLD AUTO: 354 10(3)UL (ref 150–450)
RBC # BLD AUTO: 4.98 X10(6)UL
T4 FREE SERPL-MCNC: 1 NG/DL (ref 0.8–1.7)
TSI SER-ACNC: 2.96 MIU/ML (ref 0.36–3.74)
WBC # BLD AUTO: 12.1 X10(3) UL (ref 4–11)

## 2021-09-16 PROCEDURE — 84439 ASSAY OF FREE THYROXINE: CPT

## 2021-09-16 PROCEDURE — 84443 ASSAY THYROID STIM HORMONE: CPT

## 2021-09-16 PROCEDURE — 85025 COMPLETE CBC W/AUTO DIFF WBC: CPT

## 2021-09-16 PROCEDURE — 36415 COLL VENOUS BLD VENIPUNCTURE: CPT

## 2021-09-16 NOTE — TELEPHONE ENCOUNTER
----- Message from Jennifer Anderson PA-C sent at 9/16/2021  3:21 PM CDT -----  Refer patient to urologist for elevated PSA  Dr Margo Loyola.   Complete blood count slight elevation of white blood cell count if any symptoms of infection follow-up in the office or l

## 2021-09-16 NOTE — PROGRESS NOTES
Refer patient to urologist for elevated PSA  Dr Tim Segundo. Complete blood count slight elevation of white blood cell count if any symptoms of infection follow-up in the office or leave my chart message. Repeat CBC in 1 month.   Thyroid testing is normal.  Orde

## 2021-09-20 NOTE — PROGRESS NOTES
Yaneth Couch is a 80year old male. HPI:   Patient is in to discuss abnormal PSA results and get referral for urologist.  Also states that recently he has been more agitated and less attentive.   Feels out of sorts states that he feels the ADD medic 3.80 - 5.80 x10(6)uL    HGB 14.0 13.0 - 17.5 g/dL    HCT 42.9 39.0 - 53.0 %    .0 150.0 - 450.0 10(3)uL    MCV 86.1 80.0 - 100.0 fL    MCH 28.1 26.0 - 34.0 pg    MCHC 32.6 31.0 - 37.0 g/dL    RDW 13.6 %    Neutrophil Absolute Prelim 7.69 1.50 - 7.70 mg total) by mouth daily. 90 tablet 3   • Losartan Potassium-HCTZ 100-25 MG Oral Tab Take 1 tablet by mouth daily.  90 tablet 1   • Levothyroxine Sodium 50 MCG Oral Tab Take 1 tablet (50 mcg total) by mouth before breakfast. 90 tablet 1   • lidocaine 5 % Ex years: 0        Types: Cigarettes        Start date: 1968        Quit date: 1974        Years since quittin.3      Smokeless tobacco: Never Used    Vaping Use      Vaping Use: Never used    Alcohol use: No      Alcohol/week: 0.0 standard dri [09537]      Meds & Refills for this Visit:  Requested Prescriptions     Signed Prescriptions Disp Refills   • Dexmethylphenidate HCl ER 15 MG Oral Capsule SR 24 Hr 30 capsule 0     Sig: Take 1 capsule (15 mg total) by mouth daily.    • sertraline 50 MG Ora time was spent reviewing chart, reviewing blood work and radiology tests. The patient indicates understanding of these issues and agrees to the plan. The patient is asked to return in  1 month.

## 2021-10-02 DIAGNOSIS — F32.5 DEPRESSION, MAJOR, IN REMISSION (HCC): ICD-10-CM

## 2021-10-04 RX ORDER — SERTRALINE HYDROCHLORIDE 25 MG/1
TABLET, FILM COATED ORAL
Qty: 90 TABLET | Refills: 0 | OUTPATIENT
Start: 2021-10-04

## 2021-10-04 NOTE — TELEPHONE ENCOUNTER
sertraline 50 MG Oral Tab 90 tablet 0 9/20/2021    Sig:   Take 1 tablet (50 mg total) by mouth daily.        Request too soon  Refill denied

## 2021-10-19 DIAGNOSIS — M15.9 PRIMARY OSTEOARTHRITIS INVOLVING MULTIPLE JOINTS: ICD-10-CM

## 2021-10-19 NOTE — TELEPHONE ENCOUNTER
Name from pharmacy: DICLOFENAC SODIUM 50MG DR TABLETS          Will file in chart as: DICLOFENAC 50 MG Oral Tab EC    The original prescription was discontinued on 9/20/2021 by Johnathan Baeza PA-C. Renewing this prescription may not be appropriate.

## 2021-10-21 NOTE — PROGRESS NOTES
Ana Mullen is a 80year old male. HPI:   Please note that the following visit was completed using two-way, real-time interactive audio and video communication.   This has been done in good vasyl to provide continuity of care in the best interest o [START ON 12/21/2021] Dexmethylphenidate HCl ER (FOCALIN XR) 20 MG Oral Capsule SR 24 Hr Take 1 capsule (20 mg total) by mouth daily. 30 capsule 0   • sertraline 50 MG Oral Tab Take 1 tablet (50 mg total) by mouth daily.  90 tablet 0   • BUPROPION 300 MG Or 2 (two) times daily. • Omega-3 Fatty Acids (FISH OIL TRIPLE STRENGTH) 1400 MG Oral Cap Take 1 capsule by mouth daily. • Multiple Vitamins-Minerals (CENTRUM SILVER ULTRA MENS) Oral Tab Take 1 tablet by mouth daily.      • ZYRTEC 10 MG OR TABS 1 TAB allergies. GENERAL: Patient is speaking in full sentences no increased work in breathing patient is alert and orientated x3.     Psych patient's mood is normal and communication skills are good  Patient appears calm mood is normal.  No tics or tremors obse Dispense: 30 capsule; Refill: 0    2. Medication management        The patient indicates understanding of these issues and agrees to the plan. The patient is asked to return in 3 months or as needed.

## 2021-10-26 NOTE — PROGRESS NOTES
HPI:     Yelena Pruitt ([facility]) is a 80year old male with a PMH of ADD, HTN, HL, DM, hypothyroid, allergies, diverticulosis, hemorroids. He presents as a consult from [facility] office with elevated PSA, BPH/LUTS, OAB/UI.     Prior KNEE ARTHROSCP [facility]  11/15/10    Total; Left; Laterality   • KNEE REPLACEMENT SURGERY  2011    left TKA by Dr. Zeny Brown   • Dean  01/01/60    Leg Osteotomy Tibial   • OTHER SURGICAL HISTORY      left tibial osteotomy as teen   • TO • Continuous Blood Gluc Sensor ([facility]) Does not apply Misc Apply new device every 2 weeks 2 each 5   • Continuous Blood Gluc  (FREESTYLE MITCHELL [employer]) Does not apply Device Use daily 1 each 0   • Glucose Blood (FREESTYLE L A comprehensive 10 point review of systems was completed. Pertinent positives and negatives noted in the the HPI.     PHYSICAL EXAM:     GENERAL APPEARANCE: well, developed, well nourished, in no acute distress  NEUROLOGIC: nonfocal, alert and oriented

## 2021-11-02 ENCOUNTER — LAB ENCOUNTER (OUTPATIENT)
Dept: LAB | Age: 71
End: 2021-11-02
Attending: UROLOGY
Payer: MEDICARE

## 2021-11-02 ENCOUNTER — OFFICE VISIT (OUTPATIENT)
Dept: SURGERY | Facility: CLINIC | Age: 71
End: 2021-11-02
Payer: MEDICARE

## 2021-11-02 DIAGNOSIS — N13.8 BPH WITH OBSTRUCTION/LOWER URINARY TRACT SYMPTOMS: ICD-10-CM

## 2021-11-02 DIAGNOSIS — N52.9 ERECTILE DYSFUNCTION, UNSPECIFIED ERECTILE DYSFUNCTION TYPE: ICD-10-CM

## 2021-11-02 DIAGNOSIS — N40.1 BPH WITH OBSTRUCTION/LOWER URINARY TRACT SYMPTOMS: ICD-10-CM

## 2021-11-02 DIAGNOSIS — R97.20 ELEVATED PSA: Primary | ICD-10-CM

## 2021-11-02 PROCEDURE — 36415 COLL VENOUS BLD VENIPUNCTURE: CPT

## 2021-11-02 PROCEDURE — 51798 US URINE CAPACITY MEASURE: CPT | Performed by: UROLOGY

## 2021-11-02 PROCEDURE — 81003 URINALYSIS AUTO W/O SCOPE: CPT | Performed by: UROLOGY

## 2021-11-02 PROCEDURE — 99204 OFFICE O/P NEW MOD 45 MIN: CPT | Performed by: UROLOGY

## 2021-11-05 ENCOUNTER — TELEPHONE (OUTPATIENT)
Dept: SURGERY | Facility: CLINIC | Age: 71
End: 2021-11-05

## 2021-11-05 DIAGNOSIS — R97.20 ELEVATED PSA: Primary | ICD-10-CM

## 2021-11-05 RX ORDER — CEFDINIR 300 MG/1
300 CAPSULE ORAL EVERY 12 HOURS
Qty: 6 CAPSULE | Refills: 0 | Status: SHIPPED | OUTPATIENT
Start: 2021-11-05 | End: 2021-11-08

## 2021-11-05 RX ORDER — CIPROFLOXACIN 500 MG/1
500 TABLET, FILM COATED ORAL 2 TIMES DAILY
Qty: 6 TABLET | Refills: 0 | Status: SHIPPED | OUTPATIENT
Start: 2021-11-05 | End: 2021-11-08

## 2021-11-05 NOTE — TELEPHONE ENCOUNTER
4k score corrected result received and given to Dr Karlie Ross to review. 4k score result received but has incorrect . Nba Villalta at [facility] and correction will be made an report will be re faxed.

## 2021-11-05 NOTE — TELEPHONE ENCOUNTER
Could you please call patient and schedule him for prostate biopsy? I think I had a cancellation next Wedensday afternoon at 1:30 if he wants to do that. Below if for Documentation Purposes Only:    4K 29%, repeat PSA 4.23.  Pt wants to proceed with biop

## 2021-11-09 NOTE — TELEPHONE ENCOUNTER
I called the pt, scheduled him for prostate biopsy and follow up visit,  and I discussed procedure instructions. Instructions also sent through [employer]. Pt verbalized understanding and all questions were answered.

## 2021-11-18 NOTE — H&P
HPI:     Joy Da Silva ([facility]) is a 80year old male with a PMH of ADD, HTN, HL, DM, hypothyroid, allergies, diverticulosis, hemorroids. Following for:  1. Elevated PSA  2. BPH/LUTS  - no meds   3. OAB/UI  - no meds  4.  ED  - pt prefers shows ~ 40 g prostate, no nodules or tenderness    UA is negative    Gross hematuria: none  Tobacco hx: < 4 pack years, quit 1974  Kidney stone hx: none  Fam h/o  malignancy: none    He has ED but this is not a priority for him.     We discussed options f CAD   • Heart Attack Maternal Grandmother    • Heart Attack Maternal Grandfather    • No Known Problems Son    • No Known Problems Son    • No Known Problems Son       Social History: Social History    Tobacco Use      Smoking status: Former Smoker 1   • Levothyroxine Sodium 50 MCG Oral Tab Take 1 tablet (50 mcg total) by mouth before breakfast. 90 tablet 1   • lidocaine 5 % External Patch PLACE 1 PATCH ONTO THE SKIN DAILY.  PUT ON FOR 12 HOURS THEN OFF FOR 12 HOURS 30 patch 5   • Probiotic Product (P PROSTATE,NEEDLE/PUNCH    BPH with obstruction/lower urinary tract symptoms    Erectile dysfunction, unspecified erectile dysfunction type      - f/u in 1 w to review results  - observation for BPH, OAB, ED    Thanks again for this consult.         Alis Rodriguez

## 2021-11-23 ENCOUNTER — PROCEDURE (OUTPATIENT)
Dept: SURGERY | Facility: CLINIC | Age: 71
End: 2021-11-23
Payer: MEDICARE

## 2021-11-23 VITALS — HEART RATE: 87 BPM | DIASTOLIC BLOOD PRESSURE: 66 MMHG | SYSTOLIC BLOOD PRESSURE: 117 MMHG | TEMPERATURE: 97 F

## 2021-11-23 DIAGNOSIS — N40.1 BPH WITH OBSTRUCTION/LOWER URINARY TRACT SYMPTOMS: ICD-10-CM

## 2021-11-23 DIAGNOSIS — N52.9 ERECTILE DYSFUNCTION, UNSPECIFIED ERECTILE DYSFUNCTION TYPE: ICD-10-CM

## 2021-11-23 DIAGNOSIS — R97.20 ELEVATED PSA: Primary | ICD-10-CM

## 2021-11-23 DIAGNOSIS — N13.8 BPH WITH OBSTRUCTION/LOWER URINARY TRACT SYMPTOMS: ICD-10-CM

## 2021-11-23 PROCEDURE — 55700 BIOPSY OF PROSTATE,NEEDLE/PUNCH: CPT | Performed by: UROLOGY

## 2021-11-26 ENCOUNTER — TELEPHONE (OUTPATIENT)
Dept: SURGERY | Facility: CLINIC | Age: 71
End: 2021-11-26

## 2021-11-26 NOTE — PROGRESS NOTES
HPI:     Parish Ortiz ([facility]) is a 80year old male with a PMH of ADD, HTN, HL, DM, hypothyroid, allergies, diverticulosis, hemorroids. Following for:  1. Elevated PSA  2. BPH/LUTS  - no meds   3. OAB/UI  - no meds  4.  ED  - pt prefers not a bike, ATV, horse, tractor, riding lawnmower.  We discussed that patients typically spend 1 night, possibly 2 in the hospital. He understands the likely transient issues with urinary incontinence following surgery and worsening of erectile function, wh Allergic rhinitis, cause unspecified    • Diabetes [facility])    • Diverticulosis 04/25/2016    Jennifer Paredes M.D.   • Essential hypertension    • Infective otitis externa, unspecified 11/04/10   • Internal hemorrhoids 04/25/2016    Jennifer Paredes M.D.   • Octaviano Paris Alcohol/week: 0.0 standard drinks    Drug use: No       Medications (Active prior to today's visit):  Current Outpatient Medications   Medication Sig Dispense Refill   • Dexmethylphenidate HCl ER (FOCALIN XR) 20 MG Oral Capsule SR 24 Hr Take 1 capsule (20 Tab Take 1 tablet by mouth daily. • Cholecalciferol (VITAMIN D3) 1000 UNITS Oral Cap Take 1 tablet by mouth daily. • Fluticasone Propionate (FLONASE) 50 MCG/ACT Nasal Suspension 2 sprays by Nasal route daily as needed for Allergies.  16 g 5   • Cinn

## 2021-11-26 NOTE — TELEPHONE ENCOUNTER
Pt called stating pt received prostate biopsy results on [employer]. Please call to discuss results.  Pt aware office closes today Friday at 12 noon and returns Monday 11-29-21 at 8am.

## 2021-11-26 NOTE — PATIENT INSTRUCTIONS
Oncotype Dx    Oncotype DX GPS test is a genomic test, meaning that it measures the expression of certain genes in prostate cancer cells.   The results are reported as a Genomic Prostate Score (GPS) result, which can help you and your doctor make decisions aggressive. It involves regular check-ups and ongoing testing.       When to have the test:  If you have been recently diagnosed with clinically low-, intermediate-, and high-risk prostate cancer or have yet to decide on a management option, your biopsy tis

## 2021-11-26 NOTE — TELEPHONE ENCOUNTER
RN called patient. Explained that biopsy result will be discussed at length on his follow up appt next with Dr Tim Segundo. He is agreeable to plans and verbalized understanding.

## 2021-11-30 ENCOUNTER — OFFICE VISIT (OUTPATIENT)
Dept: SURGERY | Facility: CLINIC | Age: 71
End: 2021-11-30
Payer: MEDICARE

## 2021-11-30 ENCOUNTER — TELEPHONE (OUTPATIENT)
Dept: SURGERY | Facility: CLINIC | Age: 71
End: 2021-11-30

## 2021-11-30 DIAGNOSIS — C61 PROSTATE CANCER (HCC): Primary | ICD-10-CM

## 2021-11-30 PROCEDURE — 99215 OFFICE O/P EST HI 40 MIN: CPT | Performed by: UROLOGY

## 2021-11-30 NOTE — TELEPHONE ENCOUNTER
Referral has been ordered for onocotypedx per MPH    Outpatient Lab: [facility] [address] [id number] [facility] [address],[place] [address]  Phone # [phone]  NPI: [id number]  Dx Code: C61  Procedure Code: 1824M  Oncotype Test

## 2021-12-07 NOTE — TELEPHONE ENCOUNTER
Alexis Weber    Referral is approved no auth required since primary medicare    Thanks  [address] [facility]    See referral [id number]

## 2021-12-13 DIAGNOSIS — F32.4 MAJOR DEPRESSIVE DISORDER WITH SINGLE EPISODE, IN PARTIAL REMISSION (HCC): ICD-10-CM

## 2021-12-13 RX ORDER — BUPROPION HYDROCHLORIDE 300 MG/1
TABLET ORAL
Qty: 30 TABLET | Refills: 0 | Status: SHIPPED | OUTPATIENT
Start: 2021-12-13 | End: 2022-01-11

## 2021-12-13 NOTE — TELEPHONE ENCOUNTER
BUPROPION 300 MG Oral Tablet 24 Hr 90 tablet 0 9/3/2021    Sig:   TAKE 1 TABLET(300 MG) BY MOUTH DAILY       LOV 10/20/21  The patient is asked to return in 3 months or as needed  One more refill approved and mychart message sent

## 2021-12-14 DIAGNOSIS — F41.1 GAD (GENERALIZED ANXIETY DISORDER): ICD-10-CM

## 2021-12-15 ENCOUNTER — LAB REQUISITION (OUTPATIENT)
Dept: LAB | Facility: HOSPITAL | Age: 71
End: 2021-12-15
Payer: MEDICARE

## 2021-12-15 DIAGNOSIS — C61 MALIGNANT NEOPLASM OF PROSTATE (HCC): ICD-10-CM

## 2021-12-15 PROCEDURE — 88363 XM ARCHIVE TISSUE MOLEC ANAL: CPT | Performed by: UROLOGY

## 2021-12-15 NOTE — TELEPHONE ENCOUNTER
Requested Prescriptions     Pending Prescriptions Disp Refills   • SERTRALINE 50 MG Oral Tab [Pharmacy Med Name: SERTRALINE 50MG TABLETS] 90 tablet 0     Sig: TAKE 1 TABLET(50 MG) BY MOUTH DAILY     Last fill was 9/20/21 90 tabs  Last OV 10/20/21 virtual

## 2021-12-21 NOTE — CONSULTS
[facility] RADIATION ONCOLOGY CONSULTATION     PATIENT:   Miguel Peña MD:  Sanjana Keen MD      DIAGNOSIS:   T1c N0 M0 prostate cancer        CC:    Discuss radiation therapy    HPI   81 yo here w/ wife. Hx of rising PSA.

## 2021-12-21 NOTE — PROGRESS NOTES
Nursing Consultation Note  Patient: Eleanor Hughes  YOB: 1950  Age: 80year old  Radiation Oncologist: Dr. Neil Mayfield  Referring Physician: No ref.  provider found  [email or web address]  Consult Date: 12/21/2021      Chemotherapy: NA Medication Sig Dispense Refill   • SERTRALINE 50 MG Oral Tab TAKE 1 TABLET(50 MG) BY MOUTH DAILY 30 tablet 0   • BUPROPION 300 MG Oral Tablet 24 Hr TAKE 1 TABLET(300 MG) BY MOUTH DAILY 30 tablet 0   • Dexmethylphenidate HCl ER (FOCALIN XR) 20 MG Oral Cap by mouth 2 (two) times daily. • Omega-3 Fatty Acids (FISH OIL TRIPLE STRENGTH) 1400 MG Oral Cap Take 1 capsule by mouth daily. • Multiple Vitamins-Minerals (CENTRUM SILVER ULTRA MENS) Oral Tab Take 1 tablet by mouth daily.      • ZYRTEC 10 MG OR T Not on file    Tobacco Use      Smoking status: Former Smoker        Packs/day: 0.00        Years: 4.00        Pack years: 0        Types: Cigarettes        Start date: 1968        Quit date: 1974        Years since quittin.6      Smokeless Adequate transportation available for expected visits    Pain:   ;Pain Score: 0   ;    ;

## 2021-12-22 ENCOUNTER — HOSPITAL ENCOUNTER (OUTPATIENT)
Dept: RADIATION ONCOLOGY | Facility: HOSPITAL | Age: 71
Discharge: HOME | End: 2021-12-22
Attending: RADIOLOGY
Payer: MEDICARE

## 2021-12-22 VITALS
RESPIRATION RATE: 20 BRPM | BODY MASS INDEX: 34 KG/M2 | OXYGEN SATURATION: 95 % | HEART RATE: 100 BPM | TEMPERATURE: 98 F | DIASTOLIC BLOOD PRESSURE: 73 MMHG | SYSTOLIC BLOOD PRESSURE: 151 MMHG | WEIGHT: 197.81 LBS

## 2021-12-22 DIAGNOSIS — C61 PROSTATE CANCER (HCC): Primary | ICD-10-CM

## 2021-12-22 PROCEDURE — 99214 OFFICE O/P EST MOD 30 MIN: CPT

## 2021-12-22 NOTE — PATIENT INSTRUCTIONS
OUR RADIATION THERAPISTS WILL CALL YOU TO SCHEDULE YOUR CT SIMULATION IN OUR OFFICE.      PLEASE CALL WITH ANY QUESTIONS OR CONCERNS. [phone]

## 2021-12-28 ENCOUNTER — TELEPHONE (OUTPATIENT)
Dept: SURGERY | Facility: CLINIC | Age: 71
End: 2021-12-28

## 2021-12-28 NOTE — TELEPHONE ENCOUNTER
Oncotype results reviewed:    GPS 22 - c/w favorable intermediate risk disease, likelihood adverse pathology at time of RP is 33% with 4% and 1 % chance of mets and CaP death within 8 y following RP. Patient already planning for XRT.  Left VM with resul

## 2021-12-28 NOTE — TELEPHONE ENCOUNTER
snow from [employer], lab called. Received an order. Need to obtain the hospitals status. Date of collection 11-23-21.   Call

## 2021-12-28 NOTE — TELEPHONE ENCOUNTER
Oncotypedx results given to MPH. MPH reviewed them with the patient and requested that a copy be mailed to the patient.   Results were mailed

## 2021-12-29 ENCOUNTER — HOSPITAL ENCOUNTER (OUTPATIENT)
Dept: RADIATION ONCOLOGY | Facility: HOSPITAL | Age: 71
Discharge: HOME | End: 2021-12-29
Attending: RADIOLOGY
Payer: MEDICARE

## 2021-12-29 PROCEDURE — 77470 SPECIAL RADIATION TREATMENT: CPT | Performed by: RADIOLOGY

## 2021-12-29 PROCEDURE — 77334 RADIATION TREATMENT AID(S): CPT | Performed by: RADIOLOGY

## 2022-01-01 ENCOUNTER — HOSPITAL ENCOUNTER (OUTPATIENT)
Dept: RADIATION ONCOLOGY | Facility: HOSPITAL | Age: 72
Discharge: HOME | End: 2022-01-01
Attending: RADIOLOGY
Payer: MEDICARE

## 2022-01-04 PROCEDURE — 77300 RADIATION THERAPY DOSE PLAN: CPT | Performed by: RADIOLOGY

## 2022-01-04 PROCEDURE — 77301 RADIOTHERAPY DOSE PLAN IMRT: CPT | Performed by: RADIOLOGY

## 2022-01-04 PROCEDURE — 77338 DESIGN MLC DEVICE FOR IMRT: CPT | Performed by: RADIOLOGY

## 2022-01-11 DIAGNOSIS — F41.1 GAD (GENERALIZED ANXIETY DISORDER): ICD-10-CM

## 2022-01-11 DIAGNOSIS — E06.3 HYPOTHYROIDISM DUE TO HASHIMOTO'S THYROIDITIS: ICD-10-CM

## 2022-01-11 DIAGNOSIS — E03.8 HYPOTHYROIDISM DUE TO HASHIMOTO'S THYROIDITIS: ICD-10-CM

## 2022-01-11 DIAGNOSIS — F32.4 MAJOR DEPRESSIVE DISORDER WITH SINGLE EPISODE, IN PARTIAL REMISSION (HCC): ICD-10-CM

## 2022-01-11 RX ORDER — BUPROPION HYDROCHLORIDE 300 MG/1
TABLET ORAL
Qty: 30 TABLET | Refills: 0 | Status: SHIPPED | OUTPATIENT
Start: 2022-01-11

## 2022-01-11 RX ORDER — LEVOTHYROXINE SODIUM 0.05 MG/1
TABLET ORAL
Qty: 90 TABLET | Refills: 0 | Status: SHIPPED | OUTPATIENT
Start: 2022-01-11

## 2022-01-11 NOTE — TELEPHONE ENCOUNTER
Requested Prescriptions     Pending Prescriptions Disp Refills   • LEVOTHYROXINE 50 MCG Oral Tab [Pharmacy Med Name: LEVOTHYROXINE 0.05MG (50MCG) TAB] 90 tablet 1     Sig: TAKE 1 TABLET(50 MCG) BY MOUTH BEFORE BREAKFAST   • BUPROPION 300 MG Oral Tablet 24

## 2022-01-12 ENCOUNTER — HOSPITAL ENCOUNTER (OUTPATIENT)
Dept: RADIATION ONCOLOGY | Facility: HOSPITAL | Age: 72
Discharge: HOME | End: 2022-01-12
Attending: RADIOLOGY
Payer: MEDICARE

## 2022-01-12 PROCEDURE — 77385 HC IMRT SIMPLE: CPT | Performed by: RADIOLOGY

## 2022-01-13 ENCOUNTER — HOSPITAL ENCOUNTER (OUTPATIENT)
Dept: RADIATION ONCOLOGY | Facility: HOSPITAL | Age: 72
Discharge: HOME | End: 2022-01-13
Attending: RADIOLOGY
Payer: MEDICARE

## 2022-01-13 VITALS
SYSTOLIC BLOOD PRESSURE: 137 MMHG | WEIGHT: 199.63 LBS | BODY MASS INDEX: 34 KG/M2 | DIASTOLIC BLOOD PRESSURE: 76 MMHG | RESPIRATION RATE: 16 BRPM | TEMPERATURE: 98 F | HEART RATE: 81 BPM | OXYGEN SATURATION: 96 %

## 2022-01-13 DIAGNOSIS — C61 PROSTATE CANCER (HCC): Primary | ICD-10-CM

## 2022-01-13 PROCEDURE — 77385 HC IMRT SIMPLE: CPT | Performed by: RADIOLOGY

## 2022-01-13 NOTE — PROGRESS NOTES
[facility] Radiation Treatment Management Note 1-5    Patient:  Shelley Stephens  Age:  80year old  Visit Diagnosis:    1.  Prostate cancer [facility])      Primary Rad/Onc:  Dr. Naa Del Rosario    Site Delivered Dose (cGy) Prescribed Dose

## 2022-01-14 PROCEDURE — 77385 HC IMRT SIMPLE: CPT | Performed by: RADIOLOGY

## 2022-01-17 PROCEDURE — 77385 HC IMRT SIMPLE: CPT | Performed by: RADIOLOGY

## 2022-01-18 PROCEDURE — 77385 HC IMRT SIMPLE: CPT | Performed by: RADIOLOGY

## 2022-01-19 ENCOUNTER — OFFICE VISIT (OUTPATIENT)
Dept: HEMATOLOGY/ONCOLOGY | Facility: HOSPITAL | Age: 72
End: 2022-01-19
Attending: RADIOLOGY
Payer: MEDICARE

## 2022-01-19 PROCEDURE — 77385 HC IMRT SIMPLE: CPT | Performed by: RADIOLOGY

## 2022-01-20 ENCOUNTER — HOSPITAL ENCOUNTER (OUTPATIENT)
Dept: RADIATION ONCOLOGY | Facility: HOSPITAL | Age: 72
Discharge: HOME | End: 2022-01-20
Attending: RADIOLOGY
Payer: MEDICARE

## 2022-01-20 VITALS
OXYGEN SATURATION: 97 % | SYSTOLIC BLOOD PRESSURE: 125 MMHG | BODY MASS INDEX: 33.35 KG/M2 | HEART RATE: 86 BPM | WEIGHT: 200.19 LBS | RESPIRATION RATE: 19 BRPM | DIASTOLIC BLOOD PRESSURE: 68 MMHG | TEMPERATURE: 99 F | HEIGHT: 65 IN

## 2022-01-20 DIAGNOSIS — C61 PROSTATE CANCER (HCC): Primary | ICD-10-CM

## 2022-01-20 PROCEDURE — 77385 HC IMRT SIMPLE: CPT | Performed by: RADIOLOGY

## 2022-01-20 NOTE — PROGRESS NOTES
Nutrition Consultation    Patient Name: Hamilton Tom  YOB: 1950  Medical Record Number: [id number]   Account Number: [de-identified]  Dietitian: Oscar Castillo RD, LDN    Date of visit: 1/19/2022    Diet Rx: high protein, low residue prn  MG Oral Tablet 24 Hr, Take 1 tablet (750 mg total) by mouth 2 (two) times daily with meals. , Disp: 180 tablet, Rfl: 1  •  amLODIPine Besylate 5 MG Oral Tab, Take 1 tablet (5 mg total) by mouth daily. , Disp: 90 tablet, Rfl: 1  •  atorvastatin 10 MG O met w/ this pleasant, talkative, 79 y/o male after his RT today. Pt noted tolerated tx well thus far w/o GI side effects.      Diet hx revealed cereal (shredded wheat or cheerios) for breakfast, +/- sandwich for lunch (he skips lunch 3x/wk), fast foods for

## 2022-01-20 NOTE — PROGRESS NOTES
[facility] Radiation Treatment Management Note 6-10    Patient:  Priya Loyola  Age:  80year old  Visit Diagnosis:    1.  Prostate cancer [facility])      Primary Rad/Onc:  Dr. Agatha Lazo    Site Delivered Dose (cGy) Prescribed Dos

## 2022-01-21 PROCEDURE — 77385 HC IMRT SIMPLE: CPT | Performed by: RADIOLOGY

## 2022-01-21 PROCEDURE — 77336 RADIATION PHYSICS CONSULT: CPT | Performed by: RADIOLOGY

## 2022-01-24 PROCEDURE — 77385 HC IMRT SIMPLE: CPT | Performed by: RADIOLOGY

## 2022-01-25 PROCEDURE — 77385 HC IMRT SIMPLE: CPT | Performed by: RADIOLOGY

## 2022-01-26 PROCEDURE — 77385 HC IMRT SIMPLE: CPT | Performed by: RADIOLOGY

## 2022-01-27 ENCOUNTER — HOSPITAL ENCOUNTER (OUTPATIENT)
Dept: RADIATION ONCOLOGY | Facility: HOSPITAL | Age: 72
Discharge: HOME | End: 2022-01-27
Attending: RADIOLOGY
Payer: MEDICARE

## 2022-01-27 ENCOUNTER — LAB ENCOUNTER (OUTPATIENT)
Dept: LAB | Age: 72
End: 2022-01-27
Attending: FAMILY MEDICINE
Payer: MEDICARE

## 2022-01-27 VITALS
DIASTOLIC BLOOD PRESSURE: 68 MMHG | SYSTOLIC BLOOD PRESSURE: 136 MMHG | TEMPERATURE: 98 F | WEIGHT: 200.81 LBS | BODY MASS INDEX: 33 KG/M2 | HEART RATE: 87 BPM | OXYGEN SATURATION: 98 % | RESPIRATION RATE: 20 BRPM

## 2022-01-27 DIAGNOSIS — E11.9 CONTROLLED TYPE 2 DIABETES MELLITUS WITHOUT COMPLICATION, WITHOUT LONG-TERM CURRENT USE OF INSULIN (HCC): Primary | ICD-10-CM

## 2022-01-27 DIAGNOSIS — C61 PROSTATE CANCER (HCC): Primary | ICD-10-CM

## 2022-01-27 DIAGNOSIS — I10 ESSENTIAL HYPERTENSION WITH GOAL BLOOD PRESSURE LESS THAN 130/80: ICD-10-CM

## 2022-01-27 DIAGNOSIS — E78.2 MIXED HYPERLIPIDEMIA: ICD-10-CM

## 2022-01-27 DIAGNOSIS — D72.829 LEUKOCYTOSIS, UNSPECIFIED TYPE: ICD-10-CM

## 2022-01-27 LAB
ALBUMIN SERPL-MCNC: 3.6 G/DL (ref 3.4–5)
ALBUMIN/GLOB SERPL: 1 {RATIO} (ref 1–2)
ALP LIVER SERPL-CCNC: 126 U/L
ALT SERPL-CCNC: 28 U/L
ANION GAP SERPL CALC-SCNC: 7 MMOL/L (ref 0–18)
AST SERPL-CCNC: 17 U/L (ref 15–37)
BASOPHILS # BLD AUTO: 0.07 X10(3) UL (ref 0–0.2)
BASOPHILS NFR BLD AUTO: 0.7 %
BILIRUB SERPL-MCNC: 0.4 MG/DL (ref 0.1–2)
BUN BLD-MCNC: 13 MG/DL (ref 7–18)
CALCIUM BLD-MCNC: 9.5 MG/DL (ref 8.5–10.1)
CHLORIDE SERPL-SCNC: 101 MMOL/L (ref 98–112)
CHOLEST SERPL-MCNC: 122 MG/DL (ref ?–200)
CO2 SERPL-SCNC: 30 MMOL/L (ref 21–32)
CREAT BLD-MCNC: 0.85 MG/DL
CREAT UR-SCNC: 64.3 MG/DL
EOSINOPHIL # BLD AUTO: 0.57 X10(3) UL (ref 0–0.7)
EOSINOPHIL NFR BLD AUTO: 6 %
ERYTHROCYTE [DISTWIDTH] IN BLOOD BY AUTOMATED COUNT: 13.3 %
EST. AVERAGE GLUCOSE BLD GHB EST-MCNC: 148 MG/DL (ref 68–126)
FASTING PATIENT LIPID ANSWER: YES
FASTING STATUS PATIENT QL REPORTED: YES
GLOBULIN PLAS-MCNC: 3.5 G/DL (ref 2.8–4.4)
HBA1C MFR BLD: 6.8 % (ref ?–5.7)
HCT VFR BLD AUTO: 44.7 %
HDLC SERPL-MCNC: 55 MG/DL (ref 40–59)
HGB BLD-MCNC: 14.9 G/DL
IMM GRANULOCYTES # BLD AUTO: 0.04 X10(3) UL (ref 0–1)
IMM GRANULOCYTES NFR BLD: 0.4 %
LDLC SERPL CALC-MCNC: 50 MG/DL (ref ?–100)
LYMPHOCYTES # BLD AUTO: 1.74 X10(3) UL (ref 1–4)
LYMPHOCYTES NFR BLD AUTO: 18.4 %
MCH RBC QN AUTO: 28.8 PG (ref 26–34)
MCHC RBC AUTO-ENTMCNC: 33.3 G/DL (ref 31–37)
MCV RBC AUTO: 86.3 FL
MICROALBUMIN UR-MCNC: 0.78 MG/DL
MICROALBUMIN/CREAT 24H UR-RTO: 12.1 UG/MG (ref ?–30)
MONOCYTES # BLD AUTO: 0.82 X10(3) UL (ref 0.1–1)
MONOCYTES NFR BLD AUTO: 8.7 %
NEUTROPHILS # BLD AUTO: 6.21 X10 (3) UL (ref 1.5–7.7)
NEUTROPHILS # BLD AUTO: 6.21 X10(3) UL (ref 1.5–7.7)
NEUTROPHILS NFR BLD AUTO: 65.8 %
NONHDLC SERPL-MCNC: 67 MG/DL (ref ?–130)
OSMOLALITY SERPL CALC.SUM OF ELEC: 287 MOSM/KG (ref 275–295)
PLATELET # BLD AUTO: 335 10(3)UL (ref 150–450)
POTASSIUM SERPL-SCNC: 4.6 MMOL/L (ref 3.5–5.1)
PROT SERPL-MCNC: 7.1 G/DL (ref 6.4–8.2)
RBC # BLD AUTO: 5.18 X10(6)UL
SODIUM SERPL-SCNC: 138 MMOL/L (ref 136–145)
TRIGL SERPL-MCNC: 92 MG/DL (ref 30–149)
VLDLC SERPL CALC-MCNC: 13 MG/DL (ref 0–30)
WBC # BLD AUTO: 9.5 X10(3) UL (ref 4–11)

## 2022-01-27 PROCEDURE — 82043 UR ALBUMIN QUANTITATIVE: CPT

## 2022-01-27 PROCEDURE — 85025 COMPLETE CBC W/AUTO DIFF WBC: CPT

## 2022-01-27 PROCEDURE — 80053 COMPREHEN METABOLIC PANEL: CPT

## 2022-01-27 PROCEDURE — 80061 LIPID PANEL: CPT

## 2022-01-27 PROCEDURE — 83036 HEMOGLOBIN GLYCOSYLATED A1C: CPT

## 2022-01-27 PROCEDURE — 82570 ASSAY OF URINE CREATININE: CPT

## 2022-01-27 PROCEDURE — 36415 COLL VENOUS BLD VENIPUNCTURE: CPT

## 2022-01-27 PROCEDURE — 77385 HC IMRT SIMPLE: CPT | Performed by: RADIOLOGY

## 2022-01-27 RX ORDER — TAMSULOSIN HYDROCHLORIDE 0.4 MG/1
CAPSULE ORAL
Qty: 60 CAPSULE | Refills: 0 | Status: SHIPPED | OUTPATIENT
Start: 2022-01-27

## 2022-01-27 NOTE — PROGRESS NOTES
[facility] Radiation Treatment Management Note 11-15    Patient:  Doug Kinsey  Age:  80year old  Visit Diagnosis:    1.  Prostate cancer [facility])      Primary Rad/Onc:  Dr. Matilda Jerome    Site Delivered Dose (cGy) Prescribed Do

## 2022-01-28 PROCEDURE — 77336 RADIATION PHYSICS CONSULT: CPT | Performed by: RADIOLOGY

## 2022-01-28 PROCEDURE — 77385 HC IMRT SIMPLE: CPT | Performed by: RADIOLOGY

## 2022-01-28 NOTE — PROGRESS NOTES
As discussed in the office visit the urine microalbumin creatinine ratio is normal.  Hemoglobin A1c has increased as discussed in the office visit repeat again in 3 months try reducing carbohydrates and exercising. Complete blood count normal white blood cell and red blood cell counts.   Normal lipid panel  Normal kidney function  Elevated alkaline phosphatase

## 2022-01-29 PROBLEM — R74.8 ELEVATED ALKALINE PHOSPHATASE LEVEL: Status: ACTIVE | Noted: 2022-01-29

## 2022-01-29 PROBLEM — B35.1 FUNGAL INFECTION OF TOENAIL: Status: ACTIVE | Noted: 2022-01-29

## 2022-01-29 NOTE — PROGRESS NOTES
HPI:   Victoria Liu is a 80year old male who presents for recheck of his diabetes and ADD.     To discuss labs just done yesterday  Elevated alkaline phosphatase admits to poor lifestyle last month fatty foods and increase carbs  New diagnosis of pr mg daily tolerates medications well no side effects needs a couple of refills     Results for orders placed or performed in visit on 01/27/22   LIPID PANEL   Result Value Ref Range    Cholesterol, Total 122 <200 mg/dL    HDL Cholesterol 55 40 - 59 mg/dL uL    Eosinophil Absolute 0.57 0.00 - 0.70 x10(3) uL    Basophil Absolute 0.07 0.00 - 0.20 x10(3) uL    Immature Granulocyte Absolute 0.04 0.00 - 1.00 x10(3) uL    Neutrophil % 65.8 %    Lymphocyte % 18.4 %    Monocyte % 8.7 %    Eosinophil % 6.0 %    Joni 2/28/2022] Dexmethylphenidate HCl ER (FOCALIN XR) 20 MG Oral Capsule SR 24 Hr Take 1 capsule (20 mg total) by mouth daily.  30 capsule 0   • [START ON 3/31/2022] Dexmethylphenidate HCl ER (FOCALIN XR) 20 MG Oral Capsule SR 24 Hr Take 1 capsule (20 mg total) Tab Take 1 tablet by mouth daily. • Cholecalciferol (VITAMIN D3) 1000 UNITS Oral Cap Take 1 tablet by mouth daily. • Fluticasone Propionate (FLONASE) 50 MCG/ACT Nasal Suspension 2 sprays by Nasal route daily as needed for Allergies.  16 g 5   • Cinn History: Social History    Tobacco Use      Smoking status: Former Smoker        Packs/day: 0.00        Years: 4.00        Pack years: 0        Types: Cigarettes        Start date: 1968        Quit date: 1974        Years since quittin.7 (primary encounter diagnosis)  Controlled type 2 diabetes mellitus without complication, without long-term current use of insulin (hcc)  Elevated alkaline phosphatase level  Fungal infection of toenail  Obesity (bmi 30.0-34. 9)  Essential hypertension with MG Oral Capsule SR 24 Hr; Take 1 capsule (20 mg total) by mouth daily. Dispense: 30 capsule; Refill: 0    2.  Controlled type 2 diabetes mellitus without complication, without long-term current use of insulin ([facility]  Recommendations are: continue present me goal blood pressure less than 130/80  Reviewed medication benefits and side effects. Decrease salt in diet  - losartan 100 MG Oral Tab; Take 1 tablet (100 mg total) by mouth daily. Dispense: 90 tablet; Refill: 1  - hydroCHLOROthiazide 25 MG Oral Tab;  Ta

## 2022-01-31 PROCEDURE — 77385 HC IMRT SIMPLE: CPT | Performed by: RADIOLOGY

## 2022-02-01 ENCOUNTER — HOSPITAL ENCOUNTER (OUTPATIENT)
Dept: RADIATION ONCOLOGY | Facility: HOSPITAL | Age: 72
Discharge: HOME | End: 2022-02-01
Attending: RADIOLOGY
Payer: MEDICARE

## 2022-02-01 PROCEDURE — 77385 HC IMRT SIMPLE: CPT | Performed by: RADIOLOGY

## 2022-02-03 ENCOUNTER — HOSPITAL ENCOUNTER (OUTPATIENT)
Dept: RADIATION ONCOLOGY | Facility: HOSPITAL | Age: 72
Discharge: HOME | End: 2022-02-03
Attending: RADIOLOGY
Payer: MEDICARE

## 2022-02-03 VITALS
DIASTOLIC BLOOD PRESSURE: 71 MMHG | RESPIRATION RATE: 16 BRPM | TEMPERATURE: 98 F | BODY MASS INDEX: 33 KG/M2 | HEART RATE: 94 BPM | OXYGEN SATURATION: 96 % | WEIGHT: 201.38 LBS | SYSTOLIC BLOOD PRESSURE: 121 MMHG

## 2022-02-03 DIAGNOSIS — C61 PROSTATE CANCER (HCC): Primary | ICD-10-CM

## 2022-02-03 PROCEDURE — 77385 HC IMRT SIMPLE: CPT | Performed by: RADIOLOGY

## 2022-02-03 NOTE — PROGRESS NOTES
[facility] Radiation Treatment Management Note 16-20    Patient:  Marium Harvey  Age:  80year old  Visit Diagnosis:    1. Prostate cancer [facility])      Primary Rad/Onc:  Dr. Matt Loera    Site Delivered Dose (cGy) Prescribed Dose (cGy) Fraction #   PROSTATE SIB 4000 7000 16/28     First treatment date:   1/12/22  Concurrent chemotherapy:  N/A    Oncology Vitals 1/27/2022 1/28/2022 2/3/2022   Height - 5' 5.039\" -   Height - 165 cm -   Weight 200 lb 12.8 oz 200 lb 201 lb 6.4 oz   Weight 91.082 kg 90.719 kg 91.354 kg   BSA (m2) 1.98 m2 1.98 m2 1.99 m2   /68 130/60 121/71   Pulse 87 88 94   Resp 20 - 16   Temp 97.7 97.6 97.8   SpO2 98 - 96   Pain Score 0 - 0   Some recent data might be hidden        Toxicities:  Fatigue Grade 1= Fatigue relieved by rest  Constipation Grade 0= None  Diarrhea  Grade 0= None  Dysuria on urination Grade 0= None  Urgency on urination Grade 0= None  Frequency on urinationGrade 0= None  Urine stream strength Moderate  Urine Incontinence Grade 0= None  Nocturia Grade 1= Present    Nursing Note:  Pt feels well, appetite doing well. Has increased frequency, nocturia x2. Intermittent burning sensation felt at start of stream.  Not started Flomax yet. Christelle Gonzales, RN    Physician Note:  Subjective:  Mild LUTS; has not tried the Flomax yet; BMs ok      Objective:  No changes      Treatment setup imaging have been reviewed:   Yes    Assessment/Plan:  cbct shows rectal volume lower than during sim - suggested hold off on BM until post RT  Also try Flomax for LUTS    otv 1 wk    Dr. Matt Loera

## 2022-02-04 PROCEDURE — 77336 RADIATION PHYSICS CONSULT: CPT | Performed by: RADIOLOGY

## 2022-02-04 PROCEDURE — 77385 HC IMRT SIMPLE: CPT | Performed by: RADIOLOGY

## 2022-02-06 RX ORDER — BLOOD-GLUCOSE METER
KIT MISCELLANEOUS
Qty: 100 STRIP | Refills: 3 | Status: SHIPPED | OUTPATIENT
Start: 2022-02-06

## 2022-02-07 PROCEDURE — 77385 HC IMRT SIMPLE: CPT | Performed by: RADIOLOGY

## 2022-02-07 NOTE — TELEPHONE ENCOUNTER
Refill Passed Protocol:     Pt requesting refill of TEST STRIPS  Refill was approved and sent to pharmacy:     Last Office Visit with Provider: 1/28/22    Appt. scheduled on 5/2/22

## 2022-02-08 PROCEDURE — 77385 HC IMRT SIMPLE: CPT | Performed by: RADIOLOGY

## 2022-02-09 ENCOUNTER — TELEPHONE (OUTPATIENT)
Dept: FAMILY MEDICINE CLINIC | Facility: CLINIC | Age: 72
End: 2022-02-09

## 2022-02-09 PROCEDURE — 77385 HC IMRT SIMPLE: CPT | Performed by: RADIOLOGY

## 2022-02-09 NOTE — TELEPHONE ENCOUNTER
Received fax diabetic detailed written order form requesting lancets. Form filled out and faxed and sent to scan.

## 2022-02-10 ENCOUNTER — HOSPITAL ENCOUNTER (OUTPATIENT)
Dept: RADIATION ONCOLOGY | Facility: HOSPITAL | Age: 72
Discharge: HOME | End: 2022-02-10
Attending: RADIOLOGY
Payer: MEDICARE

## 2022-02-10 VITALS
OXYGEN SATURATION: 96 % | TEMPERATURE: 97 F | SYSTOLIC BLOOD PRESSURE: 116 MMHG | WEIGHT: 200.19 LBS | DIASTOLIC BLOOD PRESSURE: 60 MMHG | BODY MASS INDEX: 33 KG/M2 | HEART RATE: 91 BPM | RESPIRATION RATE: 20 BRPM

## 2022-02-10 DIAGNOSIS — C61 PROSTATE CANCER (HCC): Primary | ICD-10-CM

## 2022-02-10 PROCEDURE — 77385 HC IMRT SIMPLE: CPT | Performed by: RADIOLOGY

## 2022-02-10 RX ORDER — BUPROPION HYDROCHLORIDE 300 MG/1
TABLET ORAL
Qty: 90 TABLET | Refills: 0 | Status: SHIPPED | OUTPATIENT
Start: 2022-02-10

## 2022-02-10 RX ORDER — METFORMIN HYDROCHLORIDE 750 MG/1
TABLET, EXTENDED RELEASE ORAL
Qty: 180 TABLET | Refills: 1 | Status: SHIPPED | OUTPATIENT
Start: 2022-02-10

## 2022-02-10 NOTE — TELEPHONE ENCOUNTER
Refill protocol passed because the patient met the following protocol for    Metformin 750mg BID     Diabetic Medication Protocol Passed 02/10/2022 06:10 AM   Protocol Details  HgBA1C procedure resulted in past 6 months    Last HgBA1C < 7.5    Microalbumin procedure in past 12 months or taking ACE/ARB    Appointment in past 6 or next 3 months

## 2022-02-10 NOTE — PROGRESS NOTES
[facility] Radiation Treatment Management Note 21-25    Patient:  Rufina Soulier  Age:  80year old  Visit Diagnosis:    1. Prostate cancer [facility])      Primary Rad/Onc:  Dr. Rosario Almeida    Site Delivered Dose (cGy) Prescribed Dose (cGy) Fraction #   PROSTATE SIB [id number] 7000 21/28     First treatment date:   1/12/22  Concurrent chemotherapy:  N/A    Oncology Vitals 1/28/2022 2/3/2022 2/10/2022   Height 5' 5.039\" - -   Height 165 cm - -   Weight 200 lb 201 lb 6.4 oz 200 lb 3.2 oz   Weight 90.719 kg 91.354 kg 90.81 kg   BSA (m2) 1.98 m2 1.99 m2 1.98 m2   /60 121/71 116/60   Pulse 88 94 91   Resp - 16 20   Temp 97.6 97.8 97.3   SpO2 - 96 96   Pain Score - 0 0   Some recent data might be hidden        Toxicities:  Fatigue Grade 0= None  Constipation Grade 0= None  Diarrhea  Grade 0= None  Dysuria on urination Grade 0= None  Urgency on urination Grade 0= None  Frequency on urinationGrade 0= None  Urine stream strength Moderate  Urine Incontinence Grade 0= None  Nocturia Grade 1= Present    Nursing Note:  Pt feels well, appetite good. Denies dysuria, hematuria. Nocturia x 1. BM regular. Jeramy Crews, RN    Physician Note:  Subjective:  flomax helping  No GI issues        Objective:  No changes      Treatment setup imaging have been reviewed:   Yes    Assessment/Plan:    Cont Flomax    Continue radiotherapy per plan    Next visit:  1 week    Dr. Rosario Almeida

## 2022-02-11 PROCEDURE — 77385 HC IMRT SIMPLE: CPT | Performed by: RADIOLOGY

## 2022-02-11 PROCEDURE — 77336 RADIATION PHYSICS CONSULT: CPT | Performed by: RADIOLOGY

## 2022-02-12 ENCOUNTER — TELEPHONE (OUTPATIENT)
Dept: FAMILY MEDICINE CLINIC | Facility: CLINIC | Age: 72
End: 2022-02-12

## 2022-02-12 NOTE — TELEPHONE ENCOUNTER
The patient's completed \"Diabetic Detailed Written Order\" for Glucose Test Strips and Lancets was successfully faxed to [facility] at [phone].

## 2022-02-14 PROCEDURE — 77385 HC IMRT SIMPLE: CPT | Performed by: RADIOLOGY

## 2022-02-15 PROCEDURE — 77385 HC IMRT SIMPLE: CPT | Performed by: RADIOLOGY

## 2022-02-16 PROCEDURE — 77385 HC IMRT SIMPLE: CPT | Performed by: RADIOLOGY

## 2022-02-17 ENCOUNTER — HOSPITAL ENCOUNTER (OUTPATIENT)
Dept: RADIATION ONCOLOGY | Facility: HOSPITAL | Age: 72
Discharge: HOME | End: 2022-02-17
Attending: RADIOLOGY
Payer: MEDICARE

## 2022-02-17 VITALS
WEIGHT: 203 LBS | DIASTOLIC BLOOD PRESSURE: 71 MMHG | RESPIRATION RATE: 20 BRPM | OXYGEN SATURATION: 96 % | BODY MASS INDEX: 34 KG/M2 | HEART RATE: 83 BPM | TEMPERATURE: 98 F | SYSTOLIC BLOOD PRESSURE: 127 MMHG

## 2022-02-17 DIAGNOSIS — C61 PROSTATE CANCER (HCC): Primary | ICD-10-CM

## 2022-02-17 PROCEDURE — 77385 HC IMRT SIMPLE: CPT | Performed by: RADIOLOGY

## 2022-02-17 NOTE — PATIENT INSTRUCTIONS
- WE WILL CALL TO SCHEDULE YOU FOR A FOLLOW-UP WITH DR. MILLER IN JUNE 2022  - SIDE EFFECTS OF RADIATION WILL GRADUALLY SUBSIDE. IT MAY TAKE 1- 2 WEEKS POST-RADIATION FOR YOU TO NOTICE CHANGES SUCH AS A DECREASE IN YOUR FATIGUE LEVEL, DECREASE IN URINARY SYMPTOMS (FREQUENCY, BURNING SENSATION), DECREASE IN BOWEL SYMPTOMS (DIARRHEA, RECTAL IRRITATION). - CONTINUE TAKING YOUR MEDICATIONS. - CALL THE NURSE LINE AT [phone] IF YOU HAVE ANY QUESTIONS/CONCERNS REGARDING RADIATION THERAPY.

## 2022-02-18 PROCEDURE — 77336 RADIATION PHYSICS CONSULT: CPT | Performed by: RADIOLOGY

## 2022-02-18 PROCEDURE — 77385 HC IMRT SIMPLE: CPT | Performed by: RADIOLOGY

## 2022-02-21 ENCOUNTER — DOCUMENTATION ONLY (OUTPATIENT)
Dept: RADIATION ONCOLOGY | Facility: HOSPITAL | Age: 72
End: 2022-02-21

## 2022-02-21 PROCEDURE — 77385 HC IMRT SIMPLE: CPT | Performed by: RADIOLOGY

## 2022-02-23 NOTE — PROGRESS NOTES
[facility] RADIATION ONCOLOGY  TREATMENT SUMMARY     PATIENT:  Oris Loki MD: Mandy Cueva. Horacio Khan MD  DIAGNOSIS:  Prostate CA    HISTORY   80year-old with PSA 5.1. Biopsy showed a [place] group 2 adenocarcinoma of the prostate. 4+ cores total.  Gland volume is 47 g. Mild baseline urinary incontinence, no pads. T1c. NCCN favorable intermediate risk. Decided to pursue EBRT alone. DOSE DELIVERED     Concurrent systemic Rx  no  IGRT    CBCT    CLINICAL COURSE   He had some mild LUTS for which Flomax was given with good effect. No lower GI issues. Overall tolerated radiation very well. PLAN   Follow-up urology in March  Follow-up here in June    Kellee Almaraz M.D. Radiation Oncology    CC: HARPREET Khan MD

## 2022-03-30 ENCOUNTER — OFFICE VISIT (OUTPATIENT)
Dept: SURGERY | Facility: CLINIC | Age: 72
End: 2022-03-30
Payer: MEDICARE

## 2022-03-30 ENCOUNTER — LAB ENCOUNTER (OUTPATIENT)
Dept: LAB | Age: 72
End: 2022-03-30
Attending: UROLOGY
Payer: MEDICARE

## 2022-03-30 DIAGNOSIS — N13.8 BPH WITH OBSTRUCTION/LOWER URINARY TRACT SYMPTOMS: ICD-10-CM

## 2022-03-30 DIAGNOSIS — N40.1 BPH WITH OBSTRUCTION/LOWER URINARY TRACT SYMPTOMS: ICD-10-CM

## 2022-03-30 DIAGNOSIS — N52.9 ERECTILE DYSFUNCTION, UNSPECIFIED ERECTILE DYSFUNCTION TYPE: ICD-10-CM

## 2022-03-30 DIAGNOSIS — R39.12 WEAK URINARY STREAM: ICD-10-CM

## 2022-03-30 DIAGNOSIS — C61 PROSTATE CANCER (HCC): Primary | ICD-10-CM

## 2022-03-30 DIAGNOSIS — R97.20 ELEVATED PSA: ICD-10-CM

## 2022-03-30 DIAGNOSIS — C61 PROSTATE CANCER (HCC): ICD-10-CM

## 2022-03-30 LAB
APPEARANCE: CLEAR
BILIRUBIN: NEGATIVE
GLUCOSE (URINE DIPSTICK): NEGATIVE MG/DL
KETONES (URINE DIPSTICK): NEGATIVE MG/DL
LEUKOCYTES: NEGATIVE
MULTISTIX LOT#: NORMAL NUMERIC
OCCULT BLOOD: NEGATIVE
PH, URINE: 5 (ref 4.5–8)
PROTEIN (URINE DIPSTICK): NEGATIVE MG/DL
PSA SERPL-MCNC: 1.21 NG/ML (ref ?–4)
SPECIFIC GRAVITY: 1.02 (ref 1–1.03)
URINE-COLOR: YELLOW
UROBILINOGEN,SEMI-QN: 0.2 MG/DL (ref 0–1.9)

## 2022-03-30 PROCEDURE — 36415 COLL VENOUS BLD VENIPUNCTURE: CPT

## 2022-03-30 PROCEDURE — 81003 URINALYSIS AUTO W/O SCOPE: CPT | Performed by: UROLOGY

## 2022-03-30 PROCEDURE — 84153 ASSAY OF PSA TOTAL: CPT

## 2022-03-30 PROCEDURE — 99214 OFFICE O/P EST MOD 30 MIN: CPT | Performed by: UROLOGY

## 2022-03-30 RX ORDER — TAMSULOSIN HYDROCHLORIDE 0.4 MG/1
0.4 CAPSULE ORAL EVERY EVENING
Qty: 90 CAPSULE | Refills: 6 | Status: SHIPPED | OUTPATIENT
Start: 2022-03-30

## 2022-04-04 DIAGNOSIS — E03.8 HYPOTHYROIDISM DUE TO HASHIMOTO'S THYROIDITIS: ICD-10-CM

## 2022-04-04 DIAGNOSIS — E06.3 HYPOTHYROIDISM DUE TO HASHIMOTO'S THYROIDITIS: ICD-10-CM

## 2022-04-04 RX ORDER — LEVOTHYROXINE SODIUM 0.05 MG/1
TABLET ORAL
Qty: 90 TABLET | Refills: 0 | Status: SHIPPED | OUTPATIENT
Start: 2022-04-04 | End: 2022-07-22

## 2022-04-04 NOTE — TELEPHONE ENCOUNTER
Pt requesting refill of LEVOTHYROXINE 0.05MG (50MCG) TAB    Last Time Medication was Prescribed :  01/11/2022 qty # 80    Last Office Visit with Provider: 01/28/2022    Recommended to return by Provider: The patient is asked to return in 3 months.     Appt scheduled on 05/02/2022      Passed protocol, refill approved, sent to pharmacy   Thyroid Supplements Protocol Passed 04/04/2022 09:15 AM   Protocol Details  TSH test in past 12 months    TSH value between 0.350 and 5.500 IU/ml    Appointment in past 12 or next 3 months

## 2022-04-26 RX ORDER — BUPROPION HYDROCHLORIDE 300 MG/1
TABLET ORAL
Qty: 90 TABLET | Refills: 0 | Status: SHIPPED | OUTPATIENT
Start: 2022-04-26

## 2022-04-26 RX ORDER — LEVOTHYROXINE SODIUM 0.05 MG/1
TABLET ORAL
Qty: 90 TABLET | Refills: 0 | OUTPATIENT
Start: 2022-04-26

## 2022-04-29 ENCOUNTER — LAB ENCOUNTER (OUTPATIENT)
Dept: LAB | Age: 72
End: 2022-04-29
Attending: UROLOGY
Payer: MEDICARE

## 2022-04-29 DIAGNOSIS — R74.8 ELEVATED ALKALINE PHOSPHATASE LEVEL: ICD-10-CM

## 2022-04-29 DIAGNOSIS — R53.82 CHRONIC FATIGUE: ICD-10-CM

## 2022-04-29 DIAGNOSIS — E03.8 HYPOTHYROIDISM DUE TO HASHIMOTO'S THYROIDITIS: ICD-10-CM

## 2022-04-29 DIAGNOSIS — E11.9 CONTROLLED TYPE 2 DIABETES MELLITUS WITHOUT COMPLICATION, WITHOUT LONG-TERM CURRENT USE OF INSULIN (HCC): ICD-10-CM

## 2022-04-29 DIAGNOSIS — E06.3 HYPOTHYROIDISM DUE TO HASHIMOTO'S THYROIDITIS: ICD-10-CM

## 2022-04-29 LAB
ALBUMIN SERPL-MCNC: 3.9 G/DL (ref 3.4–5)
ALP LIVER SERPL-CCNC: 103 U/L
ALT SERPL-CCNC: 35 U/L
AST SERPL-CCNC: 20 U/L (ref 15–37)
BILIRUB DIRECT SERPL-MCNC: 0.2 MG/DL (ref 0–0.2)
BILIRUB SERPL-MCNC: 0.5 MG/DL (ref 0.1–2)
EST. AVERAGE GLUCOSE BLD GHB EST-MCNC: 148 MG/DL (ref 68–126)
HBA1C MFR BLD: 6.8 % (ref ?–5.7)
PROT SERPL-MCNC: 7.4 G/DL (ref 6.4–8.2)

## 2022-04-29 PROCEDURE — 84075 ASSAY ALKALINE PHOSPHATASE: CPT

## 2022-04-29 PROCEDURE — 84439 ASSAY OF FREE THYROXINE: CPT

## 2022-04-29 PROCEDURE — 83036 HEMOGLOBIN GLYCOSYLATED A1C: CPT

## 2022-04-29 PROCEDURE — 84080 ASSAY ALKALINE PHOSPHATASES: CPT

## 2022-04-29 PROCEDURE — 36415 COLL VENOUS BLD VENIPUNCTURE: CPT

## 2022-04-29 PROCEDURE — 80076 HEPATIC FUNCTION PANEL: CPT

## 2022-04-29 PROCEDURE — 84443 ASSAY THYROID STIM HORMONE: CPT

## 2022-04-30 NOTE — PROGRESS NOTES
Liver function tests are now normal.  Hemoglobin A1c is stable at 6.8 would like to see it lower we will discuss at office visit.

## 2022-05-02 ENCOUNTER — OFFICE VISIT (OUTPATIENT)
Dept: FAMILY MEDICINE CLINIC | Facility: CLINIC | Age: 72
End: 2022-05-02
Payer: MEDICARE

## 2022-05-02 VITALS
BODY MASS INDEX: 34.49 KG/M2 | HEIGHT: 65.04 IN | WEIGHT: 207 LBS | TEMPERATURE: 97 F | HEART RATE: 92 BPM | SYSTOLIC BLOOD PRESSURE: 110 MMHG | DIASTOLIC BLOOD PRESSURE: 60 MMHG

## 2022-05-02 DIAGNOSIS — F90.0 ATTENTION DEFICIT HYPERACTIVITY DISORDER (ADHD), PREDOMINANTLY INATTENTIVE TYPE: ICD-10-CM

## 2022-05-02 DIAGNOSIS — E11.9 CONTROLLED TYPE 2 DIABETES MELLITUS WITHOUT COMPLICATION, WITHOUT LONG-TERM CURRENT USE OF INSULIN (HCC): Primary | ICD-10-CM

## 2022-05-02 DIAGNOSIS — Z99.89 OSA ON CPAP: ICD-10-CM

## 2022-05-02 DIAGNOSIS — R53.82 CHRONIC FATIGUE: ICD-10-CM

## 2022-05-02 DIAGNOSIS — E66.9 OBESITY (BMI 30.0-34.9): ICD-10-CM

## 2022-05-02 DIAGNOSIS — E03.8 HYPOTHYROIDISM DUE TO HASHIMOTO'S THYROIDITIS: ICD-10-CM

## 2022-05-02 DIAGNOSIS — G47.33 OSA ON CPAP: ICD-10-CM

## 2022-05-02 DIAGNOSIS — Z96.641 HISTORY OF RIGHT HIP REPLACEMENT: ICD-10-CM

## 2022-05-02 DIAGNOSIS — I10 ESSENTIAL HYPERTENSION WITH GOAL BLOOD PRESSURE LESS THAN 130/80: ICD-10-CM

## 2022-05-02 DIAGNOSIS — Z79.899 MEDICATION MANAGEMENT: ICD-10-CM

## 2022-05-02 DIAGNOSIS — E06.3 HYPOTHYROIDISM DUE TO HASHIMOTO'S THYROIDITIS: ICD-10-CM

## 2022-05-02 DIAGNOSIS — C61 PROSTATE CANCER (HCC): ICD-10-CM

## 2022-05-02 DIAGNOSIS — S76.011A STRAIN OF RIGHT HIP, INITIAL ENCOUNTER: ICD-10-CM

## 2022-05-02 LAB
T4 FREE SERPL-MCNC: 1 NG/DL (ref 0.8–1.7)
TSI SER-ACNC: 3.09 MIU/ML (ref 0.36–3.74)

## 2022-05-02 PROCEDURE — 99215 OFFICE O/P EST HI 40 MIN: CPT | Performed by: FAMILY MEDICINE

## 2022-05-02 RX ORDER — AMLODIPINE BESYLATE 5 MG/1
5 TABLET ORAL DAILY
Qty: 90 TABLET | Refills: 1 | Status: SHIPPED | OUTPATIENT
Start: 2022-05-02

## 2022-05-02 RX ORDER — SEMAGLUTIDE 0.25 MG/.5ML
0.25 INJECTION, SOLUTION SUBCUTANEOUS WEEKLY
Qty: 2 ML | Refills: 0 | Status: SHIPPED | OUTPATIENT
Start: 2022-05-02 | End: 2022-05-24

## 2022-05-02 RX ORDER — DEXMETHYLPHENIDATE HYDROCHLORIDE 20 MG/1
20 CAPSULE, EXTENDED RELEASE ORAL DAILY
Qty: 30 CAPSULE | Refills: 0 | Status: SHIPPED | OUTPATIENT
Start: 2022-05-02 | End: 2022-06-01

## 2022-05-02 RX ORDER — DEXMETHYLPHENIDATE HYDROCHLORIDE 20 MG/1
20 CAPSULE, EXTENDED RELEASE ORAL DAILY
Qty: 30 CAPSULE | Refills: 0 | Status: SHIPPED | OUTPATIENT
Start: 2022-07-03 | End: 2022-08-02

## 2022-05-02 RX ORDER — CYCLOBENZAPRINE HCL 5 MG
5 TABLET ORAL NIGHTLY
Qty: 15 TABLET | Refills: 0 | Status: SHIPPED | OUTPATIENT
Start: 2022-05-02

## 2022-05-02 RX ORDER — DEXMETHYLPHENIDATE HYDROCHLORIDE 20 MG/1
20 CAPSULE, EXTENDED RELEASE ORAL DAILY
Qty: 30 CAPSULE | Refills: 0 | Status: SHIPPED | OUTPATIENT
Start: 2022-06-02 | End: 2022-07-02

## 2022-05-03 PROBLEM — Z79.899 MEDICATION MANAGEMENT: Status: ACTIVE | Noted: 2022-05-03

## 2022-05-03 PROBLEM — S76.011A STRAIN OF RIGHT HIP: Status: ACTIVE | Noted: 2022-05-03

## 2022-05-03 LAB
ALK-PHOSPHATASE BONE CALC: 41 U/L
ALK-PHOSPHATASE LIVER CALC: 71 U/L
ALK-PHOSPHATASE OTHER CALC: 0 U/L
ALKALINE PHOSPHATASE: 112 U/L

## 2022-05-04 ENCOUNTER — TELEPHONE (OUTPATIENT)
Dept: FAMILY MEDICINE CLINIC | Facility: CLINIC | Age: 72
End: 2022-05-04

## 2022-06-15 NOTE — TELEPHONE ENCOUNTER
Rxs approved qty 90 days NR per protocol  Diclofenac and bupropion approved qty 90 NR Finasteride Male Counseling: Finasteride Counseling:  I discussed with the patient the risks of use of finasteride including but not limited to decreased libido, decreased ejaculate volume, gynecomastia, and depression. Women should not handle medication.  All of the patient's questions and concerns were addressed. Finasteride Counseling:  I discussed with the patient the risks of use of finasteride including but not limited to decreased libido, decreased ejaculate volume, gynecomastia, and depression. Women should not handle medication.  All of the patient's questions and concerns were addressed.

## 2022-07-20 ENCOUNTER — LAB ENCOUNTER (OUTPATIENT)
Dept: LAB | Age: 72
End: 2022-07-20
Attending: FAMILY MEDICINE
Payer: MEDICARE

## 2022-07-20 DIAGNOSIS — I10 ESSENTIAL HYPERTENSION WITH GOAL BLOOD PRESSURE LESS THAN 130/80: ICD-10-CM

## 2022-07-20 DIAGNOSIS — E11.9 CONTROLLED TYPE 2 DIABETES MELLITUS WITHOUT COMPLICATION, WITHOUT LONG-TERM CURRENT USE OF INSULIN (HCC): ICD-10-CM

## 2022-07-20 LAB
ALBUMIN SERPL-MCNC: 3.6 G/DL (ref 3.4–5)
ALBUMIN/GLOB SERPL: 0.9 {RATIO} (ref 1–2)
ALP LIVER SERPL-CCNC: 107 U/L
ALT SERPL-CCNC: 27 U/L
ANION GAP SERPL CALC-SCNC: 8 MMOL/L (ref 0–18)
AST SERPL-CCNC: 16 U/L (ref 15–37)
BILIRUB SERPL-MCNC: 0.4 MG/DL (ref 0.1–2)
BUN BLD-MCNC: 16 MG/DL (ref 7–18)
CALCIUM BLD-MCNC: 9.7 MG/DL (ref 8.5–10.1)
CHLORIDE SERPL-SCNC: 102 MMOL/L (ref 98–112)
CO2 SERPL-SCNC: 28 MMOL/L (ref 21–32)
CREAT BLD-MCNC: 0.98 MG/DL
EST. AVERAGE GLUCOSE BLD GHB EST-MCNC: 160 MG/DL (ref 68–126)
FASTING STATUS PATIENT QL REPORTED: YES
GLOBULIN PLAS-MCNC: 3.9 G/DL (ref 2.8–4.4)
GLUCOSE BLD-MCNC: 135 MG/DL (ref 70–99)
HBA1C MFR BLD: 7.2 % (ref ?–5.7)
OSMOLALITY SERPL CALC.SUM OF ELEC: 289 MOSM/KG (ref 275–295)
POTASSIUM SERPL-SCNC: 3.9 MMOL/L (ref 3.5–5.1)
PROT SERPL-MCNC: 7.5 G/DL (ref 6.4–8.2)
SODIUM SERPL-SCNC: 138 MMOL/L (ref 136–145)

## 2022-07-20 PROCEDURE — 80053 COMPREHEN METABOLIC PANEL: CPT

## 2022-07-20 PROCEDURE — 36415 COLL VENOUS BLD VENIPUNCTURE: CPT

## 2022-07-20 PROCEDURE — 83036 HEMOGLOBIN GLYCOSYLATED A1C: CPT

## 2022-07-22 ENCOUNTER — OFFICE VISIT (OUTPATIENT)
Dept: FAMILY MEDICINE CLINIC | Facility: CLINIC | Age: 72
End: 2022-07-22
Payer: MEDICARE

## 2022-07-22 VITALS
OXYGEN SATURATION: 98 % | SYSTOLIC BLOOD PRESSURE: 132 MMHG | BODY MASS INDEX: 34.32 KG/M2 | HEIGHT: 65.04 IN | WEIGHT: 206 LBS | HEART RATE: 84 BPM | TEMPERATURE: 97 F | DIASTOLIC BLOOD PRESSURE: 68 MMHG

## 2022-07-22 DIAGNOSIS — C61 PROSTATE CANCER (HCC): ICD-10-CM

## 2022-07-22 DIAGNOSIS — E66.9 OBESITY (BMI 30.0-34.9): ICD-10-CM

## 2022-07-22 DIAGNOSIS — E03.8 HYPOTHYROIDISM DUE TO HASHIMOTO'S THYROIDITIS: ICD-10-CM

## 2022-07-22 DIAGNOSIS — G47.33 OSA ON CPAP: ICD-10-CM

## 2022-07-22 DIAGNOSIS — E06.3 HYPOTHYROIDISM DUE TO HASHIMOTO'S THYROIDITIS: ICD-10-CM

## 2022-07-22 DIAGNOSIS — Z99.89 OSA ON CPAP: ICD-10-CM

## 2022-07-22 DIAGNOSIS — Z96.641 HISTORY OF RIGHT HIP REPLACEMENT: ICD-10-CM

## 2022-07-22 DIAGNOSIS — M15.9 PRIMARY OSTEOARTHRITIS INVOLVING MULTIPLE JOINTS: ICD-10-CM

## 2022-07-22 DIAGNOSIS — Z97.4 DOES USE HEARING AID: ICD-10-CM

## 2022-07-22 DIAGNOSIS — Z79.899 MEDICATION MANAGEMENT: ICD-10-CM

## 2022-07-22 DIAGNOSIS — F41.1 GAD (GENERALIZED ANXIETY DISORDER): ICD-10-CM

## 2022-07-22 DIAGNOSIS — I83.892 VARICOSE VEINS OF LEFT LEG WITH EDEMA: ICD-10-CM

## 2022-07-22 DIAGNOSIS — B35.1 FUNGAL INFECTION OF TOENAIL: ICD-10-CM

## 2022-07-22 DIAGNOSIS — E78.2 MIXED HYPERLIPIDEMIA: ICD-10-CM

## 2022-07-22 DIAGNOSIS — Z00.00 ENCOUNTER FOR ANNUAL HEALTH EXAMINATION: Primary | ICD-10-CM

## 2022-07-22 DIAGNOSIS — F32.5 DEPRESSION, MAJOR, IN REMISSION (HCC): ICD-10-CM

## 2022-07-22 DIAGNOSIS — I10 ESSENTIAL HYPERTENSION WITH GOAL BLOOD PRESSURE LESS THAN 130/80: ICD-10-CM

## 2022-07-22 DIAGNOSIS — F90.0 ATTENTION DEFICIT HYPERACTIVITY DISORDER (ADHD), PREDOMINANTLY INATTENTIVE TYPE: ICD-10-CM

## 2022-07-22 DIAGNOSIS — E11.65 UNCONTROLLED TYPE 2 DIABETES MELLITUS WITH HYPERGLYCEMIA (HCC): ICD-10-CM

## 2022-07-22 PROBLEM — F41.9 MILD ANXIETY: Status: RESOLVED | Noted: 2017-07-10 | Resolved: 2022-07-22

## 2022-07-22 PROBLEM — R74.8 ELEVATED ALKALINE PHOSPHATASE LEVEL: Status: RESOLVED | Noted: 2022-01-29 | Resolved: 2022-07-22

## 2022-07-22 PROBLEM — S76.011A STRAIN OF RIGHT HIP: Status: RESOLVED | Noted: 2022-05-03 | Resolved: 2022-07-22

## 2022-07-22 PROBLEM — M21.42 FLAT FEET, BILATERAL: Status: RESOLVED | Noted: 2020-03-03 | Resolved: 2022-07-22

## 2022-07-22 PROBLEM — M21.41 FLAT FEET, BILATERAL: Status: RESOLVED | Noted: 2020-03-03 | Resolved: 2022-07-22

## 2022-07-22 PROCEDURE — G0439 PPPS, SUBSEQ VISIT: HCPCS | Performed by: FAMILY MEDICINE

## 2022-07-22 PROCEDURE — 99214 OFFICE O/P EST MOD 30 MIN: CPT | Performed by: FAMILY MEDICINE

## 2022-07-22 PROCEDURE — 1125F AMNT PAIN NOTED PAIN PRSNT: CPT | Performed by: FAMILY MEDICINE

## 2022-07-22 RX ORDER — ATORVASTATIN CALCIUM 10 MG/1
10 TABLET, FILM COATED ORAL DAILY
Qty: 90 TABLET | Refills: 3 | Status: SHIPPED | OUTPATIENT
Start: 2022-07-22

## 2022-07-22 RX ORDER — LOSARTAN POTASSIUM 100 MG/1
100 TABLET ORAL DAILY
Qty: 90 TABLET | Refills: 1 | Status: SHIPPED | OUTPATIENT
Start: 2022-07-22

## 2022-07-22 RX ORDER — BUPROPION HYDROCHLORIDE 300 MG/1
300 TABLET ORAL DAILY
Qty: 90 TABLET | Refills: 1 | Status: SHIPPED | OUTPATIENT
Start: 2022-07-22

## 2022-07-22 RX ORDER — LEVOTHYROXINE SODIUM 0.05 MG/1
50 TABLET ORAL
Qty: 90 TABLET | Refills: 2 | Status: SHIPPED | OUTPATIENT
Start: 2022-07-22

## 2022-07-22 RX ORDER — DEXMETHYLPHENIDATE HYDROCHLORIDE 20 MG/1
20 CAPSULE, EXTENDED RELEASE ORAL DAILY
Qty: 30 CAPSULE | Refills: 0 | Status: SHIPPED | OUTPATIENT
Start: 2022-09-14 | End: 2022-10-14

## 2022-07-22 RX ORDER — DEXMETHYLPHENIDATE HYDROCHLORIDE 20 MG/1
20 CAPSULE, EXTENDED RELEASE ORAL DAILY
Qty: 30 CAPSULE | Refills: 0 | Status: SHIPPED | OUTPATIENT
Start: 2022-10-14 | End: 2022-11-13

## 2022-07-22 RX ORDER — ORAL SEMAGLUTIDE 3 MG/1
3 TABLET ORAL DAILY
Qty: 30 TABLET | Refills: 2 | Status: SHIPPED | OUTPATIENT
Start: 2022-07-22 | End: 2022-08-21

## 2022-07-22 RX ORDER — DEXMETHYLPHENIDATE HYDROCHLORIDE 20 MG/1
20 CAPSULE, EXTENDED RELEASE ORAL DAILY
Qty: 30 CAPSULE | Refills: 0 | Status: SHIPPED | OUTPATIENT
Start: 2022-08-15 | End: 2022-09-14

## 2022-07-22 RX ORDER — HYDROCHLOROTHIAZIDE 25 MG/1
25 TABLET ORAL EVERY MORNING
Qty: 90 TABLET | Refills: 1 | Status: SHIPPED | OUTPATIENT
Start: 2022-07-22

## 2022-07-22 RX ORDER — METFORMIN HYDROCHLORIDE 750 MG/1
750 TABLET, EXTENDED RELEASE ORAL 2 TIMES DAILY WITH MEALS
Qty: 180 TABLET | Refills: 1 | Status: SHIPPED | OUTPATIENT
Start: 2022-07-22

## 2022-07-23 DIAGNOSIS — F41.1 GAD (GENERALIZED ANXIETY DISORDER): ICD-10-CM

## 2022-07-23 DIAGNOSIS — I10 ESSENTIAL HYPERTENSION WITH GOAL BLOOD PRESSURE LESS THAN 130/80: ICD-10-CM

## 2022-07-23 DIAGNOSIS — E11.65 UNCONTROLLED TYPE 2 DIABETES MELLITUS WITH HYPERGLYCEMIA (HCC): ICD-10-CM

## 2022-07-25 RX ORDER — HYDROCHLOROTHIAZIDE 25 MG/1
TABLET ORAL
Qty: 90 TABLET | Refills: 1 | OUTPATIENT
Start: 2022-07-25

## 2022-07-25 RX ORDER — LOSARTAN POTASSIUM 100 MG/1
TABLET ORAL
Qty: 90 TABLET | Refills: 1 | OUTPATIENT
Start: 2022-07-25

## 2022-07-25 RX ORDER — METFORMIN HYDROCHLORIDE 750 MG/1
TABLET, EXTENDED RELEASE ORAL
Qty: 180 TABLET | Refills: 1 | OUTPATIENT
Start: 2022-07-25

## 2022-07-25 RX ORDER — BUPROPION HYDROCHLORIDE 300 MG/1
TABLET ORAL
Qty: 90 TABLET | Refills: 1 | OUTPATIENT
Start: 2022-07-25

## 2022-07-27 ENCOUNTER — TELEPHONE (OUTPATIENT)
Facility: LOCATION | Age: 72
End: 2022-07-27

## 2022-07-27 ENCOUNTER — OFFICE VISIT (OUTPATIENT)
Facility: LOCATION | Age: 72
End: 2022-07-27
Payer: MEDICARE

## 2022-07-27 VITALS
HEART RATE: 87 BPM | RESPIRATION RATE: 16 BRPM | TEMPERATURE: 99 F | SYSTOLIC BLOOD PRESSURE: 124 MMHG | WEIGHT: 206 LBS | BODY MASS INDEX: 34 KG/M2 | DIASTOLIC BLOOD PRESSURE: 66 MMHG

## 2022-07-27 DIAGNOSIS — H60.502 ACUTE OTITIS EXTERNA OF LEFT EAR, UNSPECIFIED TYPE: ICD-10-CM

## 2022-07-27 DIAGNOSIS — H61.23 BILATERAL IMPACTED CERUMEN: Primary | ICD-10-CM

## 2022-07-27 PROCEDURE — 99213 OFFICE O/P EST LOW 20 MIN: CPT | Performed by: OTOLARYNGOLOGY

## 2022-07-27 PROCEDURE — 92504 EAR MICROSCOPY EXAMINATION: CPT | Performed by: OTOLARYNGOLOGY

## 2022-07-27 RX ORDER — HYDROCORTISONE AND ACETIC ACID 20.75; 10.375 MG/ML; MG/ML
5 SOLUTION AURICULAR (OTIC) 2 TIMES DAILY
Qty: 10 ML | Refills: 0 | Status: SHIPPED | OUTPATIENT
Start: 2022-07-27

## 2022-07-29 ENCOUNTER — TELEPHONE (OUTPATIENT)
Dept: FAMILY MEDICINE CLINIC | Facility: CLINIC | Age: 72
End: 2022-07-29

## 2022-07-29 NOTE — TELEPHONE ENCOUNTER
Received a fax from the pharmacy. States Rybelsus not covered. OPTUMRX, , states he must try and fail Trulicity or Bydureon. Please advise.

## 2022-08-02 ENCOUNTER — APPOINTMENT (OUTPATIENT)
Dept: CT IMAGING | Age: 72
End: 2022-08-02
Attending: PHYSICIAN ASSISTANT
Payer: MEDICARE

## 2022-08-02 ENCOUNTER — HOSPITAL ENCOUNTER (EMERGENCY)
Age: 72
Discharge: HOME | End: 2022-08-02
Attending: EMERGENCY MEDICINE
Payer: MEDICARE

## 2022-08-02 ENCOUNTER — APPOINTMENT (OUTPATIENT)
Dept: GENERAL RADIOLOGY | Age: 72
End: 2022-08-02
Attending: PHYSICIAN ASSISTANT
Payer: MEDICARE

## 2022-08-02 VITALS
BODY MASS INDEX: 34.32 KG/M2 | OXYGEN SATURATION: 99 % | TEMPERATURE: 98 F | RESPIRATION RATE: 20 BRPM | SYSTOLIC BLOOD PRESSURE: 136 MMHG | HEART RATE: 88 BPM | HEIGHT: 65 IN | WEIGHT: 206 LBS | DIASTOLIC BLOOD PRESSURE: 64 MMHG

## 2022-08-02 DIAGNOSIS — M25.572 ACUTE LEFT ANKLE PAIN: ICD-10-CM

## 2022-08-02 DIAGNOSIS — S50.311A ABRASION OF RIGHT ELBOW, INITIAL ENCOUNTER: ICD-10-CM

## 2022-08-02 DIAGNOSIS — S00.03XA HEMATOMA OF SCALP, INITIAL ENCOUNTER: Primary | ICD-10-CM

## 2022-08-02 LAB
ALBUMIN SERPL-MCNC: 3.7 G/DL (ref 3.4–5)
ALBUMIN/GLOB SERPL: 0.9 {RATIO} (ref 1–2)
ALP LIVER SERPL-CCNC: 104 U/L
ALT SERPL-CCNC: 23 U/L
ANION GAP SERPL CALC-SCNC: 5 MMOL/L (ref 0–18)
AST SERPL-CCNC: 12 U/L (ref 15–37)
BASOPHILS # BLD AUTO: 0.03 X10(3) UL (ref 0–0.2)
BASOPHILS NFR BLD AUTO: 0.3 %
BILIRUB SERPL-MCNC: 0.3 MG/DL (ref 0.1–2)
BUN BLD-MCNC: 12 MG/DL (ref 7–18)
CALCIUM BLD-MCNC: 9.8 MG/DL (ref 8.5–10.1)
CHLORIDE SERPL-SCNC: 97 MMOL/L (ref 98–112)
CO2 SERPL-SCNC: 31 MMOL/L (ref 21–32)
CREAT BLD-MCNC: 0.93 MG/DL
EOSINOPHIL # BLD AUTO: 0.15 X10(3) UL (ref 0–0.7)
EOSINOPHIL NFR BLD AUTO: 1.6 %
ERYTHROCYTE [DISTWIDTH] IN BLOOD BY AUTOMATED COUNT: 13.2 %
GFR SERPLBLD BASED ON 1.73 SQ M-ARVRAT: 88 ML/MIN/1.73M2 (ref 60–?)
GLOBULIN PLAS-MCNC: 4 G/DL (ref 2.8–4.4)
GLUCOSE BLD-MCNC: 186 MG/DL (ref 70–99)
HCT VFR BLD AUTO: 41.2 %
HGB BLD-MCNC: 13.8 G/DL
IMM GRANULOCYTES # BLD AUTO: 0.02 X10(3) UL (ref 0–1)
IMM GRANULOCYTES NFR BLD: 0.2 %
LYMPHOCYTES # BLD AUTO: 1.57 X10(3) UL (ref 1–4)
LYMPHOCYTES NFR BLD AUTO: 16.9 %
MCH RBC QN AUTO: 28.2 PG (ref 26–34)
MCHC RBC AUTO-ENTMCNC: 33.5 G/DL (ref 31–37)
MCV RBC AUTO: 84.1 FL
MONOCYTES # BLD AUTO: 1.14 X10(3) UL (ref 0.1–1)
MONOCYTES NFR BLD AUTO: 12.3 %
NEUTROPHILS # BLD AUTO: 6.39 X10 (3) UL (ref 1.5–7.7)
NEUTROPHILS # BLD AUTO: 6.39 X10(3) UL (ref 1.5–7.7)
NEUTROPHILS NFR BLD AUTO: 68.7 %
OSMOLALITY SERPL CALC.SUM OF ELEC: 281 MOSM/KG (ref 275–295)
PLATELET # BLD AUTO: 331 10(3)UL (ref 150–450)
POTASSIUM SERPL-SCNC: 3.5 MMOL/L (ref 3.5–5.1)
PROT SERPL-MCNC: 7.7 G/DL (ref 6.4–8.2)
RBC # BLD AUTO: 4.9 X10(6)UL
SODIUM SERPL-SCNC: 133 MMOL/L (ref 136–145)
WBC # BLD AUTO: 9.3 X10(3) UL (ref 4–11)

## 2022-08-02 PROCEDURE — 36415 COLL VENOUS BLD VENIPUNCTURE: CPT

## 2022-08-02 PROCEDURE — 99284 EMERGENCY DEPT VISIT MOD MDM: CPT

## 2022-08-02 PROCEDURE — 73630 X-RAY EXAM OF FOOT: CPT | Performed by: PHYSICIAN ASSISTANT

## 2022-08-02 PROCEDURE — 70450 CT HEAD/BRAIN W/O DYE: CPT | Performed by: PHYSICIAN ASSISTANT

## 2022-08-02 PROCEDURE — 85025 COMPLETE CBC W/AUTO DIFF WBC: CPT | Performed by: PHYSICIAN ASSISTANT

## 2022-08-02 PROCEDURE — 93010 ELECTROCARDIOGRAM REPORT: CPT

## 2022-08-02 PROCEDURE — 93005 ELECTROCARDIOGRAM TRACING: CPT

## 2022-08-02 PROCEDURE — 73610 X-RAY EXAM OF ANKLE: CPT | Performed by: EMERGENCY MEDICINE

## 2022-08-02 PROCEDURE — 80053 COMPREHEN METABOLIC PANEL: CPT | Performed by: PHYSICIAN ASSISTANT

## 2022-08-02 NOTE — ED INITIAL ASSESSMENT (HPI)
Saturday night PT went up and down ladder several times, by Sunday afternoon left ankle and heel began to hurt, + swelling; more than usual. Today PT states he fell while getting out of the car hitting right back of head on cement from standing height because his left ankle gave out, Denies LOC or blood thinners, Quarter size abrasion noted to site on head  Abrasion noted to right elbow     Pt states he has a diagnosed left ear infection, waiting for abx rx to be filled due to back order at pharmacy

## 2022-08-03 ENCOUNTER — TELEPHONE (OUTPATIENT)
Dept: FAMILY MEDICINE CLINIC | Facility: CLINIC | Age: 72
End: 2022-08-03

## 2022-08-03 NOTE — TELEPHONE ENCOUNTER
Patient called said he cannot get in to podiatrist which Kait Gimenez recommended until later and wanted to know if she had another name of podiatrist she recommends or if she could get him an appt with the podiatrist she recommended sooner

## 2022-08-03 NOTE — ED PROVIDER NOTES
I reviewed that chart and discussed the case. I have examined the patient and noted mild diffuse tenderness to the left ankle with mild swelling. No erythema. No joint irritability. 2+ DP PT pulses left lower extremity. No tenderness more proximally along the tibia/fibula. No left calf swelling or tenderness. Patient is awake, alert, no acute distress. Pupils are PERRL. EOMI. Small abrasion to the right posterior parietal scalp without laceration. No midline cervical tenderness. Regular rate and rhythm normal S1-S2. Lungs clear to auscultation bilaterally. Speech is normal.  Intact strength and sensation bilateral upper and lower extremities. EKG    Rate, intervals and axes as noted on EKG Report. Rate: 91  Rhythm: Sinus Rhythm  Reading: Left anterior fascicular block. No ischemic changes no dysrhythmia          I provided the substantive portion of care for this patient. I personally performed the medical decision making for this encounter. Patient describes a mechanical fall. States that he turned, lost his balance and fell striking his head on the ground. Did not lose consciousness. No headache or vision changes. Did scrape his right forearm but denies any bony pain can move it normally. Denies injury otherwise. Has been in his usual state of health recently no fevers, chest pain, shortness of breath, vomiting etc.  His work-up is reassuring. Should follow-up with primary as an outpatient. I agree with the following clinical impression(s): Fall, head injury, elbow abrasion, ankle pain      I agree with the plan as noted.

## 2022-08-03 NOTE — TELEPHONE ENCOUNTER
Received fax from pharmacy stating Clemencia Ring is not covered by plan as well as the Rybelsus. Patient must try and fail Trulicity, or Bydureon. Please advise.

## 2022-08-03 NOTE — TELEPHONE ENCOUNTER
Per ,   Patient called said he cannot get in to podiatrist which Svitlana Ryan recommended until later and wanted to know if she had another name of podiatrist she recommends or if she could get him an appt with the podiatrist she recommended sooner

## 2022-08-03 NOTE — TELEPHONE ENCOUNTER
Returned call to patient, instructed him to request a sooner appt with any of the providers in the podiatry group. Verbalized understanding.

## 2022-08-03 NOTE — TELEPHONE ENCOUNTER
Pt informed 4-pronged cane would provide more stability. Advised to consult with podiatrist for ankle pain, orthopedic shoes. Requested recommendation for podiatrist - his retired. Taylor Ro recommended. Verbalized understanding.

## 2022-08-04 LAB
ATRIAL RATE: 91 BPM
P AXIS: 41 DEGREES
P-R INTERVAL: 194 MS
Q-T INTERVAL: 368 MS
QRS DURATION: 110 MS
QTC CALCULATION (BEZET): 452 MS
R AXIS: -60 DEGREES
T AXIS: 47 DEGREES
VENTRICULAR RATE: 91 BPM

## 2022-08-04 RX ORDER — DULAGLUTIDE 0.75 MG/.5ML
0.75 INJECTION, SOLUTION SUBCUTANEOUS
Qty: 2 ML | Refills: 0 | Status: SHIPPED | OUTPATIENT
Start: 2022-08-04

## 2022-08-04 NOTE — TELEPHONE ENCOUNTER
Start Trulicity 3.91 mg subcutaneous weekly then after 1 month increase to 1.5 mg subcutaneous weekly if he does not tolerate we can try for Ozempic again.

## 2022-08-05 ENCOUNTER — TELEPHONE (OUTPATIENT)
Dept: FAMILY MEDICINE CLINIC | Facility: CLINIC | Age: 72
End: 2022-08-05

## 2022-08-05 NOTE — TELEPHONE ENCOUNTER
Patient calling to let BOD[facility] know he went to   [facility] ER they did     EKG, CT brain,Xray,Blood work    Did schedule follow up 8/22

## 2022-08-22 ENCOUNTER — OFFICE VISIT (OUTPATIENT)
Dept: FAMILY MEDICINE CLINIC | Facility: CLINIC | Age: 72
End: 2022-08-22
Payer: MEDICARE

## 2022-08-22 VITALS
BODY MASS INDEX: 32.99 KG/M2 | DIASTOLIC BLOOD PRESSURE: 60 MMHG | OXYGEN SATURATION: 98 % | HEIGHT: 65.04 IN | SYSTOLIC BLOOD PRESSURE: 120 MMHG | TEMPERATURE: 97 F | WEIGHT: 198 LBS | HEART RATE: 88 BPM

## 2022-08-22 DIAGNOSIS — I67.9 CEREBROVASCULAR SMALL VESSEL DISEASE: ICD-10-CM

## 2022-08-22 DIAGNOSIS — R94.31 ABNORMAL EKG: ICD-10-CM

## 2022-08-22 DIAGNOSIS — R42 DIZZINESS: Primary | ICD-10-CM

## 2022-08-22 DIAGNOSIS — E66.9 OBESITY (BMI 30.0-34.9): ICD-10-CM

## 2022-08-22 DIAGNOSIS — E11.65 UNCONTROLLED TYPE 2 DIABETES MELLITUS WITH HYPERGLYCEMIA (HCC): ICD-10-CM

## 2022-08-22 DIAGNOSIS — M19.072 PRIMARY OSTEOARTHRITIS OF LEFT FOOT: ICD-10-CM

## 2022-08-22 PROCEDURE — 99214 OFFICE O/P EST MOD 30 MIN: CPT | Performed by: FAMILY MEDICINE

## 2022-08-22 RX ORDER — DEXMETHYLPHENIDATE HYDROCHLORIDE 20 MG/1
1 CAPSULE, EXTENDED RELEASE ORAL DAILY
COMMUNITY
Start: 2022-08-18

## 2022-08-22 RX ORDER — DULAGLUTIDE 1.5 MG/.5ML
1.5 INJECTION, SOLUTION SUBCUTANEOUS
Qty: 4 PEN | Refills: 1 | Status: SHIPPED | OUTPATIENT
Start: 2022-08-22

## 2022-08-23 PROBLEM — I67.9 CEREBROVASCULAR SMALL VESSEL DISEASE: Status: ACTIVE | Noted: 2022-08-23

## 2022-08-23 PROBLEM — M19.072 PRIMARY OSTEOARTHRITIS OF LEFT FOOT: Status: ACTIVE | Noted: 2022-08-23

## 2022-08-24 ENCOUNTER — OFFICE VISIT (OUTPATIENT)
Dept: PODIATRY CLINIC | Facility: CLINIC | Age: 72
End: 2022-08-24
Payer: MEDICARE

## 2022-08-24 VITALS — HEART RATE: 84 BPM | DIASTOLIC BLOOD PRESSURE: 68 MMHG | SYSTOLIC BLOOD PRESSURE: 120 MMHG

## 2022-08-24 DIAGNOSIS — M20.42 HAMMER TOES OF BOTH FEET: ICD-10-CM

## 2022-08-24 DIAGNOSIS — M79.672 LEFT FOOT PAIN: ICD-10-CM

## 2022-08-24 DIAGNOSIS — M20.41 HAMMER TOES OF BOTH FEET: ICD-10-CM

## 2022-08-24 DIAGNOSIS — M19.072 OSTEOARTHRITIS OF LEFT MIDFOOT: Primary | ICD-10-CM

## 2022-08-24 DIAGNOSIS — M19.072 ARTHRITIS OF LEFT ANKLE: ICD-10-CM

## 2022-08-24 DIAGNOSIS — E11.42 DIABETIC POLYNEUROPATHY ASSOCIATED WITH TYPE 2 DIABETES MELLITUS (HCC): ICD-10-CM

## 2022-08-24 PROCEDURE — 20600 DRAIN/INJ JOINT/BURSA W/O US: CPT | Performed by: STUDENT IN AN ORGANIZED HEALTH CARE EDUCATION/TRAINING PROGRAM

## 2022-08-24 PROCEDURE — 1125F AMNT PAIN NOTED PAIN PRSNT: CPT | Performed by: STUDENT IN AN ORGANIZED HEALTH CARE EDUCATION/TRAINING PROGRAM

## 2022-08-24 PROCEDURE — 99214 OFFICE O/P EST MOD 30 MIN: CPT | Performed by: STUDENT IN AN ORGANIZED HEALTH CARE EDUCATION/TRAINING PROGRAM

## 2022-08-24 RX ORDER — DEXAMETHASONE SODIUM PHOSPHATE 4 MG/ML
2 VIAL (ML) INJECTION ONCE
Status: COMPLETED | OUTPATIENT
Start: 2022-08-24 | End: 2022-08-24

## 2022-08-24 RX ADMIN — DEXAMETHASONE SODIUM PHOSPHATE 2 MG: 4 MG/ML VIAL (ML) INJECTION at 13:45:00

## 2022-08-24 NOTE — PROGRESS NOTES
Per verbal order from Dr. Bailey Comment 0.5mL of kenalog, 0.5 dextamethasone lidocaine & 1.0mL of 0.5% marcaine was drawn up and injected into the patient's left foot by the provider.      Cinthia Almaraz RN

## 2022-08-24 NOTE — PATIENT INSTRUCTIONS
-Monitor response to steroid injection.  -Ambulate with supportive shoes and inserts and avoid walking barefoot.  -Follow-up in 1 month for reevaluation.

## 2022-08-25 ENCOUNTER — TELEPHONE (OUTPATIENT)
Facility: LOCATION | Age: 72
End: 2022-08-25

## 2022-08-30 ENCOUNTER — TELEPHONE (OUTPATIENT)
Facility: LOCATION | Age: 72
End: 2022-08-30

## 2022-08-31 ENCOUNTER — HOSPITAL ENCOUNTER (EMERGENCY)
Age: 72
Discharge: HOME | End: 2022-08-31
Attending: EMERGENCY MEDICINE
Payer: MEDICARE

## 2022-08-31 VITALS
BODY MASS INDEX: 32.32 KG/M2 | RESPIRATION RATE: 18 BRPM | SYSTOLIC BLOOD PRESSURE: 123 MMHG | HEART RATE: 99 BPM | WEIGHT: 194 LBS | TEMPERATURE: 98 F | HEIGHT: 65 IN | DIASTOLIC BLOOD PRESSURE: 70 MMHG | OXYGEN SATURATION: 96 %

## 2022-08-31 DIAGNOSIS — U07.1 COVID: Primary | ICD-10-CM

## 2022-08-31 LAB — SARS-COV-2 RNA RESP QL NAA+PROBE: DETECTED

## 2022-08-31 PROCEDURE — 99283 EMERGENCY DEPT VISIT LOW MDM: CPT

## 2022-08-31 RX ORDER — NIRMATRELVIR AND RITONAVIR 300-100 MG
KIT ORAL
Qty: 30 TABLET | Refills: 0 | Status: SHIPPED | OUTPATIENT
Start: 2022-08-31 | End: 2022-09-05

## 2022-08-31 NOTE — ED INITIAL ASSESSMENT (HPI)
Pt to ed with c/o waking up with congestion and a headache this morning. PT has concerns that he may have COVID.  No known exposure

## 2022-09-06 ENCOUNTER — TELEPHONE (OUTPATIENT)
Dept: FAMILY MEDICINE CLINIC | Facility: CLINIC | Age: 72
End: 2022-09-06

## 2022-09-06 NOTE — TELEPHONE ENCOUNTER
Faxed signed Diabetic statement of certifying physicians for Therapeutic shoes to [facility] to fax # [phone]

## 2022-09-27 RX ORDER — DULAGLUTIDE 1.5 MG/.5ML
1.5 INJECTION, SOLUTION SUBCUTANEOUS
Qty: 4 PEN | Refills: 1 | OUTPATIENT
Start: 2022-09-27

## 2022-10-03 ENCOUNTER — OFFICE VISIT (OUTPATIENT)
Dept: SURGERY | Facility: CLINIC | Age: 72
End: 2022-10-03
Payer: MEDICARE

## 2022-10-03 ENCOUNTER — LAB ENCOUNTER (OUTPATIENT)
Dept: LAB | Age: 72
End: 2022-10-03
Attending: UROLOGY
Payer: MEDICARE

## 2022-10-03 DIAGNOSIS — C61 PROSTATE CANCER (HCC): ICD-10-CM

## 2022-10-03 DIAGNOSIS — N39.41 URGE INCONTINENCE: ICD-10-CM

## 2022-10-03 DIAGNOSIS — R39.12 WEAK URINARY STREAM: ICD-10-CM

## 2022-10-03 DIAGNOSIS — C61 PROSTATE CANCER (HCC): Primary | ICD-10-CM

## 2022-10-03 DIAGNOSIS — R30.0 DYSURIA: ICD-10-CM

## 2022-10-03 DIAGNOSIS — N52.9 ERECTILE DYSFUNCTION, UNSPECIFIED ERECTILE DYSFUNCTION TYPE: ICD-10-CM

## 2022-10-03 LAB
APPEARANCE: CLEAR
BILIRUBIN: NEGATIVE
GLUCOSE (URINE DIPSTICK): NEGATIVE MG/DL
KETONES (URINE DIPSTICK): NEGATIVE MG/DL
LEUKOCYTES: NEGATIVE
MULTISTIX LOT#: NORMAL NUMERIC
NITRITE, URINE: NEGATIVE
OCCULT BLOOD: NEGATIVE
PH, URINE: 5.5 (ref 4.5–8)
PROTEIN (URINE DIPSTICK): NEGATIVE MG/DL
PSA SERPL-MCNC: 0.41 NG/ML (ref ?–4)
SPECIFIC GRAVITY: >=1.03 (ref 1–1.03)
URINE-COLOR: YELLOW
UROBILINOGEN,SEMI-QN: 0.2 MG/DL (ref 0–1.9)

## 2022-10-03 PROCEDURE — 81003 URINALYSIS AUTO W/O SCOPE: CPT | Performed by: UROLOGY

## 2022-10-03 PROCEDURE — 99214 OFFICE O/P EST MOD 30 MIN: CPT | Performed by: UROLOGY

## 2022-10-03 PROCEDURE — 84153 ASSAY OF PSA TOTAL: CPT

## 2022-10-03 PROCEDURE — 36415 COLL VENOUS BLD VENIPUNCTURE: CPT

## 2022-10-18 ENCOUNTER — MED REC SCAN ONLY (OUTPATIENT)
Facility: CLINIC | Age: 72
End: 2022-10-18

## 2022-10-20 ENCOUNTER — LAB ENCOUNTER (OUTPATIENT)
Dept: LAB | Age: 72
End: 2022-10-20
Attending: FAMILY MEDICINE
Payer: MEDICARE

## 2022-10-20 DIAGNOSIS — E11.65 UNCONTROLLED TYPE 2 DIABETES MELLITUS WITH HYPERGLYCEMIA (HCC): ICD-10-CM

## 2022-10-20 LAB
ALBUMIN SERPL-MCNC: 3.7 G/DL (ref 3.4–5)
ALBUMIN/GLOB SERPL: 0.9 {RATIO} (ref 1–2)
ALP LIVER SERPL-CCNC: 110 U/L
ALT SERPL-CCNC: 27 U/L
ANION GAP SERPL CALC-SCNC: 4 MMOL/L (ref 0–18)
AST SERPL-CCNC: 14 U/L (ref 15–37)
BILIRUB SERPL-MCNC: 0.4 MG/DL (ref 0.1–2)
BUN BLD-MCNC: 16 MG/DL (ref 7–18)
CALCIUM BLD-MCNC: 9.4 MG/DL (ref 8.5–10.1)
CHLORIDE SERPL-SCNC: 104 MMOL/L (ref 98–112)
CO2 SERPL-SCNC: 31 MMOL/L (ref 21–32)
CREAT BLD-MCNC: 0.86 MG/DL
EST. AVERAGE GLUCOSE BLD GHB EST-MCNC: 128 MG/DL (ref 68–126)
FASTING STATUS PATIENT QL REPORTED: YES
GFR SERPLBLD BASED ON 1.73 SQ M-ARVRAT: 93 ML/MIN/1.73M2 (ref 60–?)
GLOBULIN PLAS-MCNC: 4 G/DL (ref 2.8–4.4)
GLUCOSE BLD-MCNC: 103 MG/DL (ref 70–99)
HBA1C MFR BLD: 6.1 % (ref ?–5.7)
OSMOLALITY SERPL CALC.SUM OF ELEC: 289 MOSM/KG (ref 275–295)
POTASSIUM SERPL-SCNC: 3.7 MMOL/L (ref 3.5–5.1)
PROT SERPL-MCNC: 7.7 G/DL (ref 6.4–8.2)
SODIUM SERPL-SCNC: 139 MMOL/L (ref 136–145)

## 2022-10-20 PROCEDURE — 80053 COMPREHEN METABOLIC PANEL: CPT

## 2022-10-20 PROCEDURE — 36415 COLL VENOUS BLD VENIPUNCTURE: CPT

## 2022-10-20 PROCEDURE — 83036 HEMOGLOBIN GLYCOSYLATED A1C: CPT

## 2022-10-21 NOTE — PROGRESS NOTES
See you at your office appointment on 10/24/2022 hemoglobin A1c much better!   Kidney function liver function tests are normal.

## 2022-10-22 DIAGNOSIS — I10 ESSENTIAL HYPERTENSION WITH GOAL BLOOD PRESSURE LESS THAN 130/80: ICD-10-CM

## 2022-10-24 ENCOUNTER — OFFICE VISIT (OUTPATIENT)
Dept: FAMILY MEDICINE CLINIC | Facility: CLINIC | Age: 72
End: 2022-10-24
Payer: MEDICARE

## 2022-10-24 VITALS
HEART RATE: 97 BPM | DIASTOLIC BLOOD PRESSURE: 68 MMHG | WEIGHT: 195 LBS | RESPIRATION RATE: 18 BRPM | BODY MASS INDEX: 32.49 KG/M2 | OXYGEN SATURATION: 98 % | HEIGHT: 65 IN | SYSTOLIC BLOOD PRESSURE: 128 MMHG | TEMPERATURE: 97 F

## 2022-10-24 DIAGNOSIS — E03.8 HYPOTHYROIDISM DUE TO HASHIMOTO'S THYROIDITIS: ICD-10-CM

## 2022-10-24 DIAGNOSIS — E11.9 CONTROLLED TYPE 2 DIABETES MELLITUS WITHOUT COMPLICATION, WITHOUT LONG-TERM CURRENT USE OF INSULIN (HCC): Primary | ICD-10-CM

## 2022-10-24 DIAGNOSIS — F90.0 ATTENTION DEFICIT HYPERACTIVITY DISORDER (ADHD), PREDOMINANTLY INATTENTIVE TYPE: ICD-10-CM

## 2022-10-24 DIAGNOSIS — Z23 NEED FOR INFLUENZA VACCINATION: ICD-10-CM

## 2022-10-24 DIAGNOSIS — E78.2 MIXED HYPERLIPIDEMIA: ICD-10-CM

## 2022-10-24 DIAGNOSIS — E06.3 HYPOTHYROIDISM DUE TO HASHIMOTO'S THYROIDITIS: ICD-10-CM

## 2022-10-24 DIAGNOSIS — I10 ESSENTIAL HYPERTENSION WITH GOAL BLOOD PRESSURE LESS THAN 130/80: ICD-10-CM

## 2022-10-24 DIAGNOSIS — E66.9 OBESITY (BMI 30.0-34.9): ICD-10-CM

## 2022-10-24 PROCEDURE — 90662 IIV NO PRSV INCREASED AG IM: CPT | Performed by: FAMILY MEDICINE

## 2022-10-24 PROCEDURE — 99215 OFFICE O/P EST HI 40 MIN: CPT | Performed by: FAMILY MEDICINE

## 2022-10-24 PROCEDURE — G0008 ADMIN INFLUENZA VIRUS VAC: HCPCS | Performed by: FAMILY MEDICINE

## 2022-10-24 RX ORDER — AMLODIPINE BESYLATE 5 MG/1
TABLET ORAL
Qty: 90 TABLET | Refills: 1 | OUTPATIENT
Start: 2022-10-24

## 2022-10-24 RX ORDER — DEXMETHYLPHENIDATE HYDROCHLORIDE 20 MG/1
20 CAPSULE, EXTENDED RELEASE ORAL DAILY
Qty: 30 CAPSULE | Refills: 0 | Status: SHIPPED | OUTPATIENT
Start: 2022-12-27 | End: 2023-01-26

## 2022-10-24 RX ORDER — DEXMETHYLPHENIDATE HYDROCHLORIDE 20 MG/1
20 CAPSULE, EXTENDED RELEASE ORAL DAILY
Qty: 30 CAPSULE | Refills: 0 | Status: SHIPPED | OUTPATIENT
Start: 2022-11-27 | End: 2022-12-27

## 2022-10-24 RX ORDER — AMLODIPINE BESYLATE 5 MG/1
5 TABLET ORAL DAILY
Qty: 90 TABLET | Refills: 1 | Status: SHIPPED | OUTPATIENT
Start: 2022-10-24

## 2022-10-24 RX ORDER — DULAGLUTIDE 1.5 MG/.5ML
1.5 INJECTION, SOLUTION SUBCUTANEOUS
Qty: 4 PEN | Refills: 1 | Status: CANCELLED | OUTPATIENT
Start: 2022-10-24

## 2022-10-24 RX ORDER — DEXMETHYLPHENIDATE HYDROCHLORIDE 20 MG/1
20 CAPSULE, EXTENDED RELEASE ORAL DAILY
Qty: 30 CAPSULE | Refills: 0 | Status: SHIPPED | OUTPATIENT
Start: 2022-10-27 | End: 2022-11-26

## 2022-10-24 RX ORDER — DULAGLUTIDE 3 MG/.5ML
3 INJECTION, SOLUTION SUBCUTANEOUS
Qty: 12 PEN | Refills: 0 | Status: SHIPPED
Start: 2022-10-24

## 2022-10-28 RX ORDER — DULAGLUTIDE 1.5 MG/.5ML
INJECTION, SOLUTION SUBCUTANEOUS
Qty: 2 ML | Refills: 0 | OUTPATIENT
Start: 2022-10-28

## 2022-10-29 DIAGNOSIS — E11.9 CONTROLLED TYPE 2 DIABETES MELLITUS WITHOUT COMPLICATION, WITHOUT LONG-TERM CURRENT USE OF INSULIN (HCC): ICD-10-CM

## 2022-10-31 DIAGNOSIS — E11.9 CONTROLLED TYPE 2 DIABETES MELLITUS WITHOUT COMPLICATION, WITHOUT LONG-TERM CURRENT USE OF INSULIN (HCC): ICD-10-CM

## 2022-10-31 RX ORDER — DULAGLUTIDE 1.5 MG/.5ML
INJECTION, SOLUTION SUBCUTANEOUS
Qty: 2 ML | Refills: 0 | OUTPATIENT
Start: 2022-10-31

## 2022-10-31 RX ORDER — DULAGLUTIDE 3 MG/.5ML
3 INJECTION, SOLUTION SUBCUTANEOUS
Qty: 12 PEN | Refills: 0 | OUTPATIENT
Start: 2022-10-31

## 2022-11-01 ENCOUNTER — TELEPHONE (OUTPATIENT)
Dept: FAMILY MEDICINE CLINIC | Facility: CLINIC | Age: 72
End: 2022-11-01

## 2022-11-01 ENCOUNTER — PATIENT MESSAGE (OUTPATIENT)
Dept: FAMILY MEDICINE CLINIC | Facility: CLINIC | Age: 72
End: 2022-11-01

## 2022-11-01 DIAGNOSIS — E11.9 CONTROLLED TYPE 2 DIABETES MELLITUS WITHOUT COMPLICATION, WITHOUT LONG-TERM CURRENT USE OF INSULIN (HCC): ICD-10-CM

## 2022-11-01 RX ORDER — DULAGLUTIDE 3 MG/.5ML
3 INJECTION, SOLUTION SUBCUTANEOUS
Qty: 12 PEN | Refills: 0 | OUTPATIENT
Start: 2022-11-01

## 2022-11-01 RX ORDER — LANCETS 28 GAUGE
EACH MISCELLANEOUS
Qty: 100 EACH | Refills: 3 | Status: SHIPPED | OUTPATIENT
Start: 2022-11-01

## 2022-11-01 NOTE — TELEPHONE ENCOUNTER
From: Jose Leonardo  To:  Mayi Pedroza PA-C  Sent: 11/1/2022 9:26 AM CDT  Subject: TRUICITY    IS THERE A PROBLEM REFILLING THIS [address] [place] [address] Drive

## 2022-11-01 NOTE — TELEPHONE ENCOUNTER
Pt called and stated that he has a med question. States pharm does not have a dosage for new med.  Requesting to speak to someone

## 2022-11-01 NOTE — TELEPHONE ENCOUNTER
Per [employer], script sent for Trulicity on 81/67/76 not received. Verbal order given, requested it be filled for patient, patient informed.

## 2022-11-07 ENCOUNTER — TELEMEDICINE (OUTPATIENT)
Facility: CLINIC | Age: 72
End: 2022-11-07

## 2022-11-07 DIAGNOSIS — E66.09 OBESITY DUE TO EXCESS CALORIES WITHOUT SERIOUS COMORBIDITY, UNSPECIFIED CLASSIFICATION: ICD-10-CM

## 2022-11-07 DIAGNOSIS — G47.10 HYPERSOMNIA: ICD-10-CM

## 2022-11-07 DIAGNOSIS — G47.33 OBSTRUCTIVE SLEEP APNEA: Primary | ICD-10-CM

## 2022-11-07 NOTE — PATIENT INSTRUCTIONS
Please be advised:   Do not drive while sleepy   Take CPAP/BiPAP machine to any procedure that requires sedation     When should I clean my machine & supplies? Clean mask cushions or nasal pillow, headgear, tubing, and humidifier chamber with mild soap (Cheryl) and water   If water chamber has hard water buildup (white crust), soak in warm water & vinegar mix 50/50. Rinse and hang dry     DAILY   Wipe mask cushions or nasal pillow   Empty & rinse water chamber- refill with distilled water     WEEKLY   Clean mask cushions or nasal pillow, headgear, tubing, and humidifier chamber with mild soap (Cheryl) and water   If water chamber has hard water buildup (white crust), soak in warm water & vinegar mix 50/50. Rinse and hang dry     When should supplies be replaced? Contact your home care company for replacement supplies, or if your machine is malfunctioning   *Below is a general guideline of what we recommend. Replacement of supplies differs depending on your insurance company*   MONTHLY: Replace filter and mask cushion   6 MONTHS: Replace headgear and tubing     Travel Tips   Keep CPAP/BiPAP in original bag when traveling, and place luggage tag on bag   Most airlines consider CPAP/BiPAP to be a medical device, therefore it is a free carry-on item   If unable to get distilled water, bottled water is safe while traveling.  DO NOT use tap water   When traveling outside the [place] only a power adapter is necessary (CPAP can operate without a converter), bring an extension cord   Consider purchasing or renting a travel CPAP (not covered by insurance)     Dry Mouth/Nose   Turn up the humidity on your machine (select \"Options\" from the home screen)   Place a cool mist humidifier at your bedside   Over-the-counter remedies: Biotene, XyliMelts, NasoGel     Air Leak   Try adjusting your mask/headgear while laying in sleeping position vs. sitting up   Wash and dry your face prior to putting your mask on   If applicable: shave facial hair at night (or before wearing CPAP)   Purchase \"RemZzzs\" (through home care co., [email or web address], or remzzzs. com)   100% cotton knit barrier that goes between your mask cushion and your skin   Replace your mask cushion (at least once per month) and/or headgear (every 3-6 months)     Nasal Congestion   CPAP therapy can cause nasal passages to dry out, & mucous membranes try to protect the nasal passages by producing excess mucous, so congestion results. Over-the counter remedies: Flonase, Nasacort, Sinus Rinses (Neti-Pot), DO NOT USE Afrin   Try a mask that goes over the nose and mouth     Skin Irritation   Clean supplies regularly (Citrus II Mask Wipes, Control III disinfectant solution)   Try over the counter creams such as hydrocortisone 1% (apply in the morning after showering)   Your headgear may be too tight, replace supplies so you don't need to adjust so tightly   Try RemZzzs (100% cotton knit barrier that goes between your mask cushion and your skin)     Gas/Bloating   Try a different sleeping position to keep air out of the stomach. Lay on the left side or rotate to the right side. Incline with pillows or lay flat.    Over-the-counter remedies: Simethicone

## 2023-01-11 DIAGNOSIS — E11.9 CONTROLLED TYPE 2 DIABETES MELLITUS WITHOUT COMPLICATION, WITHOUT LONG-TERM CURRENT USE OF INSULIN (HCC): ICD-10-CM

## 2023-01-11 RX ORDER — DULAGLUTIDE 3 MG/.5ML
INJECTION, SOLUTION SUBCUTANEOUS
Qty: 6 ML | Refills: 0 | Status: SHIPPED | OUTPATIENT
Start: 2023-01-11

## 2023-01-11 NOTE — TELEPHONE ENCOUNTER
Pt requesting refill of TRULICITY 3UG/5.4OJ SDP 0.5ML    Last Time Medication was Prescribed :  10/24/2022    Last Office Visit with Provider: 10/24/2022    Recommended to return by Provider: Return in about 3 months (around 1/24/2023) for Medication follow-up.     Appt scheduled on 1/23/2023     Passed protocol, refill approved, sent to pharmacy   Diabetic Medication Protocol Passed 01/11/2023 12:51 PM   Protocol Details  HgBA1C procedure resulted in past 6 months    Last HgBA1C < 7.5    Microalbumin procedure in past 12 months or taking ACE/ARB    Appointment in past 6 or next 3 months        Increase Trulicity from 1.5 mg to 3 mg weekly

## 2023-01-17 ENCOUNTER — LAB ENCOUNTER (OUTPATIENT)
Dept: LAB | Age: 73
End: 2023-01-17
Attending: FAMILY MEDICINE
Payer: MEDICARE

## 2023-01-17 DIAGNOSIS — I10 ESSENTIAL HYPERTENSION WITH GOAL BLOOD PRESSURE LESS THAN 130/80: ICD-10-CM

## 2023-01-17 DIAGNOSIS — E11.9 CONTROLLED TYPE 2 DIABETES MELLITUS WITHOUT COMPLICATION, WITHOUT LONG-TERM CURRENT USE OF INSULIN (HCC): ICD-10-CM

## 2023-01-17 DIAGNOSIS — E78.2 MIXED HYPERLIPIDEMIA: ICD-10-CM

## 2023-01-17 DIAGNOSIS — E03.8 HYPOTHYROIDISM DUE TO HASHIMOTO'S THYROIDITIS: ICD-10-CM

## 2023-01-17 DIAGNOSIS — E06.3 HYPOTHYROIDISM DUE TO HASHIMOTO'S THYROIDITIS: ICD-10-CM

## 2023-01-17 LAB
ALBUMIN SERPL-MCNC: 3.6 G/DL (ref 3.4–5)
ALBUMIN/GLOB SERPL: 1 {RATIO} (ref 1–2)
ALP LIVER SERPL-CCNC: 107 U/L
ALT SERPL-CCNC: 24 U/L
ANION GAP SERPL CALC-SCNC: 3 MMOL/L (ref 0–18)
AST SERPL-CCNC: 16 U/L (ref 15–37)
BILIRUB SERPL-MCNC: 0.4 MG/DL (ref 0.1–2)
BUN BLD-MCNC: 16 MG/DL (ref 7–18)
CALCIUM BLD-MCNC: 9.5 MG/DL (ref 8.5–10.1)
CHLORIDE SERPL-SCNC: 103 MMOL/L (ref 98–112)
CHOLEST SERPL-MCNC: 112 MG/DL (ref ?–200)
CO2 SERPL-SCNC: 32 MMOL/L (ref 21–32)
CREAT BLD-MCNC: 0.91 MG/DL
CREAT UR-SCNC: 132 MG/DL
EST. AVERAGE GLUCOSE BLD GHB EST-MCNC: 117 MG/DL (ref 68–126)
FASTING PATIENT LIPID ANSWER: YES
FASTING STATUS PATIENT QL REPORTED: YES
GFR SERPLBLD BASED ON 1.73 SQ M-ARVRAT: 90 ML/MIN/1.73M2 (ref 60–?)
GLOBULIN PLAS-MCNC: 3.7 G/DL (ref 2.8–4.4)
GLUCOSE BLD-MCNC: 95 MG/DL (ref 70–99)
HBA1C MFR BLD: 5.7 % (ref ?–5.7)
HDLC SERPL-MCNC: 49 MG/DL (ref 40–59)
LDLC SERPL CALC-MCNC: 45 MG/DL (ref ?–100)
MICROALBUMIN UR-MCNC: 1.01 MG/DL
MICROALBUMIN/CREAT 24H UR-RTO: 7.7 UG/MG (ref ?–30)
NONHDLC SERPL-MCNC: 63 MG/DL (ref ?–130)
OSMOLALITY SERPL CALC.SUM OF ELEC: 287 MOSM/KG (ref 275–295)
POTASSIUM SERPL-SCNC: 3.7 MMOL/L (ref 3.5–5.1)
PROT SERPL-MCNC: 7.3 G/DL (ref 6.4–8.2)
SODIUM SERPL-SCNC: 138 MMOL/L (ref 136–145)
T4 FREE SERPL-MCNC: 0.9 NG/DL (ref 0.8–1.7)
TRIGL SERPL-MCNC: 94 MG/DL (ref 30–149)
TSI SER-ACNC: 1.98 MIU/ML (ref 0.36–3.74)
VLDLC SERPL CALC-MCNC: 13 MG/DL (ref 0–30)

## 2023-01-17 PROCEDURE — 83036 HEMOGLOBIN GLYCOSYLATED A1C: CPT

## 2023-01-17 PROCEDURE — 82570 ASSAY OF URINE CREATININE: CPT

## 2023-01-17 PROCEDURE — 80061 LIPID PANEL: CPT

## 2023-01-17 PROCEDURE — 80053 COMPREHEN METABOLIC PANEL: CPT

## 2023-01-17 PROCEDURE — 82043 UR ALBUMIN QUANTITATIVE: CPT

## 2023-01-17 PROCEDURE — 84439 ASSAY OF FREE THYROXINE: CPT

## 2023-01-17 PROCEDURE — 36415 COLL VENOUS BLD VENIPUNCTURE: CPT

## 2023-01-17 PROCEDURE — 84443 ASSAY THYROID STIM HORMONE: CPT

## 2023-01-17 NOTE — PROGRESS NOTES
Urine microalbumin creatinine ratio is normal.  Normal kidney and liver function testing. Normal blood sugar testing.   Normal lipid testing   Normal thyroid testing  Sincerely,   Tommas Galeazzi PA-C

## 2023-01-18 ENCOUNTER — TELEPHONE (OUTPATIENT)
Dept: FAMILY MEDICINE CLINIC | Facility: CLINIC | Age: 73
End: 2023-01-18

## 2023-01-18 DIAGNOSIS — E11.9 CONTROLLED TYPE 2 DIABETES MELLITUS WITHOUT COMPLICATION, WITHOUT LONG-TERM CURRENT USE OF INSULIN (HCC): Primary | ICD-10-CM

## 2023-01-19 DIAGNOSIS — I10 ESSENTIAL HYPERTENSION WITH GOAL BLOOD PRESSURE LESS THAN 130/80: ICD-10-CM

## 2023-01-19 DIAGNOSIS — E11.65 UNCONTROLLED TYPE 2 DIABETES MELLITUS WITH HYPERGLYCEMIA (HCC): ICD-10-CM

## 2023-01-19 DIAGNOSIS — F41.1 GAD (GENERALIZED ANXIETY DISORDER): ICD-10-CM

## 2023-01-19 RX ORDER — METFORMIN HYDROCHLORIDE 750 MG/1
TABLET, EXTENDED RELEASE ORAL
Qty: 180 TABLET | Refills: 1 | Status: SHIPPED | OUTPATIENT
Start: 2023-01-19

## 2023-01-19 RX ORDER — HYDROCHLOROTHIAZIDE 25 MG/1
TABLET ORAL
Qty: 90 TABLET | Refills: 1 | Status: SHIPPED | OUTPATIENT
Start: 2023-01-19

## 2023-01-19 RX ORDER — LOSARTAN POTASSIUM 100 MG/1
TABLET ORAL
Qty: 90 TABLET | Refills: 1 | Status: SHIPPED | OUTPATIENT
Start: 2023-01-19

## 2023-01-19 RX ORDER — BUPROPION HYDROCHLORIDE 300 MG/1
TABLET ORAL
Qty: 90 TABLET | Refills: 0 | Status: SHIPPED | OUTPATIENT
Start: 2023-01-19

## 2023-01-23 ENCOUNTER — OFFICE VISIT (OUTPATIENT)
Dept: FAMILY MEDICINE CLINIC | Facility: CLINIC | Age: 73
End: 2023-01-23
Payer: MEDICARE

## 2023-01-23 VITALS
BODY MASS INDEX: 31.76 KG/M2 | OXYGEN SATURATION: 98 % | HEIGHT: 65 IN | TEMPERATURE: 97 F | HEART RATE: 88 BPM | WEIGHT: 190.63 LBS | DIASTOLIC BLOOD PRESSURE: 76 MMHG | SYSTOLIC BLOOD PRESSURE: 130 MMHG | RESPIRATION RATE: 18 BRPM

## 2023-01-23 DIAGNOSIS — C61 PROSTATE CANCER (HCC): ICD-10-CM

## 2023-01-23 DIAGNOSIS — F32.5 DEPRESSION, MAJOR, IN REMISSION (HCC): ICD-10-CM

## 2023-01-23 DIAGNOSIS — F90.0 ATTENTION DEFICIT HYPERACTIVITY DISORDER (ADHD), PREDOMINANTLY INATTENTIVE TYPE: ICD-10-CM

## 2023-01-23 DIAGNOSIS — Z79.899 MEDICATION MANAGEMENT: ICD-10-CM

## 2023-01-23 DIAGNOSIS — F41.1 GAD (GENERALIZED ANXIETY DISORDER): ICD-10-CM

## 2023-01-23 DIAGNOSIS — E11.9 CONTROLLED TYPE 2 DIABETES MELLITUS WITHOUT COMPLICATION, WITHOUT LONG-TERM CURRENT USE OF INSULIN (HCC): Primary | ICD-10-CM

## 2023-01-23 PROCEDURE — 99215 OFFICE O/P EST HI 40 MIN: CPT | Performed by: FAMILY MEDICINE

## 2023-01-23 RX ORDER — DEXMETHYLPHENIDATE HYDROCHLORIDE 20 MG/1
20 CAPSULE, EXTENDED RELEASE ORAL DAILY
Qty: 30 CAPSULE | Refills: 0 | Status: SHIPPED | OUTPATIENT
Start: 2023-03-26 | End: 2023-04-25

## 2023-01-23 RX ORDER — DEXMETHYLPHENIDATE HYDROCHLORIDE 20 MG/1
20 CAPSULE, EXTENDED RELEASE ORAL DAILY
Qty: 30 CAPSULE | Refills: 0 | Status: SHIPPED | OUTPATIENT
Start: 2023-02-23 | End: 2023-03-25

## 2023-01-23 RX ORDER — DULAGLUTIDE 3 MG/.5ML
3 INJECTION, SOLUTION SUBCUTANEOUS WEEKLY
Qty: 6 ML | Refills: 1 | Status: SHIPPED | OUTPATIENT
Start: 2023-01-23

## 2023-01-23 RX ORDER — DEXMETHYLPHENIDATE HYDROCHLORIDE 20 MG/1
20 CAPSULE, EXTENDED RELEASE ORAL DAILY
Qty: 30 CAPSULE | Refills: 0 | Status: SHIPPED | OUTPATIENT
Start: 2023-01-23 | End: 2023-02-22

## 2023-03-10 ENCOUNTER — LAB ENCOUNTER (OUTPATIENT)
Dept: LAB | Age: 73
End: 2023-03-10
Attending: FAMILY MEDICINE
Payer: MEDICARE

## 2023-03-10 DIAGNOSIS — E11.9 CONTROLLED TYPE 2 DIABETES MELLITUS WITHOUT COMPLICATION, WITHOUT LONG-TERM CURRENT USE OF INSULIN (HCC): ICD-10-CM

## 2023-03-10 LAB
ALBUMIN SERPL-MCNC: 3.8 G/DL (ref 3.4–5)
ALBUMIN/GLOB SERPL: 1 {RATIO} (ref 1–2)
ALP LIVER SERPL-CCNC: 111 U/L
ALT SERPL-CCNC: 26 U/L
ANION GAP SERPL CALC-SCNC: 4 MMOL/L (ref 0–18)
AST SERPL-CCNC: 17 U/L (ref 15–37)
BILIRUB SERPL-MCNC: 0.4 MG/DL (ref 0.1–2)
BUN BLD-MCNC: 12 MG/DL (ref 7–18)
CALCIUM BLD-MCNC: 9.7 MG/DL (ref 8.5–10.1)
CHLORIDE SERPL-SCNC: 103 MMOL/L (ref 98–112)
CHOLEST SERPL-MCNC: 116 MG/DL (ref ?–200)
CO2 SERPL-SCNC: 31 MMOL/L (ref 21–32)
CREAT BLD-MCNC: 0.85 MG/DL
EST. AVERAGE GLUCOSE BLD GHB EST-MCNC: 123 MG/DL (ref 68–126)
FASTING PATIENT LIPID ANSWER: YES
FASTING STATUS PATIENT QL REPORTED: YES
GFR SERPLBLD BASED ON 1.73 SQ M-ARVRAT: 93 ML/MIN/1.73M2 (ref 60–?)
GLOBULIN PLAS-MCNC: 3.9 G/DL (ref 2.8–4.4)
GLUCOSE BLD-MCNC: 105 MG/DL (ref 70–99)
HBA1C MFR BLD: 5.9 % (ref ?–5.7)
HDLC SERPL-MCNC: 59 MG/DL (ref 40–59)
LDLC SERPL CALC-MCNC: 44 MG/DL (ref ?–100)
NONHDLC SERPL-MCNC: 57 MG/DL (ref ?–130)
OSMOLALITY SERPL CALC.SUM OF ELEC: 286 MOSM/KG (ref 275–295)
POTASSIUM SERPL-SCNC: 4.4 MMOL/L (ref 3.5–5.1)
PROT SERPL-MCNC: 7.7 G/DL (ref 6.4–8.2)
SODIUM SERPL-SCNC: 138 MMOL/L (ref 136–145)
TRIGL SERPL-MCNC: 58 MG/DL (ref 30–149)
VLDLC SERPL CALC-MCNC: 8 MG/DL (ref 0–30)

## 2023-03-10 PROCEDURE — 83036 HEMOGLOBIN GLYCOSYLATED A1C: CPT

## 2023-03-10 PROCEDURE — 80061 LIPID PANEL: CPT

## 2023-03-10 PROCEDURE — 80053 COMPREHEN METABOLIC PANEL: CPT

## 2023-03-10 PROCEDURE — 36415 COLL VENOUS BLD VENIPUNCTURE: CPT

## 2023-03-11 NOTE — PROGRESS NOTES
Hemoglobin A1c is still looking good continue with healthy diet and reduction in carbohydrates.   Normal lipid panel  Normal kidney function and liver function tests

## 2023-03-13 ENCOUNTER — OFFICE VISIT (OUTPATIENT)
Dept: FAMILY MEDICINE CLINIC | Facility: CLINIC | Age: 73
End: 2023-03-13
Payer: MEDICARE

## 2023-03-13 VITALS
SYSTOLIC BLOOD PRESSURE: 130 MMHG | WEIGHT: 191 LBS | DIASTOLIC BLOOD PRESSURE: 74 MMHG | HEART RATE: 88 BPM | HEIGHT: 65 IN | OXYGEN SATURATION: 98 % | BODY MASS INDEX: 31.82 KG/M2 | RESPIRATION RATE: 18 BRPM | TEMPERATURE: 98 F

## 2023-03-13 DIAGNOSIS — E11.9 CONTROLLED TYPE 2 DIABETES MELLITUS WITHOUT COMPLICATION, WITHOUT LONG-TERM CURRENT USE OF INSULIN (HCC): Primary | ICD-10-CM

## 2023-03-13 DIAGNOSIS — F41.1 GAD (GENERALIZED ANXIETY DISORDER): ICD-10-CM

## 2023-03-13 DIAGNOSIS — R25.1 TREMOR OF BOTH OUTSTRETCHED HANDS: ICD-10-CM

## 2023-03-13 DIAGNOSIS — F90.0 ATTENTION DEFICIT HYPERACTIVITY DISORDER (ADHD), PREDOMINANTLY INATTENTIVE TYPE: ICD-10-CM

## 2023-03-13 DIAGNOSIS — Z79.899 MEDICATION MANAGEMENT: ICD-10-CM

## 2023-03-13 PROCEDURE — 99214 OFFICE O/P EST MOD 30 MIN: CPT | Performed by: FAMILY MEDICINE

## 2023-03-13 RX ORDER — DULAGLUTIDE 3 MG/.5ML
3 INJECTION, SOLUTION SUBCUTANEOUS WEEKLY
Qty: 2 ML | Refills: 3 | Status: SHIPPED | OUTPATIENT
Start: 2023-03-13

## 2023-03-13 RX ORDER — BUPROPION HYDROCHLORIDE 150 MG/1
150 TABLET ORAL DAILY
Qty: 7 TABLET | Refills: 0 | Status: SHIPPED | OUTPATIENT
Start: 2023-03-13

## 2023-03-14 PROBLEM — R25.1 TREMOR OF BOTH OUTSTRETCHED HANDS: Status: ACTIVE | Noted: 2023-03-14

## 2023-03-30 ENCOUNTER — TELEPHONE (OUTPATIENT)
Dept: SURGERY | Facility: CLINIC | Age: 73
End: 2023-03-30

## 2023-03-30 DIAGNOSIS — C61 PROSTATE CANCER (HCC): ICD-10-CM

## 2023-03-30 DIAGNOSIS — Z12.5 PROSTATE CANCER SCREENING: Primary | ICD-10-CM

## 2023-03-30 NOTE — TELEPHONE ENCOUNTER
Patient requesting to get lab orders entered so that he can get his lab tests done before he comes in for his follow up appointment on 4/3/2023.

## 2023-04-03 ENCOUNTER — LAB ENCOUNTER (OUTPATIENT)
Dept: LAB | Age: 73
End: 2023-04-03
Attending: UROLOGY
Payer: MEDICARE

## 2023-04-03 ENCOUNTER — OFFICE VISIT (OUTPATIENT)
Dept: SURGERY | Facility: CLINIC | Age: 73
End: 2023-04-03

## 2023-04-03 DIAGNOSIS — R39.12 WEAK URINARY STREAM: Primary | ICD-10-CM

## 2023-04-03 DIAGNOSIS — R97.20 ELEVATED PSA: ICD-10-CM

## 2023-04-03 DIAGNOSIS — R39.15 URINARY URGENCY: ICD-10-CM

## 2023-04-03 DIAGNOSIS — C61 PROSTATE CANCER (HCC): ICD-10-CM

## 2023-04-03 DIAGNOSIS — N52.9 ERECTILE DYSFUNCTION, UNSPECIFIED ERECTILE DYSFUNCTION TYPE: ICD-10-CM

## 2023-04-03 LAB — PSA SERPL-MCNC: 0.26 NG/ML (ref ?–4)

## 2023-04-03 PROCEDURE — 99214 OFFICE O/P EST MOD 30 MIN: CPT | Performed by: UROLOGY

## 2023-04-03 PROCEDURE — 36415 COLL VENOUS BLD VENIPUNCTURE: CPT

## 2023-04-03 PROCEDURE — 84153 ASSAY OF PSA TOTAL: CPT

## 2023-04-03 RX ORDER — TAMSULOSIN HYDROCHLORIDE 0.4 MG/1
0.4 CAPSULE ORAL EVERY EVENING
Qty: 90 CAPSULE | Refills: 6 | Status: SHIPPED | OUTPATIENT
Start: 2023-04-03

## 2023-04-07 LAB
APPEARANCE: CLEAR
BILIRUBIN: NEGATIVE
GLUCOSE (URINE DIPSTICK): NEGATIVE MG/DL
KETONES (URINE DIPSTICK): NEGATIVE MG/DL
LEUKOCYTES: NEGATIVE
MULTISTIX LOT#: NORMAL NUMERIC
NITRITE, URINE: NEGATIVE
OCCULT BLOOD: NEGATIVE
PH, URINE: 5.5 (ref 4.5–8)
PROTEIN (URINE DIPSTICK): NEGATIVE MG/DL
SPECIFIC GRAVITY: >=1.03 (ref 1–1.03)
URINE-COLOR: YELLOW
UROBILINOGEN,SEMI-QN: 0.2 MG/DL (ref 0–1.9)

## 2023-04-18 DIAGNOSIS — E03.8 HYPOTHYROIDISM DUE TO HASHIMOTO'S THYROIDITIS: ICD-10-CM

## 2023-04-18 DIAGNOSIS — E06.3 HYPOTHYROIDISM DUE TO HASHIMOTO'S THYROIDITIS: ICD-10-CM

## 2023-04-18 RX ORDER — BUPROPION HYDROCHLORIDE 300 MG/1
TABLET ORAL
Qty: 90 TABLET | Refills: 0 | OUTPATIENT
Start: 2023-04-18

## 2023-04-18 RX ORDER — LEVOTHYROXINE SODIUM 0.05 MG/1
TABLET ORAL
Qty: 90 TABLET | Refills: 2 | Status: SHIPPED | OUTPATIENT
Start: 2023-04-18

## 2023-05-01 DIAGNOSIS — I10 ESSENTIAL HYPERTENSION WITH GOAL BLOOD PRESSURE LESS THAN 130/80: ICD-10-CM

## 2023-05-02 RX ORDER — AMLODIPINE BESYLATE 5 MG/1
TABLET ORAL
Qty: 90 TABLET | Refills: 1 | Status: SHIPPED | OUTPATIENT
Start: 2023-05-02

## 2023-05-09 ENCOUNTER — TELEPHONE (OUTPATIENT)
Dept: FAMILY MEDICINE CLINIC | Facility: CLINIC | Age: 73
End: 2023-05-09

## 2023-05-09 DIAGNOSIS — R09.81 NASAL CONGESTION: Primary | ICD-10-CM

## 2023-05-09 NOTE — TELEPHONE ENCOUNTER
Patient requesting order for Covid test. C/o mild congestion since Friday, no other symptoms. Had a negative home test yesterday, wants to have it done at lab to ensure accuracy.      Order pended

## 2023-05-22 DIAGNOSIS — F90.0 ATTENTION DEFICIT HYPERACTIVITY DISORDER (ADHD), PREDOMINANTLY INATTENTIVE TYPE: ICD-10-CM

## 2023-05-23 RX ORDER — DEXMETHYLPHENIDATE HYDROCHLORIDE 20 MG/1
20 CAPSULE, EXTENDED RELEASE ORAL DAILY
Qty: 30 CAPSULE | Refills: 0 | Status: SHIPPED | OUTPATIENT
Start: 2023-05-23 | End: 2023-06-22

## 2023-06-14 ENCOUNTER — OFFICE VISIT (OUTPATIENT)
Dept: FAMILY MEDICINE CLINIC | Facility: CLINIC | Age: 73
End: 2023-06-14
Payer: MEDICARE

## 2023-06-14 VITALS
RESPIRATION RATE: 18 BRPM | WEIGHT: 201.63 LBS | OXYGEN SATURATION: 98 % | BODY MASS INDEX: 33.59 KG/M2 | SYSTOLIC BLOOD PRESSURE: 128 MMHG | TEMPERATURE: 98 F | DIASTOLIC BLOOD PRESSURE: 76 MMHG | HEART RATE: 74 BPM | HEIGHT: 65 IN

## 2023-06-14 DIAGNOSIS — E11.9 CONTROLLED TYPE 2 DIABETES MELLITUS WITHOUT COMPLICATION, WITHOUT LONG-TERM CURRENT USE OF INSULIN (HCC): Primary | ICD-10-CM

## 2023-06-14 DIAGNOSIS — F90.0 ATTENTION DEFICIT HYPERACTIVITY DISORDER (ADHD), PREDOMINANTLY INATTENTIVE TYPE: ICD-10-CM

## 2023-06-14 DIAGNOSIS — Z79.899 MEDICATION MANAGEMENT: ICD-10-CM

## 2023-06-14 PROCEDURE — 99214 OFFICE O/P EST MOD 30 MIN: CPT | Performed by: FAMILY MEDICINE

## 2023-06-14 RX ORDER — DEXMETHYLPHENIDATE HYDROCHLORIDE 20 MG/1
20 CAPSULE, EXTENDED RELEASE ORAL DAILY
Qty: 30 CAPSULE | Refills: 0 | Status: SHIPPED | OUTPATIENT
Start: 2023-07-22 | End: 2023-08-21

## 2023-06-14 RX ORDER — DEXMETHYLPHENIDATE HYDROCHLORIDE 20 MG/1
20 CAPSULE, EXTENDED RELEASE ORAL DAILY
Qty: 30 CAPSULE | Refills: 0 | Status: SHIPPED | OUTPATIENT
Start: 2023-06-22 | End: 2023-07-22

## 2023-06-14 RX ORDER — DEXMETHYLPHENIDATE HYDROCHLORIDE 20 MG/1
20 CAPSULE, EXTENDED RELEASE ORAL DAILY
Qty: 30 CAPSULE | Refills: 0 | Status: SHIPPED | OUTPATIENT
Start: 2023-08-21 | End: 2023-09-20

## 2023-06-14 RX ORDER — DULAGLUTIDE 3 MG/.5ML
3 INJECTION, SOLUTION SUBCUTANEOUS WEEKLY
Qty: 2 ML | Refills: 3 | Status: SHIPPED | OUTPATIENT
Start: 2023-06-14

## 2023-06-14 RX ORDER — ATORVASTATIN CALCIUM 10 MG/1
10 TABLET, FILM COATED ORAL DAILY
Qty: 90 TABLET | Refills: 3 | Status: CANCELLED | OUTPATIENT
Start: 2023-06-14

## 2023-06-14 RX ORDER — METFORMIN HYDROCHLORIDE 750 MG/1
750 TABLET, EXTENDED RELEASE ORAL 2 TIMES DAILY WITH MEALS
Qty: 180 TABLET | Refills: 1 | Status: CANCELLED | OUTPATIENT
Start: 2023-06-14

## 2023-06-14 RX ORDER — DEXMETHYLPHENIDATE HYDROCHLORIDE 20 MG/1
20 CAPSULE, EXTENDED RELEASE ORAL DAILY
Qty: 30 CAPSULE | Refills: 0 | Status: CANCELLED | OUTPATIENT
Start: 2023-06-14 | End: 2023-07-14

## 2023-06-14 RX ORDER — LOSARTAN POTASSIUM 100 MG/1
100 TABLET ORAL DAILY
Qty: 90 TABLET | Refills: 1 | Status: CANCELLED | OUTPATIENT
Start: 2023-06-14

## 2023-07-17 DIAGNOSIS — E11.65 UNCONTROLLED TYPE 2 DIABETES MELLITUS WITH HYPERGLYCEMIA (HCC): ICD-10-CM

## 2023-07-17 DIAGNOSIS — I10 ESSENTIAL HYPERTENSION WITH GOAL BLOOD PRESSURE LESS THAN 130/80: ICD-10-CM

## 2023-07-18 DIAGNOSIS — F90.0 ATTENTION DEFICIT HYPERACTIVITY DISORDER (ADHD), PREDOMINANTLY INATTENTIVE TYPE: ICD-10-CM

## 2023-07-18 RX ORDER — HYDROCHLOROTHIAZIDE 25 MG/1
25 TABLET ORAL EVERY MORNING
Qty: 90 TABLET | Refills: 1 | Status: SHIPPED | OUTPATIENT
Start: 2023-07-18

## 2023-07-18 RX ORDER — METFORMIN HYDROCHLORIDE 750 MG/1
750 TABLET, EXTENDED RELEASE ORAL 2 TIMES DAILY WITH MEALS
Qty: 180 TABLET | Refills: 1 | Status: SHIPPED | OUTPATIENT
Start: 2023-07-18

## 2023-07-18 RX ORDER — LOSARTAN POTASSIUM 100 MG/1
100 TABLET ORAL DAILY
Qty: 90 TABLET | Refills: 1 | Status: SHIPPED | OUTPATIENT
Start: 2023-07-18

## 2023-07-22 RX ORDER — DEXMETHYLPHENIDATE HYDROCHLORIDE 20 MG/1
20 CAPSULE, EXTENDED RELEASE ORAL DAILY
Qty: 30 CAPSULE | Refills: 0 | Status: SHIPPED | OUTPATIENT
Start: 2023-07-22 | End: 2023-08-21

## 2023-07-26 ENCOUNTER — OFFICE VISIT (OUTPATIENT)
Dept: FAMILY MEDICINE CLINIC | Facility: CLINIC | Age: 73
End: 2023-07-26
Payer: MEDICARE

## 2023-07-26 VITALS
RESPIRATION RATE: 18 BRPM | SYSTOLIC BLOOD PRESSURE: 126 MMHG | DIASTOLIC BLOOD PRESSURE: 78 MMHG | WEIGHT: 202 LBS | HEIGHT: 65 IN | HEART RATE: 74 BPM | TEMPERATURE: 97 F | BODY MASS INDEX: 33.66 KG/M2 | OXYGEN SATURATION: 98 %

## 2023-07-26 DIAGNOSIS — E78.2 MIXED HYPERLIPIDEMIA: ICD-10-CM

## 2023-07-26 DIAGNOSIS — E11.9 CONTROLLED TYPE 2 DIABETES MELLITUS WITHOUT COMPLICATION, WITHOUT LONG-TERM CURRENT USE OF INSULIN (HCC): ICD-10-CM

## 2023-07-26 DIAGNOSIS — B35.1 FUNGAL INFECTION OF TOENAIL: ICD-10-CM

## 2023-07-26 DIAGNOSIS — Z79.899 MEDICATION MANAGEMENT: ICD-10-CM

## 2023-07-26 DIAGNOSIS — Z96.641 HISTORY OF RIGHT HIP REPLACEMENT: ICD-10-CM

## 2023-07-26 DIAGNOSIS — F41.1 GAD (GENERALIZED ANXIETY DISORDER): ICD-10-CM

## 2023-07-26 DIAGNOSIS — Z97.4 DOES USE HEARING AID: ICD-10-CM

## 2023-07-26 DIAGNOSIS — G47.33 OSA ON CPAP: ICD-10-CM

## 2023-07-26 DIAGNOSIS — G47.33 OBSTRUCTIVE SLEEP APNEA: ICD-10-CM

## 2023-07-26 DIAGNOSIS — R39.11 BENIGN PROSTATIC HYPERPLASIA WITH URINARY HESITANCY: ICD-10-CM

## 2023-07-26 DIAGNOSIS — Z13.0 SCREENING FOR DEFICIENCY ANEMIA: ICD-10-CM

## 2023-07-26 DIAGNOSIS — I67.9 CEREBROVASCULAR SMALL VESSEL DISEASE: ICD-10-CM

## 2023-07-26 DIAGNOSIS — Z00.00 ENCOUNTER FOR ANNUAL HEALTH EXAMINATION: Primary | ICD-10-CM

## 2023-07-26 DIAGNOSIS — M62.830 LUMBAR PARASPINAL MUSCLE SPASM: ICD-10-CM

## 2023-07-26 DIAGNOSIS — I83.892 VARICOSE VEINS OF LEFT LEG WITH EDEMA: ICD-10-CM

## 2023-07-26 DIAGNOSIS — Z80.0 FH: COLON CANCER IN FIRST DEGREE RELATIVE <60 YEARS OLD: ICD-10-CM

## 2023-07-26 DIAGNOSIS — I44.4 LEFT ANTERIOR FASCICULAR BLOCK (LAFB): ICD-10-CM

## 2023-07-26 DIAGNOSIS — N40.1 BENIGN PROSTATIC HYPERPLASIA WITH URINARY HESITANCY: ICD-10-CM

## 2023-07-26 DIAGNOSIS — M19.072 PRIMARY OSTEOARTHRITIS OF LEFT FOOT: ICD-10-CM

## 2023-07-26 DIAGNOSIS — Z12.11 COLON CANCER SCREENING: ICD-10-CM

## 2023-07-26 DIAGNOSIS — E03.8 HYPOTHYROIDISM DUE TO HASHIMOTO'S THYROIDITIS: ICD-10-CM

## 2023-07-26 DIAGNOSIS — I10 ESSENTIAL HYPERTENSION WITH GOAL BLOOD PRESSURE LESS THAN 130/80: ICD-10-CM

## 2023-07-26 DIAGNOSIS — F32.5 DEPRESSION, MAJOR, IN REMISSION (HCC): ICD-10-CM

## 2023-07-26 DIAGNOSIS — R94.31 ABNORMAL EKG: ICD-10-CM

## 2023-07-26 DIAGNOSIS — C61 PROSTATE CANCER (HCC): ICD-10-CM

## 2023-07-26 DIAGNOSIS — E66.9 OBESITY (BMI 30.0-34.9): ICD-10-CM

## 2023-07-26 DIAGNOSIS — R25.1 TREMOR OF BOTH OUTSTRETCHED HANDS: ICD-10-CM

## 2023-07-26 DIAGNOSIS — E06.3 HYPOTHYROIDISM DUE TO HASHIMOTO'S THYROIDITIS: ICD-10-CM

## 2023-07-26 DIAGNOSIS — F90.0 ATTENTION DEFICIT HYPERACTIVITY DISORDER (ADHD), PREDOMINANTLY INATTENTIVE TYPE: ICD-10-CM

## 2023-07-26 PROCEDURE — G0439 PPPS, SUBSEQ VISIT: HCPCS | Performed by: FAMILY MEDICINE

## 2023-07-26 PROCEDURE — 99213 OFFICE O/P EST LOW 20 MIN: CPT | Performed by: FAMILY MEDICINE

## 2023-07-26 RX ORDER — ATORVASTATIN CALCIUM 10 MG/1
10 TABLET, FILM COATED ORAL DAILY
Qty: 90 TABLET | Refills: 3 | Status: SHIPPED | OUTPATIENT
Start: 2023-07-26

## 2023-07-27 PROBLEM — I44.4 LEFT ANTERIOR FASCICULAR BLOCK (LAFB): Status: ACTIVE | Noted: 2023-07-27

## 2023-07-27 PROBLEM — E66.09 OBESITY DUE TO EXCESS CALORIES WITHOUT SERIOUS COMORBIDITY: Status: RESOLVED | Noted: 2022-11-07 | Resolved: 2023-07-27

## 2023-07-27 PROBLEM — M62.830 LUMBAR PARASPINAL MUSCLE SPASM: Status: ACTIVE | Noted: 2023-07-27

## 2023-07-27 PROBLEM — G47.33 OSA ON CPAP: Status: ACTIVE | Noted: 2023-07-27

## 2023-07-27 RX ORDER — CYCLOBENZAPRINE HCL 5 MG
5 TABLET ORAL 3 TIMES DAILY PRN
Qty: 15 TABLET | Refills: 0 | Status: SHIPPED | OUTPATIENT
Start: 2023-07-27

## 2023-09-06 ENCOUNTER — HOSPITAL ENCOUNTER (OUTPATIENT)
Dept: CT IMAGING | Age: 73
Discharge: HOME | End: 2023-09-06
Attending: FAMILY MEDICINE

## 2023-09-06 VITALS — SYSTOLIC BLOOD PRESSURE: 122 MMHG | DIASTOLIC BLOOD PRESSURE: 64 MMHG

## 2023-09-06 DIAGNOSIS — E11.9 CONTROLLED TYPE 2 DIABETES MELLITUS WITHOUT COMPLICATION, WITHOUT LONG-TERM CURRENT USE OF INSULIN (HCC): ICD-10-CM

## 2023-09-06 NOTE — PROGRESS NOTES
Date of Service 9/6/2023    Eva Harrison  Date of Birth 5/19/1950    Patient Age: 68year old    [facility]  [address] [place] [address] [facility]    Heart Scan Consult  Preliminary Heart Scan Score: 138.04    Previous Screening  Heart Scan Completed Previously: Yes  Year of last heart scan: 2013  Score of last heart scan: 13.1  Peripheral Vascular Scan Completed Previously: No          Risk Factors  Personal Risk Factors  Non-alterable Risk Factors: Personal History;Age;Gender;Family History  Alterable Risk Factors: Abnormal Cholesterol;Diabetes;Unhealthy eating        Blood Pressure     /64 (BP Location: Left arm)     (Normal =< 120/80,  Elevated = 120-129/ >80,  High Stage1 130-139/80-89 , Stage2 >140/>90)    Lipid Profile  Cholesterol: 116, done on 3/10/2023. HDL Cholesterol: 59, done on 3/10/2023. LDL Cholesterol: 44, done on 3/10/2023. TriGlycerides 58, done on 3/10/2023. Cholesterol Goals  Value   Total  =< 200   HDL  = > 45 Men = > 55 Women   LDL   =< 100   Triglycerides  =< 150       Glucose and Hemoglobin A1C  Lab Results   Component Value Date     (H) 03/10/2023    A1C 5.9 (H) 03/10/2023     (Normal Fasting Glucose < 100mg/dl )    Nurse Review  Risk factor information and results reviewed with Nurse: Yes    Recommended Follow Up:  Consult your physician regarding[de-identified] Final Heart Scan Report; Discuss potential for Incidental Finding      Recommendations for Change:  Nutrition Changes: Low Saturated Fat;Low Fat Dairy; Increase Fiber    Cholesterol Modification (goal of therapy depends upon your risk): Increase HDL (Healthy/Good) Normal >45 Men >55 Women;  Decrease LDL (Lousy/Bad) Ideal <100;  Decrease Triglycerides (Ugly) Normal <150     (Today's FASTING Cholestech Values: Total Cholesterol-133, HDL-45, LDL-67, Triglycerides-109, Glucose-103)    Exercise: Enhance Current Program                   Repeat Heart Scan:   3 Years if Calcium Score is > 0.0;   Discuss with your Physician              [place] Recommended Resources:  Recommended Resources: Upcoming Classes, Medical Services and [email or web address];    PV Screening  Recommended PV Screening: Abdomen;Carotids; Ankle-Brachial Index (PRETTY)         Graham Arenas RN        Please Contact the Nurse Heart Line with any Questions or Concerns [phone].

## 2023-09-07 NOTE — PROGRESS NOTES
Over read of noncardiac areas of heart scan are normal except for degenerative disc disease of spine and fatty liver. Treatment for a fatty liver is weight loss low saturated fat diet.

## 2023-09-12 ENCOUNTER — LAB ENCOUNTER (OUTPATIENT)
Dept: LAB | Age: 73
End: 2023-09-12
Attending: FAMILY MEDICINE
Payer: MEDICARE

## 2023-09-12 DIAGNOSIS — R25.1 TREMOR OF BOTH OUTSTRETCHED HANDS: ICD-10-CM

## 2023-09-12 DIAGNOSIS — Z13.0 SCREENING FOR DEFICIENCY ANEMIA: ICD-10-CM

## 2023-09-12 DIAGNOSIS — E06.3 HYPOTHYROIDISM DUE TO HASHIMOTO'S THYROIDITIS: ICD-10-CM

## 2023-09-12 DIAGNOSIS — E03.8 HYPOTHYROIDISM DUE TO HASHIMOTO'S THYROIDITIS: ICD-10-CM

## 2023-09-12 DIAGNOSIS — E11.9 CONTROLLED TYPE 2 DIABETES MELLITUS WITHOUT COMPLICATION, WITHOUT LONG-TERM CURRENT USE OF INSULIN (HCC): ICD-10-CM

## 2023-09-12 LAB
ALBUMIN SERPL-MCNC: 3.6 G/DL (ref 3.4–5)
ALBUMIN/GLOB SERPL: 0.9 {RATIO} (ref 1–2)
ALP LIVER SERPL-CCNC: 96 U/L
ALT SERPL-CCNC: 33 U/L
ANION GAP SERPL CALC-SCNC: 3 MMOL/L (ref 0–18)
AST SERPL-CCNC: 18 U/L (ref 15–37)
BASOPHILS # BLD AUTO: 0.05 X10(3) UL (ref 0–0.2)
BASOPHILS NFR BLD AUTO: 0.6 %
BILIRUB SERPL-MCNC: 0.4 MG/DL (ref 0.1–2)
BUN BLD-MCNC: 14 MG/DL (ref 7–18)
CALCIUM BLD-MCNC: 9 MG/DL (ref 8.5–10.1)
CHLORIDE SERPL-SCNC: 106 MMOL/L (ref 98–112)
CO2 SERPL-SCNC: 29 MMOL/L (ref 21–32)
CREAT BLD-MCNC: 0.86 MG/DL
EGFRCR SERPLBLD CKD-EPI 2021: 92 ML/MIN/1.73M2 (ref 60–?)
EOSINOPHIL # BLD AUTO: 0.38 X10(3) UL (ref 0–0.7)
EOSINOPHIL NFR BLD AUTO: 4.8 %
ERYTHROCYTE [DISTWIDTH] IN BLOOD BY AUTOMATED COUNT: 13.7 %
EST. AVERAGE GLUCOSE BLD GHB EST-MCNC: 140 MG/DL (ref 68–126)
FASTING STATUS PATIENT QL REPORTED: YES
GLOBULIN PLAS-MCNC: 3.9 G/DL (ref 2.8–4.4)
GLUCOSE BLD-MCNC: 124 MG/DL (ref 70–99)
HBA1C MFR BLD: 6.5 % (ref ?–5.7)
HCT VFR BLD AUTO: 42.6 %
HGB BLD-MCNC: 14.2 G/DL
IMM GRANULOCYTES # BLD AUTO: 0.02 X10(3) UL (ref 0–1)
IMM GRANULOCYTES NFR BLD: 0.3 %
LYMPHOCYTES # BLD AUTO: 1.86 X10(3) UL (ref 1–4)
LYMPHOCYTES NFR BLD AUTO: 23.5 %
MCH RBC QN AUTO: 28.3 PG (ref 26–34)
MCHC RBC AUTO-ENTMCNC: 33.3 G/DL (ref 31–37)
MCV RBC AUTO: 84.9 FL
MONOCYTES # BLD AUTO: 0.77 X10(3) UL (ref 0.1–1)
MONOCYTES NFR BLD AUTO: 9.7 %
NEUTROPHILS # BLD AUTO: 4.84 X10 (3) UL (ref 1.5–7.7)
NEUTROPHILS # BLD AUTO: 4.84 X10(3) UL (ref 1.5–7.7)
NEUTROPHILS NFR BLD AUTO: 61.1 %
OSMOLALITY SERPL CALC.SUM OF ELEC: 288 MOSM/KG (ref 275–295)
PLATELET # BLD AUTO: 318 10(3)UL (ref 150–450)
POTASSIUM SERPL-SCNC: 4.2 MMOL/L (ref 3.5–5.1)
PROT SERPL-MCNC: 7.5 G/DL (ref 6.4–8.2)
RBC # BLD AUTO: 5.02 X10(6)UL
SODIUM SERPL-SCNC: 138 MMOL/L (ref 136–145)
T4 FREE SERPL-MCNC: 0.8 NG/DL (ref 0.8–1.7)
TSI SER-ACNC: 2.53 MIU/ML (ref 0.36–3.74)
VIT B12 SERPL-MCNC: 872 PG/ML (ref 193–986)
WBC # BLD AUTO: 7.9 X10(3) UL (ref 4–11)

## 2023-09-12 PROCEDURE — 82607 VITAMIN B-12: CPT

## 2023-09-12 PROCEDURE — 83036 HEMOGLOBIN GLYCOSYLATED A1C: CPT

## 2023-09-12 PROCEDURE — 80053 COMPREHEN METABOLIC PANEL: CPT

## 2023-09-12 PROCEDURE — 84443 ASSAY THYROID STIM HORMONE: CPT

## 2023-09-12 PROCEDURE — 85025 COMPLETE CBC W/AUTO DIFF WBC: CPT

## 2023-09-12 PROCEDURE — 36415 COLL VENOUS BLD VENIPUNCTURE: CPT

## 2023-09-12 PROCEDURE — 84439 ASSAY OF FREE THYROXINE: CPT

## 2023-09-13 ENCOUNTER — OFFICE VISIT (OUTPATIENT)
Dept: FAMILY MEDICINE CLINIC | Facility: CLINIC | Age: 73
End: 2023-09-13
Payer: MEDICARE

## 2023-09-13 VITALS
HEIGHT: 65 IN | RESPIRATION RATE: 18 BRPM | SYSTOLIC BLOOD PRESSURE: 136 MMHG | OXYGEN SATURATION: 98 % | BODY MASS INDEX: 34.32 KG/M2 | HEART RATE: 74 BPM | WEIGHT: 206 LBS | TEMPERATURE: 97 F | DIASTOLIC BLOOD PRESSURE: 64 MMHG

## 2023-09-13 DIAGNOSIS — R26.89 BALANCE PROBLEM: ICD-10-CM

## 2023-09-13 DIAGNOSIS — R40.4: ICD-10-CM

## 2023-09-13 DIAGNOSIS — E03.8 HYPOTHYROIDISM DUE TO HASHIMOTO'S THYROIDITIS: ICD-10-CM

## 2023-09-13 DIAGNOSIS — R29.898 WEAKNESS OF RIGHT LEG: ICD-10-CM

## 2023-09-13 DIAGNOSIS — M54.50 LUMBAR PAIN: ICD-10-CM

## 2023-09-13 DIAGNOSIS — I10 ESSENTIAL HYPERTENSION WITH GOAL BLOOD PRESSURE LESS THAN 130/80: ICD-10-CM

## 2023-09-13 DIAGNOSIS — Z23 NEED FOR INFLUENZA VACCINATION: Primary | ICD-10-CM

## 2023-09-13 DIAGNOSIS — E78.2 MIXED HYPERLIPIDEMIA: ICD-10-CM

## 2023-09-13 DIAGNOSIS — Z13.0 SCREENING FOR DEFICIENCY ANEMIA: ICD-10-CM

## 2023-09-13 DIAGNOSIS — F90.0 ATTENTION DEFICIT HYPERACTIVITY DISORDER (ADHD), PREDOMINANTLY INATTENTIVE TYPE: ICD-10-CM

## 2023-09-13 DIAGNOSIS — I73.9 SMALL VESSEL DISEASE (HCC): ICD-10-CM

## 2023-09-13 DIAGNOSIS — E06.3 HYPOTHYROIDISM DUE TO HASHIMOTO'S THYROIDITIS: ICD-10-CM

## 2023-09-13 DIAGNOSIS — Z79.899 MEDICATION MANAGEMENT: ICD-10-CM

## 2023-09-13 DIAGNOSIS — E11.9 CONTROLLED TYPE 2 DIABETES MELLITUS WITHOUT COMPLICATION, WITHOUT LONG-TERM CURRENT USE OF INSULIN (HCC): ICD-10-CM

## 2023-09-13 PROCEDURE — G0008 ADMIN INFLUENZA VIRUS VAC: HCPCS | Performed by: FAMILY MEDICINE

## 2023-09-13 PROCEDURE — 90662 IIV NO PRSV INCREASED AG IM: CPT | Performed by: FAMILY MEDICINE

## 2023-09-13 PROCEDURE — 99215 OFFICE O/P EST HI 40 MIN: CPT | Performed by: FAMILY MEDICINE

## 2023-09-13 RX ORDER — DEXMETHYLPHENIDATE HYDROCHLORIDE 20 MG/1
20 CAPSULE, EXTENDED RELEASE ORAL DAILY
Qty: 30 CAPSULE | Refills: 0 | Status: SHIPPED | OUTPATIENT
Start: 2023-11-04 | End: 2023-12-04

## 2023-09-13 RX ORDER — DEXMETHYLPHENIDATE HYDROCHLORIDE 20 MG/1
20 CAPSULE, EXTENDED RELEASE ORAL DAILY
Qty: 30 CAPSULE | Refills: 0 | Status: SHIPPED | OUTPATIENT
Start: 2023-12-04 | End: 2024-01-03

## 2023-09-13 RX ORDER — CYCLOBENZAPRINE HCL 5 MG
5 TABLET ORAL 3 TIMES DAILY PRN
Qty: 15 TABLET | Refills: 0 | Status: SHIPPED | OUTPATIENT
Start: 2023-09-13

## 2023-09-13 RX ORDER — DEXMETHYLPHENIDATE HYDROCHLORIDE 20 MG/1
20 CAPSULE, EXTENDED RELEASE ORAL DAILY
Qty: 30 CAPSULE | Refills: 0 | Status: SHIPPED | OUTPATIENT
Start: 2023-10-05 | End: 2023-11-04

## 2023-09-14 ENCOUNTER — PATIENT MESSAGE (OUTPATIENT)
Dept: FAMILY MEDICINE CLINIC | Facility: CLINIC | Age: 73
End: 2023-09-14

## 2023-09-14 PROBLEM — R26.89 BALANCE PROBLEM: Status: ACTIVE | Noted: 2023-09-14

## 2023-09-14 PROBLEM — R29.898 WEAKNESS OF RIGHT LEG: Status: ACTIVE | Noted: 2023-09-14

## 2023-09-14 PROBLEM — R40.4: Status: ACTIVE | Noted: 2023-09-14

## 2023-09-14 PROBLEM — I73.9 SMALL VESSEL DISEASE (HCC): Status: ACTIVE | Noted: 2023-09-14

## 2023-09-14 PROBLEM — I73.9 SMALL VESSEL DISEASE: Status: ACTIVE | Noted: 2023-09-14

## 2023-09-14 PROBLEM — M54.50 LUMBAR PAIN: Status: ACTIVE | Noted: 2023-09-14

## 2023-09-20 ENCOUNTER — HOSPITAL ENCOUNTER (OUTPATIENT)
Dept: ULTRASOUND IMAGING | Age: 73
Discharge: HOME | End: 2023-09-20
Attending: FAMILY MEDICINE
Payer: MEDICARE

## 2023-09-20 DIAGNOSIS — R40.4: ICD-10-CM

## 2023-09-20 DIAGNOSIS — R26.89 BALANCE PROBLEM: ICD-10-CM

## 2023-09-20 DIAGNOSIS — I73.9 SMALL VESSEL DISEASE (HCC): ICD-10-CM

## 2023-09-20 PROCEDURE — 93880 EXTRACRANIAL BILAT STUDY: CPT | Performed by: FAMILY MEDICINE

## 2023-09-22 NOTE — PROGRESS NOTES
HPI:     Shu Aguirre ([facility]) is a 68year old male with a PMH of ADD, HTN, HL, DM, hypothyroid, allergies, diverticulosis, hemorroids. Following for:  1. 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  - Oncotype GPS 22 - c/w favorable intermediate risk disease, likelihood adverse pathology at time of RP is 33% with 4% and 1 % chance of mets and CaP death within 8 y following RP. 2. BPH/LUTS  - flomax 1/27/22  3. OAB/UI  - no meds  4. ED  - pt prefers observation    PCP - Nguyễn Dixon - [address] 4/3/2023    Presents for check-up. He feels well today. He is still taking flomax. Stream weakens if he stops. AUA SS is 8/35 with 2 w, u, f; 1 n, I. Happy with LUTS. Incontinence: resolved - LOV reported UI/dribbles a couple times per week. No pads. RADHA prior visit: ~ 40 g prostate, no nodules or tenderness    UA is negative    Has ED and prefers observation. Prior PSAs:  - 0.18 9/29/23  - 0.26 4/3/23  - 0.41 10/3/22  - 1.21 3/30/22  - 4.23, 4K 29% 11/5/21  - 5.13 9/16/21  - 3.31 8/25/20  - 3.41 3/6/20  - 2.64 3/5/19  - 2-3 range years prior    Drinks ~ 50-60 oz water, 20-30 tea, 20 soda with medium yellow urine. We have reviewed water/dietary irritants for OAB and he may cut back on tea/soda but urgency is not terribly bothersome at this time. He will continue flomax for weak stream. Observation for ED and mild OAB. F/u in 6 mo with PSA prior or same day.     Prior note:    Gross hematuria: none  Tobacco hx: < 4 pack years, quit 1974  Kidney stone hx: none  Fam h/o  malignancy: none    HISTORY:  Past Medical History:   Diagnosis Date    Allergic rhinitis, cause unspecified     Chronic fatigue 8/11/2015    Controlled type 2 diabetes mellitus without complication, without long-term current use of insulin ([facility] 12/29/2016    [facility]     Diverticulosis 04/25/2016 John Mo M.D. Essential hypertension     Fall 08/02/2022    Infective otitis externa, unspecified 11/04/10    Internal hemorrhoids 04/25/2016    John Mo M.D. 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     HERNIA SURGERY  01/01/08    inguinal    HERNIA SURGERY  10/10    HIP REPLACEMENT SURGERY  01/01/93    Total    KNEE ARTHROSCP HARV  11/15/10    Total; Left; Laterality    KNEE REPLACEMENT SURGERY  2011    left TKA by Dr. Bruno Mccormick  01/01/60    Leg Osteotomy Tibial    OTHER SURGICAL HISTORY      left tibial osteotomy as teen    RADIATION  01/2022    TONSILLECTOMY      and adenoidectomy as child      Family History   Problem Relation Age of Onset    Colon Cancer Father     Hypertension Mother     Dementia Mother         alzheimers    Other (hypertension) Mother     Ovarian Cancer Sister     Hypertension Maternal Grandmother     Heart Disorder Maternal Grandmother         CAD    Heart Attack Maternal Grandmother     Heart Attack Maternal Grandfather     No Known Problems Son     No Known Problems Son     No Known Problems Son       Social History:   Social History     Socioeconomic History    Marital status:    Tobacco Use    Smoking status: Former     Packs/day: 1.50     Years: 4.00     Additional pack years: 0.00     Total pack years: 6.00     Types: Cigarettes     Start date: 5/19/1968     Quit date: 5/19/1973     Years since quitting: 50.4    Smokeless tobacco: Never   Vaping Use    Vaping Use: Never used   Substance and Sexual Activity    Alcohol use: No     Alcohol/week: 0.0 standard drinks of alcohol    Drug use: No   Other Topics Concern     Service No    Blood Transfusions No    Caffeine Concern Yes     Comment: 0-1 teas daily    Occupational Exposure No    Hobby Hazards No    Sleep Concern No    Stress Concern No    Weight Concern No    Special Diet No    Back Care No    Exercise Yes    Bike Helmet No    Seat Belt Yes    Self-Exams No        Medications (Active prior to today's visit):  Current Outpatient Medications   Medication Sig Dispense Refill    tamsulosin 0.4 MG Oral Cap Take 1 capsule (0.4 mg total) by mouth every evening. Take 1/2 hour following the same meal each day 90 capsule 6    [START ON 10/5/2023] Dexmethylphenidate HCl ER (FOCALIN XR) 20 MG Oral Capsule SR 24 Hr Take 1 capsule (20 mg total) by mouth daily. 30 capsule 0    [START ON 11/4/2023] Dexmethylphenidate HCl ER (FOCALIN XR) 20 MG Oral Capsule SR 24 Hr Take 1 capsule (20 mg total) by mouth daily. 30 capsule 0    [START ON 12/4/2023] Dexmethylphenidate HCl ER (FOCALIN XR) 20 MG Oral Capsule SR 24 Hr Take 1 capsule (20 mg total) by mouth daily. 30 capsule 0    cyclobenzaprine 5 MG Oral Tab Take 1 tablet (5 mg total) by mouth 3 (three) times daily as needed for Muscle spasms (Lower back pain). 15 tablet 0    sertraline 50 MG Oral Tab Take 1 tablet (50 mg total) by mouth daily. 90 tablet 1    atorvastatin 10 MG Oral Tab Take 1 tablet (10 mg total) by mouth daily. 90 tablet 3    losartan 100 MG Oral Tab Take 1 tablet (100 mg total) by mouth daily. 90 tablet 1    hydroCHLOROthiazide 25 MG Oral Tab Take 1 tablet (25 mg total) by mouth every morning. 90 tablet 1    metFORMIN  MG Oral Tablet 24 Hr Take 1 tablet (750 mg total) by mouth 2 (two) times daily with meals.  180 tablet 1    Dulaglutide (TRULICITY) 3 IH/8.5PH Subcutaneous Solution Pen-injector Inject 3 mg into the skin once a week. 2 mL 3    AMLODIPINE 5 MG Oral Tab TAKE 1 TABLET(5 MG) BY MOUTH DAILY 90 tablet 1    LEVOTHYROXINE 50 MCG Oral Tab TAKE 1 TABLET(50 MCG) BY MOUTH BEFORE BREAKFAST 90 tablet 2    FREESTYLE LANCETS Does not apply Misc TEST ONCE DAILY AS DIRECTED 100 each 3    Glucose Blood (FREESTYLE LITE TEST) In Vitro Strip TEST ONCE DAILY AS DIRECTED 100 strip 3    lidocaine 5 % External Patch PLACE 1 PATCH ONTO THE SKIN DAILY. PUT ON FOR 12 HOURS THEN OFF FOR 12 HOURS 30 patch 5    Probiotic Product (PROBIOTIC PEARLS) Oral Cap Take 1 capsule by mouth daily. Turmeric 500 MG Oral Cap Take 1 capsule by mouth daily. ketoconazole (NIZORAL) 2 % External Cream APPLY A THIN FILM TO AFFECTED AREA(S) AS NEEDED EVERY DAY 60 g 1    Bioflavonoid Products (BIOFLEX) Oral Tab Take 1 tablet by mouth daily. Cholecalciferol (VITAMIN D3) 1000 UNITS Oral Cap Take 1 tablet by mouth daily. Cinnamon 500 MG Oral Cap Take 1 capsule by mouth 2 (two) times daily. Omega-3 Fatty Acids (FISH OIL TRIPLE STRENGTH) 1400 MG Oral Cap Take 1 capsule by mouth daily. Multiple Vitamins-Minerals (CENTRUM SILVER ULTRA MENS) Oral Tab Take 1 tablet by mouth daily. ZYRTEC 10 MG OR TABS Take 1 mg by mouth daily. Allergies:    Sulfa Antibiotics       UNKNOWN  Zestril [Lisinopril]    OTHER (SEE COMMENTS)    Comment:TABS: Cough      ROS:     A comprehensive 10 point review of systems was completed. Pertinent positives and negatives noted in the the HPI.     PHYSICAL EXAM:     GENERAL APPEARANCE: well, developed, well nourished, in no acute distress  NEUROLOGIC: nonfocal, alert and oriented  HEAD: normocephalic, atraumatic  EYES: sclera non-icteric  EARS: hearing intact  ORAL CAVITY: mucosa moist  NECK/THYROID: no obvious goiter or masses  LUNGS: nonlabored breathing  ABDOMEN: soft, no obvious masses or tenderness  SKIN: no obvious rashes    : as noted above     ASSESSMENT/PLAN:   Diagnoses and all orders for this visit:    [facility]  -     URINALYSIS, AUTO, W/O SCOPE  -     PSA Total, Diagnostic; Future    Weak urinary stream  -     tamsulosin 0.4 MG Oral Cap; Take 1 capsule (0.4 mg total) by mouth every evening. Take 1/2 hour following the same meal each day    Erectile dysfunction, unspecified erectile dysfunction type    Urinary urgency    - as noted above    Thanks again for this consult.       Gasper Zepeda MD, [facility]  Urologist  [place] [address]  Office: [phone]

## 2023-09-23 ENCOUNTER — APPOINTMENT (OUTPATIENT)
Dept: GENERAL RADIOLOGY | Age: 73
End: 2023-09-23
Payer: MEDICARE

## 2023-09-23 ENCOUNTER — HOSPITAL ENCOUNTER (EMERGENCY)
Age: 73
Discharge: HOME | End: 2023-09-23
Attending: EMERGENCY MEDICINE
Payer: MEDICARE

## 2023-09-23 VITALS
DIASTOLIC BLOOD PRESSURE: 59 MMHG | HEART RATE: 77 BPM | BODY MASS INDEX: 34.32 KG/M2 | SYSTOLIC BLOOD PRESSURE: 122 MMHG | OXYGEN SATURATION: 96 % | TEMPERATURE: 98 F | HEIGHT: 65 IN | WEIGHT: 206 LBS | RESPIRATION RATE: 18 BRPM

## 2023-09-23 DIAGNOSIS — S63.502A SPRAIN OF LEFT WRIST, INITIAL ENCOUNTER: Primary | ICD-10-CM

## 2023-09-23 PROCEDURE — 73110 X-RAY EXAM OF WRIST: CPT | Performed by: EMERGENCY MEDICINE

## 2023-09-23 PROCEDURE — 99283 EMERGENCY DEPT VISIT LOW MDM: CPT

## 2023-09-23 PROCEDURE — 99284 EMERGENCY DEPT VISIT MOD MDM: CPT

## 2023-09-23 NOTE — DISCHARGE INSTRUCTIONS
You do have some old fractures from what seems to be is from previous injuries I do not see any old new fractures but should follow-up with the orthopedic surgeon for further evaluation that may be a subtle fracture not seen on work-up. You were seen in the emergency room in a limited time. There is a possibility that although we do not see any acute process at this present time that things can change with time. Is therefore imperative that you follow-up with primary care physician for close follow-up. If there is any significant progression of your pain  or other symptoms you to return immediately to the emergency room.

## 2023-09-27 ENCOUNTER — OFFICE VISIT (OUTPATIENT)
Dept: PODIATRY CLINIC | Facility: CLINIC | Age: 73
End: 2023-09-27

## 2023-09-27 DIAGNOSIS — M20.41 HAMMER TOES OF BOTH FEET: ICD-10-CM

## 2023-09-27 DIAGNOSIS — E11.42 DIABETIC POLYNEUROPATHY ASSOCIATED WITH TYPE 2 DIABETES MELLITUS (HCC): ICD-10-CM

## 2023-09-27 DIAGNOSIS — M19.072 ARTHRITIS OF LEFT ANKLE: ICD-10-CM

## 2023-09-27 DIAGNOSIS — M20.42 HAMMER TOES OF BOTH FEET: ICD-10-CM

## 2023-09-27 DIAGNOSIS — M19.072 OSTEOARTHRITIS OF LEFT MIDFOOT: Primary | ICD-10-CM

## 2023-09-27 PROCEDURE — 99213 OFFICE O/P EST LOW 20 MIN: CPT | Performed by: STUDENT IN AN ORGANIZED HEALTH CARE EDUCATION/TRAINING PROGRAM

## 2023-09-29 ENCOUNTER — LAB ENCOUNTER (OUTPATIENT)
Dept: LAB | Age: 73
End: 2023-09-29
Attending: FAMILY MEDICINE
Payer: MEDICARE

## 2023-09-29 DIAGNOSIS — C61 PROSTATE CANCER (HCC): ICD-10-CM

## 2023-09-29 LAB — PSA SERPL-MCNC: 0.18 NG/ML (ref ?–4)

## 2023-09-29 PROCEDURE — 36415 COLL VENOUS BLD VENIPUNCTURE: CPT

## 2023-09-29 PROCEDURE — 84153 ASSAY OF PSA TOTAL: CPT

## 2023-10-02 ENCOUNTER — HOSPITAL ENCOUNTER (OUTPATIENT)
Dept: GENERAL RADIOLOGY | Age: 73
Discharge: HOME | End: 2023-10-02
Attending: FAMILY MEDICINE
Payer: MEDICARE

## 2023-10-02 ENCOUNTER — OFFICE VISIT (OUTPATIENT)
Dept: FAMILY MEDICINE CLINIC | Facility: CLINIC | Age: 73
End: 2023-10-02
Payer: MEDICARE

## 2023-10-02 ENCOUNTER — OFFICE VISIT (OUTPATIENT)
Dept: SURGERY | Facility: CLINIC | Age: 73
End: 2023-10-02

## 2023-10-02 VITALS
DIASTOLIC BLOOD PRESSURE: 64 MMHG | OXYGEN SATURATION: 95 % | TEMPERATURE: 98 F | BODY MASS INDEX: 34.8 KG/M2 | WEIGHT: 203.81 LBS | HEIGHT: 64 IN | SYSTOLIC BLOOD PRESSURE: 126 MMHG | HEART RATE: 86 BPM

## 2023-10-02 DIAGNOSIS — B35.1 TOENAIL FUNGUS: ICD-10-CM

## 2023-10-02 DIAGNOSIS — M21.41 PES PLANUS OF BOTH FEET: ICD-10-CM

## 2023-10-02 DIAGNOSIS — I83.892 VARICOSE VEINS OF LEFT LEG WITH EDEMA: ICD-10-CM

## 2023-10-02 DIAGNOSIS — L84 FOOT CALLUS: ICD-10-CM

## 2023-10-02 DIAGNOSIS — M19.072 ARTHRITIS OF FOOT, LEFT: ICD-10-CM

## 2023-10-02 DIAGNOSIS — C61 PROSTATE CANCER (HCC): Primary | ICD-10-CM

## 2023-10-02 DIAGNOSIS — M25.432 PAIN AND SWELLING OF WRIST, LEFT: ICD-10-CM

## 2023-10-02 DIAGNOSIS — N52.9 ERECTILE DYSFUNCTION, UNSPECIFIED ERECTILE DYSFUNCTION TYPE: ICD-10-CM

## 2023-10-02 DIAGNOSIS — R39.12 WEAK URINARY STREAM: ICD-10-CM

## 2023-10-02 DIAGNOSIS — R39.15 URINARY URGENCY: ICD-10-CM

## 2023-10-02 DIAGNOSIS — M21.42 PES PLANUS OF BOTH FEET: ICD-10-CM

## 2023-10-02 DIAGNOSIS — M25.532 PAIN AND SWELLING OF WRIST, LEFT: Primary | ICD-10-CM

## 2023-10-02 DIAGNOSIS — M20.42 HAMMERTOES OF BOTH FEET: ICD-10-CM

## 2023-10-02 DIAGNOSIS — M20.41 HAMMERTOES OF BOTH FEET: ICD-10-CM

## 2023-10-02 DIAGNOSIS — M25.532 PAIN AND SWELLING OF WRIST, LEFT: ICD-10-CM

## 2023-10-02 DIAGNOSIS — E11.9 CONTROLLED TYPE 2 DIABETES MELLITUS WITHOUT COMPLICATION, WITHOUT LONG-TERM CURRENT USE OF INSULIN (HCC): ICD-10-CM

## 2023-10-02 DIAGNOSIS — M25.432 PAIN AND SWELLING OF WRIST, LEFT: Primary | ICD-10-CM

## 2023-10-02 LAB
APPEARANCE: CLEAR
BILIRUBIN: NEGATIVE
GLUCOSE (URINE DIPSTICK): NEGATIVE MG/DL
KETONES (URINE DIPSTICK): NEGATIVE MG/DL
LEUKOCYTES: NEGATIVE
MULTISTIX LOT#: NORMAL NUMERIC
NITRITE, URINE: NEGATIVE
OCCULT BLOOD: NEGATIVE
PH, URINE: 5.5 (ref 4.5–8)
PROTEIN (URINE DIPSTICK): NEGATIVE MG/DL
SPECIFIC GRAVITY: 1.02 (ref 1–1.03)
URINE-COLOR: YELLOW
UROBILINOGEN,SEMI-QN: 0.2 MG/DL (ref 0–1.9)

## 2023-10-02 PROCEDURE — 81003 URINALYSIS AUTO W/O SCOPE: CPT | Performed by: UROLOGY

## 2023-10-02 PROCEDURE — 73110 X-RAY EXAM OF WRIST: CPT | Performed by: FAMILY MEDICINE

## 2023-10-02 PROCEDURE — 99214 OFFICE O/P EST MOD 30 MIN: CPT | Performed by: FAMILY MEDICINE

## 2023-10-02 PROCEDURE — 99214 OFFICE O/P EST MOD 30 MIN: CPT | Performed by: UROLOGY

## 2023-10-02 PROCEDURE — 1111F DSCHRG MED/CURRENT MED MERGE: CPT | Performed by: FAMILY MEDICINE

## 2023-10-02 RX ORDER — TAMSULOSIN HYDROCHLORIDE 0.4 MG/1
0.4 CAPSULE ORAL EVERY EVENING
Qty: 90 CAPSULE | Refills: 6 | Status: SHIPPED | OUTPATIENT
Start: 2023-10-02

## 2023-10-03 ENCOUNTER — PATIENT MESSAGE (OUTPATIENT)
Dept: FAMILY MEDICINE CLINIC | Facility: CLINIC | Age: 73
End: 2023-10-03

## 2023-10-03 DIAGNOSIS — R29.898 WEAKNESS OF RIGHT LEG: ICD-10-CM

## 2023-10-03 DIAGNOSIS — Z96.641 HISTORY OF RIGHT HIP REPLACEMENT: Primary | ICD-10-CM

## 2023-10-03 PROBLEM — M20.41 HAMMERTOES OF BOTH FEET: Status: ACTIVE | Noted: 2023-10-03

## 2023-10-03 PROBLEM — M19.072 ARTHRITIS OF FOOT, LEFT: Status: ACTIVE | Noted: 2022-08-23

## 2023-10-03 PROBLEM — L84 FOOT CALLUS: Status: ACTIVE | Noted: 2020-03-03

## 2023-10-03 PROBLEM — M21.40 FLAT FOOT: Status: ACTIVE | Noted: 2023-10-03

## 2023-10-03 PROBLEM — M20.42 HAMMERTOES OF BOTH FEET: Status: ACTIVE | Noted: 2023-10-03

## 2023-10-03 NOTE — TELEPHONE ENCOUNTER
From: Jose Leonardo  To: Mayi Pedroza  Sent: 10/3/2023 10:46 AM CDT  Subject: Hand    Made appointment for Grover Adame on 10/9. Evidently Dr Rod Young is not with [facility] for y hip. Can you refer an ortho dr to check my hip.      Thanks Parag Lacy

## 2023-10-03 NOTE — PROGRESS NOTES
No acute fracture present in the scaphoid bone. Refer you to a hand specialist to determine if a MRI is necessary. Continue with trying rest, ice and a compression splint.   Javier Gandara MD  hand surgeon  [id number] [facility]   [phone]

## 2023-10-05 ENCOUNTER — HOSPITAL ENCOUNTER (OUTPATIENT)
Dept: MRI IMAGING | Age: 73
Discharge: HOME | End: 2023-10-05
Attending: FAMILY MEDICINE
Payer: MEDICARE

## 2023-10-05 DIAGNOSIS — R26.89 BALANCE PROBLEM: ICD-10-CM

## 2023-10-05 DIAGNOSIS — M54.50 LUMBAR PAIN: ICD-10-CM

## 2023-10-05 DIAGNOSIS — R29.898 WEAKNESS OF RIGHT LEG: ICD-10-CM

## 2023-10-05 PROCEDURE — 72148 MRI LUMBAR SPINE W/O DYE: CPT | Performed by: FAMILY MEDICINE

## 2023-10-06 ENCOUNTER — HOSPITAL ENCOUNTER (OUTPATIENT)
Dept: MRI IMAGING | Age: 73
Discharge: HOME | End: 2023-10-06
Attending: FAMILY MEDICINE
Payer: MEDICARE

## 2023-10-06 DIAGNOSIS — I73.9 SMALL VESSEL DISEASE (HCC): ICD-10-CM

## 2023-10-06 DIAGNOSIS — R29.898 WEAKNESS OF RIGHT LEG: ICD-10-CM

## 2023-10-06 DIAGNOSIS — R26.89 BALANCE PROBLEM: ICD-10-CM

## 2023-10-06 DIAGNOSIS — R40.4: ICD-10-CM

## 2023-10-06 PROCEDURE — 70553 MRI BRAIN STEM W/O & W/DYE: CPT | Performed by: FAMILY MEDICINE

## 2023-10-06 PROCEDURE — A9575 INJ GADOTERATE MEGLUMI 0.1ML: HCPCS

## 2023-10-06 RX ORDER — GADOTERATE MEGLUMINE 376.9 MG/ML
15 INJECTION INTRAVENOUS
Status: COMPLETED | OUTPATIENT
Start: 2023-10-06 | End: 2023-10-06

## 2023-10-06 RX ADMIN — GADOTERATE MEGLUMINE 30 ML: 376.9 INJECTION INTRAVENOUS at 20:11:00

## 2023-10-07 NOTE — PROGRESS NOTES
You do have evidence of spinal canal stenosis and neural foraminal stenosis with disc bulging associated with degenerative osteoarthritic changes. There is a disc bulge at the L5-S1 which is pressing on the S1 nerve root and this can cause sciatica. You can discuss this with orthopedic specialist and the neurologist though again you would benefit from physical therapy.   Let me know if you are interested in physical therapy referral and I will place a referral.

## 2023-10-08 NOTE — PROGRESS NOTES
MRI of the brain normal except for mild chronic microvascular ischemia treatment is diabetes control, hypertension control and cholesterol treatment. No intracranial hemorrhage, infarct or mass.

## 2023-10-09 ENCOUNTER — OFFICE VISIT (OUTPATIENT)
Dept: ORTHOPEDICS CLINIC | Facility: CLINIC | Age: 73
End: 2023-10-09
Payer: MEDICARE

## 2023-10-09 VITALS — HEIGHT: 65 IN | BODY MASS INDEX: 33.82 KG/M2 | WEIGHT: 203 LBS

## 2023-10-09 DIAGNOSIS — M18.10 ARTHRITIS OF CARPOMETACARPAL (CMC) JOINT OF THUMB: ICD-10-CM

## 2023-10-09 DIAGNOSIS — M65.4 TENOSYNOVITIS OF RADIAL STYLOID: Primary | ICD-10-CM

## 2023-10-09 PROCEDURE — 99204 OFFICE O/P NEW MOD 45 MIN: CPT | Performed by: ORTHOPAEDIC SURGERY

## 2023-10-09 RX ORDER — MELOXICAM 15 MG/1
15 TABLET ORAL DAILY
Qty: 30 TABLET | Refills: 0 | Status: SHIPPED | OUTPATIENT
Start: 2023-10-09

## 2023-10-12 DIAGNOSIS — I10 ESSENTIAL HYPERTENSION WITH GOAL BLOOD PRESSURE LESS THAN 130/80: ICD-10-CM

## 2023-10-13 RX ORDER — AMLODIPINE BESYLATE 5 MG/1
TABLET ORAL
Qty: 90 TABLET | Refills: 1 | Status: SHIPPED | OUTPATIENT
Start: 2023-10-13

## 2023-10-13 NOTE — TELEPHONE ENCOUNTER
Medication(s) to Refill:   Requested Prescriptions     Pending Prescriptions Disp Refills    AMLODIPINE 5 MG Oral Tab [Pharmacy Med Name: AMLODIPINE BESYLATE 5MG TABLETS] 90 tablet 1     Sig: TAKE 1 TABLET(5 MG) BY MOUTH DAILY     Last Time Medication was Filled:  5/2/23    Recent Visits  Date Type Provider Dept   10/02/23 Office Visit [facility] [address]     Future Appointments  Date Type Provider Dept   12/18/23 Appointment [facility] [address]

## 2023-11-01 NOTE — PROGRESS NOTES
Dx: Right hip pain  Acute right-sided low back pain without sciatica             Insurance (Authorized # of Visits):  Medicare           Authorizing Physician: Dr. Stevie Leiva  Next MD visit: none scheduled  Fall Risk: standard         Precautions: n/a this visit and progress independently. Patient/Family/Caregiver was advised of these findings, precautions, and treatment options and has agreed to actively participate in planning and for this course of care.     Thank you for your referral. If you hav -   SB TA standing 20x 3s hold  SB TA standing 20x 3s hold - - -   - - Multifidi punches RTB 10x ea - Multifidi punches RTB 20x ea Multifidi punches RTB 20x ea   Shuttle DLP 5c 20x, right LE 4c 15x Shuttle DLP 5c 20x, right LE 5c 15x SB wall mini squat 2x1 PAST MEDICAL HISTORY:  Gestational hypertension, third trimester     History of postpartum hemorrhage, currently pregnant

## 2023-11-14 ENCOUNTER — TELEPHONE (OUTPATIENT)
Facility: CLINIC | Age: 73
End: 2023-11-14

## 2023-11-14 DIAGNOSIS — M25.551 RIGHT HIP PAIN: Primary | ICD-10-CM

## 2023-11-15 ENCOUNTER — OFFICE VISIT (OUTPATIENT)
Dept: ORTHOPEDICS CLINIC | Facility: CLINIC | Age: 73
End: 2023-11-15
Payer: MEDICARE

## 2023-11-15 ENCOUNTER — HOSPITAL ENCOUNTER (OUTPATIENT)
Dept: GENERAL RADIOLOGY | Age: 73
Discharge: HOME | End: 2023-11-15
Attending: ORTHOPAEDIC SURGERY
Payer: MEDICARE

## 2023-11-15 VITALS — HEIGHT: 66 IN | WEIGHT: 208.63 LBS | BODY MASS INDEX: 33.53 KG/M2

## 2023-11-15 DIAGNOSIS — Z96.641 STATUS POST RIGHT HIP REPLACEMENT: Primary | ICD-10-CM

## 2023-11-15 DIAGNOSIS — M25.551 RIGHT HIP PAIN: ICD-10-CM

## 2023-11-15 PROCEDURE — 99213 OFFICE O/P EST LOW 20 MIN: CPT | Performed by: ORTHOPAEDIC SURGERY

## 2023-11-15 PROCEDURE — 73502 X-RAY EXAM HIP UNI 2-3 VIEWS: CPT | Performed by: ORTHOPAEDIC SURGERY

## 2023-11-21 ENCOUNTER — OFFICE VISIT (OUTPATIENT)
Facility: CLINIC | Age: 73
End: 2023-11-21
Payer: MEDICARE

## 2023-11-21 VITALS
WEIGHT: 212 LBS | HEIGHT: 66 IN | OXYGEN SATURATION: 97 % | BODY MASS INDEX: 34.07 KG/M2 | HEART RATE: 68 BPM | RESPIRATION RATE: 18 BRPM | SYSTOLIC BLOOD PRESSURE: 128 MMHG | DIASTOLIC BLOOD PRESSURE: 72 MMHG

## 2023-11-21 DIAGNOSIS — G47.33 OBSTRUCTIVE SLEEP APNEA: Primary | ICD-10-CM

## 2023-11-21 DIAGNOSIS — C61 PROSTATE CANCER (HCC): ICD-10-CM

## 2023-11-21 DIAGNOSIS — G47.10 HYPERSOMNIA: ICD-10-CM

## 2023-11-21 PROCEDURE — 99214 OFFICE O/P EST MOD 30 MIN: CPT | Performed by: OTHER

## 2023-11-30 ENCOUNTER — OFFICE VISIT (OUTPATIENT)
Dept: PODIATRY CLINIC | Facility: CLINIC | Age: 73
End: 2023-11-30

## 2023-11-30 DIAGNOSIS — M20.41 HAMMER TOES OF BOTH FEET: ICD-10-CM

## 2023-11-30 DIAGNOSIS — M19.072 OSTEOARTHRITIS OF LEFT MIDFOOT: ICD-10-CM

## 2023-11-30 DIAGNOSIS — M19.072 ARTHRITIS OF LEFT ANKLE: ICD-10-CM

## 2023-11-30 DIAGNOSIS — E11.42 DIABETIC POLYNEUROPATHY ASSOCIATED WITH TYPE 2 DIABETES MELLITUS (HCC): Primary | ICD-10-CM

## 2023-11-30 DIAGNOSIS — B35.1 ONYCHOMYCOSIS: ICD-10-CM

## 2023-11-30 DIAGNOSIS — M20.42 HAMMER TOES OF BOTH FEET: ICD-10-CM

## 2023-11-30 DIAGNOSIS — M79.672 LEFT FOOT PAIN: ICD-10-CM

## 2023-11-30 PROCEDURE — 99213 OFFICE O/P EST LOW 20 MIN: CPT | Performed by: STUDENT IN AN ORGANIZED HEALTH CARE EDUCATION/TRAINING PROGRAM

## 2023-11-30 NOTE — PROGRESS NOTES
[facility] Podiatry  Progress Note    Alonso Cary is a 68year old male. Chief Complaint   Patient presents with    Toenail Care     Nail care and foot check. HPI:     Patient is a pleasant 79-year-old male who presents to clinic for diabetic foot exam.  He has elongated and thickened nails he sometimes has difficulty trimming on his own. He does have valgus deformity with some arthritic changes to his left foot and ankle. He is in the process of acquiring new diabetic shoes and inserts. His last hemoglobin A1c was 6.5% in 9/12. No other complaints are mentioned. Past medical history, medications, and allergies reviewed. Allergies: Sulfa antibiotics and Zestril [lisinopril]   Current Outpatient Medications   Medication Sig Dispense Refill    AMLODIPINE 5 MG Oral Tab TAKE 1 TABLET(5 MG) BY MOUTH DAILY 90 tablet 1    Meloxicam 15 MG Oral Tab Take 1 tablet (15 mg total) by mouth daily. Take once daily for 4 weeks 30 tablet 0    tamsulosin 0.4 MG Oral Cap Take 1 capsule (0.4 mg total) by mouth every evening. Take 1/2 hour following the same meal each day 90 capsule 6    Dexmethylphenidate HCl ER (FOCALIN XR) 20 MG Oral Capsule SR 24 Hr Take 1 capsule (20 mg total) by mouth daily. 30 capsule 0    [START ON 12/4/2023] Dexmethylphenidate HCl ER (FOCALIN XR) 20 MG Oral Capsule SR 24 Hr Take 1 capsule (20 mg total) by mouth daily. 30 capsule 0    cyclobenzaprine 5 MG Oral Tab Take 1 tablet (5 mg total) by mouth 3 (three) times daily as needed for Muscle spasms (Lower back pain). 15 tablet 0    sertraline 50 MG Oral Tab Take 1 tablet (50 mg total) by mouth daily. 90 tablet 1    atorvastatin 10 MG Oral Tab Take 1 tablet (10 mg total) by mouth daily. 90 tablet 3    losartan 100 MG Oral Tab Take 1 tablet (100 mg total) by mouth daily. 90 tablet 1    hydroCHLOROthiazide 25 MG Oral Tab Take 1 tablet (25 mg total) by mouth every morning.  90 tablet 1    metFORMIN  MG Oral Tablet 24 Hr Take 1 tablet (750 mg total) by mouth 2 (two) times daily with meals. 180 tablet 1    Dulaglutide (TRULICITY) 3 VE/4.4MQ Subcutaneous Solution Pen-injector Inject 3 mg into the skin once a week. 2 mL 3    LEVOTHYROXINE 50 MCG Oral Tab TAKE 1 TABLET(50 MCG) BY MOUTH BEFORE BREAKFAST 90 tablet 2    FREESTYLE LANCETS Does not apply Misc TEST ONCE DAILY AS DIRECTED 100 each 3    Glucose Blood (FREESTYLE LITE TEST) In Vitro Strip TEST ONCE DAILY AS DIRECTED 100 strip 3    lidocaine 5 % External Patch PLACE 1 PATCH ONTO THE SKIN DAILY. PUT ON FOR 12 HOURS THEN OFF FOR 12 HOURS 30 patch 5    Probiotic Product (PROBIOTIC PEARLS) Oral Cap Take 1 capsule by mouth daily. Turmeric 500 MG Oral Cap Take 1 capsule by mouth daily. ketoconazole (NIZORAL) 2 % External Cream APPLY A THIN FILM TO AFFECTED AREA(S) AS NEEDED EVERY DAY 60 g 1    Bioflavonoid Products (BIOFLEX) Oral Tab Take 1 tablet by mouth daily. Cholecalciferol (VITAMIN D3) 1000 UNITS Oral Cap Take 1 tablet by mouth daily. Cinnamon 500 MG Oral Cap Take 1 capsule by mouth 2 (two) times daily. Omega-3 Fatty Acids (FISH OIL TRIPLE STRENGTH) 1400 MG Oral Cap Take 1 capsule by mouth daily. Multiple Vitamins-Minerals (CENTRUM SILVER ULTRA MENS) Oral Tab Take 1 tablet by mouth daily. ZYRTEC 10 MG OR TABS Take 1 mg by mouth daily. Past Medical History:   Diagnosis Date    Allergic rhinitis, cause unspecified     Chronic fatigue 8/11/2015    Controlled type 2 diabetes mellitus without complication, without long-term current use of insulin ([address] 12/29/2016    Diabetes ([facility] [address]     Diverticulosis 04/25/2016    Natalie Gonzalez M.D. Essential hypertension     Fall 08/02/2022    Infective otitis externa, unspecified 11/04/10    Internal hemorrhoids 04/25/2016    Natalie Gonzalez M.D.     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     HERNIA SURGERY  08    inguinal    HERNIA SURGERY  10/10    HIP REPLACEMENT SURGERY  89    Total    KNEE ARTHROSCP HARV  11/15/10    Total; Left; Laterality    KNEE REPLACEMENT SURGERY      left TKA by Dr. Reed Sites  61    Leg Osteotomy Tibial    OTHER SURGICAL HISTORY      left tibial osteotomy as teen    RADIATION  2022    TONSILLECTOMY      and adenoidectomy as child      Family History   Problem Relation Age of Onset    Colon Cancer Father     Hypertension Mother     Dementia Mother         alzheimers    Other (hypertension) Mother     Ovarian Cancer Sister     Hypertension Maternal Grandmother     Heart Disorder Maternal Grandmother         CAD    Heart Attack Maternal Grandmother     Heart Attack Maternal Grandfather     No Known Problems Son     No Known Problems Son     No Known Problems Son       Social History     Socioeconomic History    Marital status:    Tobacco Use    Smoking status: Former     Packs/day: 1.50     Years: 4.00     Additional pack years: 0.00     Total pack years: 6.00     Types: Cigarettes     Start date: 1968     Quit date: 1973     Years since quittin.5    Smokeless tobacco: Never   Vaping Use    Vaping Use: Never used   Substance and Sexual Activity    Alcohol use: No     Alcohol/week: 0.0 standard drinks of alcohol    Drug use: No   Other Topics Concern     Service No    Blood Transfusions No    Caffeine Concern Yes     Comment: 0-1 teas daily    Occupational Exposure No    Hobby Hazards No    Sleep Concern No    Stress Concern No    Weight Concern No    Special Diet No    Back Care No    Exercise Yes    Bike Helmet No    Seat Belt Yes    Self-Exams No           REVIEW OF SYSTEMS:     Today reviewed systems as documented below  GENERAL HEALTH: feels well otherwise  SKIN: denies any unusual skin lesions or rashes  RESPIRATORY: denies shortness of breath with exertion  CARDIOVASCULAR: denies chest pain on exertion  GI: denies abdominal pain and denies heartburn  NEURO: denies headaches  MUSCULO: Acknowledges arthritis      EXAM:   There were no vitals taken for this visit. GENERAL: well developed, well nourished, in no apparent distress  EXTREMITIES:   1. Integument: Normal skin temperature and turgor. Nails x 10 are elongated and thickened. 2. Vascular: Dorsalis pedis two out of four bilateral and posterior tibial pulses two out of   four bilateral, capillary refill normal.   3. Musculoskeletal: All muscle groups are graded 5 out of 5 in the foot and ankle. Decreased medial arch height noted. Mild pain on palpation noted to talonavicular joint of left foot. Pain on palpation noted to ankle joint of the left foot. Valgus deformity noted to left foot and ankle. No strength deficits or pain to posterior tibial tendon or TA tendon. Compartments are soft and compressible. 4. Neurological: Normal sharp dull sensation; reflexes normal.      XR HIP W OR WO PELVIS 2 OR 3 VIEWS, RIGHT (CPT=73502)    Result Date: 11/15/2023  CONCLUSION:    Stable appearance right hip arthroplasty, with femoral head component located slightly more superior of center in relation to the acetabular cup, this appearance is unchanged since 2019. No developing acetabulum protrusio, bone fracture, metal fracture or sign of disruption of the components.    LOCATION:  [facility]   Dictated by (CST): Ruslan Evans MD on 11/15/2023 at 9:28 AM     Finalized by (CST): Ruslan Evans MD on 11/15/2023 at 9:29 AM ASSESSMENT AND PLAN:   Diagnoses and all orders for this visit:    Diabetic polyneuropathy associated with type 2 diabetes mellitus ([facility] [address]    Osteoarthritis of left midfoot    Arthritis of left ankle    Hammer toes of both feet    Left foot pain    Onychomycosis        Plan:     -Patient examined, chart history reviewed. -Discussed etiology of patient's condition and various treatment options.  -Acquire new shoes/inserts/[place] brace as prescribed.  -Sharply debrided nails x10 with nail nipper without incident.  -Discussed importance of proper shoe gear, pedal hygiene, tight glucose control.  -Can follow-up in 2 to 3 months for routine foot care or sooner if any other concerns arise. All questions were answered to satisfaction. The patient indicates understanding of these issues and agrees to the plan.       Anitha Lawrence DPM

## 2023-12-19 ENCOUNTER — OFFICE VISIT (OUTPATIENT)
Dept: NEUROLOGY | Facility: CLINIC | Age: 73
End: 2023-12-19
Payer: MEDICARE

## 2023-12-19 VITALS — HEART RATE: 70 BPM | RESPIRATION RATE: 16 BRPM | SYSTOLIC BLOOD PRESSURE: 126 MMHG | DIASTOLIC BLOOD PRESSURE: 68 MMHG

## 2023-12-19 DIAGNOSIS — R29.898 RIGHT LEG WEAKNESS: Primary | ICD-10-CM

## 2023-12-19 PROBLEM — G25.0 BENIGN ESSENTIAL TREMOR: Status: ACTIVE | Noted: 2023-12-19

## 2023-12-19 PROBLEM — L84 PRE-ULCERATIVE CALLUSES: Status: ACTIVE | Noted: 2020-03-03

## 2023-12-19 PROCEDURE — 99204 OFFICE O/P NEW MOD 45 MIN: CPT | Performed by: OTHER
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
## 2023-12-27 ENCOUNTER — LAB ENCOUNTER (OUTPATIENT)
Dept: LAB | Age: 73
End: 2023-12-27
Attending: FAMILY MEDICINE
Payer: MEDICARE

## 2024-01-05 ENCOUNTER — NURSE ONLY (OUTPATIENT)
Dept: ELECTROPHYSIOLOGY | Facility: HOSPITAL | Age: 74
End: 2024-01-05
Attending: Other
Payer: MEDICARE

## 2024-01-05 DIAGNOSIS — R29.898 RIGHT LEG WEAKNESS: ICD-10-CM

## 2024-01-05 PROCEDURE — 95816 EEG AWAKE AND DROWSY: CPT | Performed by: OTHER
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
## 2024-01-10 DIAGNOSIS — I10 ESSENTIAL HYPERTENSION WITH GOAL BLOOD PRESSURE LESS THAN 130/80: ICD-10-CM

## 2024-01-10 DIAGNOSIS — E06.3 HYPOTHYROIDISM DUE TO HASHIMOTO'S THYROIDITIS: ICD-10-CM

## 2024-01-10 DIAGNOSIS — E11.65 UNCONTROLLED TYPE 2 DIABETES MELLITUS WITH HYPERGLYCEMIA (HCC): ICD-10-CM

## 2024-01-10 DIAGNOSIS — F41.1 GAD (GENERALIZED ANXIETY DISORDER): ICD-10-CM

## 2024-01-10 DIAGNOSIS — E03.8 HYPOTHYROIDISM DUE TO HASHIMOTO'S THYROIDITIS: ICD-10-CM

## 2024-01-10 RX ORDER — METFORMIN HYDROCHLORIDE 750 MG/1
750 TABLET, EXTENDED RELEASE ORAL 2 TIMES DAILY WITH MEALS
Qty: 180 TABLET | Refills: 1 | Status: SHIPPED | OUTPATIENT
Start: 2024-01-10

## 2024-01-10 RX ORDER — LEVOTHYROXINE SODIUM 0.05 MG/1
TABLET ORAL
Qty: 90 TABLET | Refills: 2 | Status: SHIPPED | OUTPATIENT
Start: 2024-01-10

## 2024-01-10 RX ORDER — LOSARTAN POTASSIUM 100 MG/1
100 TABLET ORAL DAILY
Qty: 90 TABLET | Refills: 1 | Status: SHIPPED | OUTPATIENT
Start: 2024-01-10

## 2024-01-10 RX ORDER — HYDROCHLOROTHIAZIDE 25 MG/1
25 TABLET ORAL EVERY MORNING
Qty: 90 TABLET | Refills: 1 | Status: SHIPPED | OUTPATIENT
Start: 2024-01-10

## 2024-01-31 ENCOUNTER — TELEPHONE (OUTPATIENT)
Dept: FAMILY MEDICINE CLINIC | Facility: CLINIC | Age: 74
End: 2024-01-31

## 2024-01-31 ENCOUNTER — OFFICE VISIT (OUTPATIENT)
Dept: PODIATRY CLINIC | Facility: CLINIC | Age: 74
End: 2024-01-31

## 2024-01-31 DIAGNOSIS — M19.072 ARTHRITIS OF LEFT ANKLE: ICD-10-CM

## 2024-01-31 DIAGNOSIS — M19.072 OSTEOARTHRITIS OF LEFT MIDFOOT: Primary | ICD-10-CM

## 2024-01-31 DIAGNOSIS — E11.42 DIABETIC POLYNEUROPATHY ASSOCIATED WITH TYPE 2 DIABETES MELLITUS (HCC): ICD-10-CM

## 2024-01-31 DIAGNOSIS — M20.42 HAMMER TOES OF BOTH FEET: ICD-10-CM

## 2024-01-31 DIAGNOSIS — M20.41 HAMMER TOES OF BOTH FEET: ICD-10-CM

## 2024-01-31 PROCEDURE — 99213 OFFICE O/P EST LOW 20 MIN: CPT | Performed by: STUDENT IN AN ORGANIZED HEALTH CARE EDUCATION/TRAINING PROGRAM

## 2024-01-31 NOTE — PROGRESS NOTES
[facility]  Progress Note    Yifan Leonardo is a 73 year old male.   Chief Complaint   Patient presents with    Diabetic Foot Care     Nail care and foot check. Orthotic prescription.         HPI:     Patient is a pleasant 73-year-old male who presents to clinic for diabetic foot exam.  He has elongated and thickened nails he sometimes has difficulty trimming on his own.  He does have valgus deformity with some arthritic changes to his left foot and ankle.  He is in the process of acquiring new [place] brace and needs shoes and inserts to accommodate for this.  His last hemoglobin A1c was 6.5% in 9/12.  His blood sugars were 114 mg/dL when checked this morning.  No other complaints are mentioned.  Past medical history, medications, and allergies reviewed.      Allergies: Sulfa antibiotics and Zestril [lisinopril]   Current Outpatient Medications   Medication Sig Dispense Refill    LEVOTHYROXINE 50 MCG Oral Tab TAKE 1 TABLET(50 MCG) BY MOUTH BEFORE BREAKFAST 90 tablet 2    LOSARTAN 100 MG Oral Tab TAKE 1 TABLET(100 MG) BY MOUTH DAILY 90 tablet 1    HYDROCHLOROTHIAZIDE 25 MG Oral Tab TAKE 1 TABLET(25 MG) BY MOUTH EVERY MORNING 90 tablet 1    METFORMIN  MG Oral Tablet 24 Hr TAKE 1 TABLET(750 MG) BY MOUTH TWICE DAILY WITH MEALS 180 tablet 1    SERTRALINE 50 MG Oral Tab TAKE 1 TABLET(50 MG) BY MOUTH DAILY 90 tablet 1    Dexmethylphenidate HCl ER (FOCALIN XR) 20 MG Oral Capsule SR 24 Hr Take 1 capsule (20 mg total) by mouth daily. 30 capsule 0    [START ON 2/25/2024] Dexmethylphenidate HCl ER (FOCALIN XR) 20 MG Oral Capsule SR 24 Hr Take 1 capsule (20 mg total) by mouth daily. 30 capsule 0    Dulaglutide (TRULICITY) 3 MG/0.5ML Subcutaneous Solution Pen-injector Inject 3 mg into the skin once a week. 2 mL 3    AMLODIPINE 5 MG Oral Tab TAKE 1 TABLET(5 MG) BY MOUTH DAILY 90 tablet 1    Meloxicam 15 MG Oral Tab Take 1 tablet (15 mg total) by mouth daily. Take once daily for 4 weeks 30 tablet 0     tamsulosin 0.4 MG Oral Cap Take 1 capsule (0.4 mg total) by mouth every evening. Take 1/2 hour following the same meal each day 90 capsule 6    cyclobenzaprine 5 MG Oral Tab Take 1 tablet (5 mg total) by mouth 3 (three) times daily as needed for Muscle spasms (Lower back pain). 15 tablet 0    atorvastatin 10 MG Oral Tab Take 1 tablet (10 mg total) by mouth daily. 90 tablet 3    FREESTYLE LANCETS Does not apply Misc TEST ONCE DAILY AS DIRECTED 100 each 3    Glucose Blood (FREESTYLE LITE TEST) In Vitro Strip TEST ONCE DAILY AS DIRECTED 100 strip 3    lidocaine 5 % External Patch PLACE 1 PATCH ONTO THE SKIN DAILY. PUT ON FOR 12 HOURS THEN OFF FOR 12 HOURS 30 patch 5    Probiotic Product (PROBIOTIC PEARLS) Oral Cap Take 1 capsule by mouth daily.      Turmeric 500 MG Oral Cap Take 1 capsule by mouth daily.      ketoconazole (NIZORAL) 2 % External Cream APPLY A THIN FILM TO AFFECTED AREA(S) AS NEEDED EVERY DAY 60 g 1    Bioflavonoid Products (BIOFLEX) Oral Tab Take 1 tablet by mouth daily.      Cholecalciferol (VITAMIN D3) 1000 UNITS Oral Cap Take 1 tablet by mouth daily.      Cinnamon 500 MG Oral Cap Take 1 capsule by mouth 2 (two) times daily.        Omega-3 Fatty Acids (FISH OIL TRIPLE STRENGTH) 1400 MG Oral Cap Take 1 capsule by mouth daily.      Multiple Vitamins-Minerals (CENTRUM SILVER ULTRA MENS) Oral Tab Take 1 tablet by mouth daily.      ZYRTEC 10 MG OR TABS Take 1 mg by mouth daily.        Past Medical History:   Diagnosis Date    Allergic rhinitis, cause unspecified     Chronic fatigue 8/11/2015    Controlled type 2 diabetes mellitus without complication, without long-term current use of insulin (HCC) 12/29/2016    Diabetes ([facility])     Diverticulosis 04/25/2016    Dwight Torrez M.D.    Essential hypertension     Fall 08/02/2022    Infective otitis externa, unspecified 11/04/10    Internal hemorrhoids 04/25/2016    Dwight Torrez M.D.    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    HERNIA SURGERY  08    inguinal    HERNIA SURGERY  10/10    HIP REPLACEMENT SURGERY  93    Total    KNEE ARTHROSCP HARV  11/15/10    Total; Left; Laterality    KNEE REPLACEMENT SURGERY      left TKA by Dr. Barlow    LEG/ANKLE SURGERY PROC UNLISTED  61    Leg Osteotomy Tibial    OTHER SURGICAL HISTORY      left tibial osteotomy as teen    RADIATION  2022    TONSILLECTOMY      and adenoidectomy as child      Family History   Problem Relation Age of Onset    Colon Cancer Father     Hypertension Mother     Dementia Mother         alzheimers    Other (hypertension) Mother     Ovarian Cancer Sister     Hypertension Maternal Grandmother     Heart Disorder Maternal Grandmother         CAD    Heart Attack Maternal Grandmother     Heart Attack Maternal Grandfather     No Known Problems Son     No Known Problems Son     No Known Problems Son       Social History     Socioeconomic History    Marital status:    Tobacco Use    Smoking status: Former     Packs/day: 1.50     Years: 4.00     Additional pack years: 0.00     Total pack years: 6.00     Types: Cigarettes     Start date: 1968     Quit date: 1973     Years since quittin.7    Smokeless tobacco: Never   Vaping Use    Vaping Use: Never used   Substance and Sexual Activity    Alcohol use: No     Alcohol/week: 0.0 standard drinks of alcohol    Drug use: No   Other Topics Concern     Service No    Blood Transfusions No    Caffeine  Concern No     Comment: 0-1 teas daily    Occupational Exposure No    Hobby Hazards No    Sleep Concern No    Stress Concern No    Weight Concern No    Special Diet No    Back Care No    Exercise No     Comment: gym but has been on hold with holidays    Bike Helmet No    Seat Belt Yes    Self-Exams No           REVIEW OF SYSTEMS:     Today reviewed systems as documented below  GENERAL HEALTH: feels well otherwise  SKIN: denies any unusual skin lesions or rashes  RESPIRATORY: denies shortness of breath with exertion  CARDIOVASCULAR: denies chest pain on exertion  GI: denies abdominal pain and denies heartburn  NEURO: denies headaches  MUSCULO: Acknowledges arthritis      EXAM:   There were no vitals taken for this visit.  GENERAL: well developed, well nourished, in no apparent distress  EXTREMITIES:   1. Integument: Normal skin temperature and turgor. Nails x 10 are elongated and thickened.  2. Vascular: Dorsalis pedis two out of four bilateral and posterior tibial pulses two out of   four bilateral, capillary refill normal.   3. Musculoskeletal: All muscle groups are graded 5 out of 5 in the foot and ankle.  Decreased medial arch height noted.  Mild pain on palpation noted to talonavicular joint of left foot.  Pain on palpation noted to ankle joint of the left foot.  Valgus deformity noted to left foot and ankle.  No strength deficits or pain to posterior tibial tendon or TA tendon.  Compartments are soft and compressible.   4. Neurological: Normal sharp dull sensation; reflexes normal.      No results found.        ASSESSMENT AND PLAN:   Diagnoses and all orders for this visit:    Osteoarthritis of left midfoot  -     DME - EXTERNAL     Arthritis of left ankle  -     DME - EXTERNAL     Hammer toes of both feet  -     DME - EXTERNAL     Diabetic polyneuropathy associated with type 2 diabetes mellitus (HCC)  -     DME - EXTERNAL           Plan:     -Patient examined, chart history reviewed.  -Discussed etiology of  patient's condition and various treatment options.  -Acquire new shoes/inserts/[place] brace as prescribed.  Referral placed for shoes and inserts to accommodate [place] brace.  -Sharply debrided nails x10 with nail nipper without incident.  Nails smoothed with Dremel.  -Discussed importance of proper shoe gear, pedal hygiene, tight glucose control.  -Can follow-up in 2 to 3 months for routine foot care or sooner if any other concerns arise.    All questions were answered to satisfaction.    The patient indicates understanding of these issues and agrees to the plan.      Sheldon Michael DPM

## 2024-02-02 ENCOUNTER — TELEPHONE (OUTPATIENT)
Dept: FAMILY MEDICINE CLINIC | Facility: CLINIC | Age: 74
End: 2024-02-02

## 2024-02-02 DIAGNOSIS — E11.9 CONTROLLED TYPE 2 DIABETES MELLITUS WITHOUT COMPLICATION, WITHOUT LONG-TERM CURRENT USE OF INSULIN (HCC): ICD-10-CM

## 2024-02-02 RX ORDER — BLOOD-GLUCOSE METER
KIT MISCELLANEOUS
Qty: 100 STRIP | Refills: 3 | Status: SHIPPED | OUTPATIENT
Start: 2024-02-02

## 2024-02-02 RX ORDER — LANCETS 28 GAUGE
EACH MISCELLANEOUS
Qty: 100 EACH | Refills: 3 | Status: SHIPPED | OUTPATIENT
Start: 2024-02-02

## 2024-02-02 NOTE — TELEPHONE ENCOUNTER
Yifan Leonardo requesting medication refill for freestyle lancets and test strips .    Patient Contacted Pharmacy (Yes/No):NO   LOV: 12/18/2023   Last Refill Date: 11/1/2022  30 or 90 Day Supply:  Pharmacy Location: [id number] [address] [facility] [place] [address] , [phone], [phone] [[id number]]   Prescribed By: Robel   Next Scheduled Appointment: 3/18/2024    Informed patient to allow up to 24 to 48 Business hours for a call back from a nurse.      Patient also states that pharmacy does not have Dexmethylphenidate HCl ER (FOCALIN XR) 20 MG, but pharmacy does have 30 MG.     Please advise

## 2024-02-02 NOTE — TELEPHONE ENCOUNTER
Patient also states that pharmacy does not have Dexmethylphenidate HCl ER (FOCALIN XR) 20 MG, but pharmacy does have 30 MG.      Please advise     Refilled:  Requested Prescriptions     Signed Prescriptions Disp Refills    FreeStyle Lancets Does not apply Misc 100 each 3     Sig: TEST ONCE DAILY AS DIRECTED     Authorizing Provider: STACY RANDHAWA     Ordering User: HYUN THOMAS    Glucose Blood (FREESTYLE LITE TEST) In Vitro Strip 100 strip 3     Sig: TEST ONCE DAILY AS DIRECTED     Authorizing Provider: STACY RANDHAWA     Ordering User: HYUN THOMAS   Refilled per protocol/OV notes

## 2024-02-14 ENCOUNTER — TELEPHONE (OUTPATIENT)
Dept: FAMILY MEDICINE CLINIC | Facility: CLINIC | Age: 74
End: 2024-02-14

## 2024-02-27 ENCOUNTER — TELEPHONE (OUTPATIENT)
Dept: FAMILY MEDICINE CLINIC | Facility: CLINIC | Age: 74
End: 2024-02-27

## 2024-02-27 NOTE — TELEPHONE ENCOUNTER
Shanell from [facility] requesting update on office notes from PA showing showing patient is diabetic.   Please fax to [phone]

## 2024-02-28 ENCOUNTER — OFFICE VISIT (OUTPATIENT)
Facility: LOCATION | Age: 74
End: 2024-02-28
Payer: MEDICARE

## 2024-02-28 DIAGNOSIS — H90.3 SENSORINEURAL HEARING LOSS (SNHL) OF BOTH EARS: Primary | ICD-10-CM

## 2024-02-28 DIAGNOSIS — H93.293 ABNORMAL AUDITORY PERCEPTION OF BOTH EARS: Primary | ICD-10-CM

## 2024-02-28 DIAGNOSIS — H61.23 BILATERAL IMPACTED CERUMEN: ICD-10-CM

## 2024-02-28 PROCEDURE — 92557 COMPREHENSIVE HEARING TEST: CPT | Performed by: AUDIOLOGIST

## 2024-02-28 PROCEDURE — 92567 TYMPANOMETRY: CPT | Performed by: AUDIOLOGIST

## 2024-02-28 PROCEDURE — 92504 EAR MICROSCOPY EXAMINATION: CPT | Performed by: OTOLARYNGOLOGY

## 2024-02-28 PROCEDURE — 99214 OFFICE O/P EST MOD 30 MIN: CPT | Performed by: OTOLARYNGOLOGY

## 2024-02-28 NOTE — PROGRESS NOTES
Yifan Leonardo is a 73 year old male.   Chief Complaint   Patient presents with    Cerumen Impaction     HPI:   73-year-old white male longstanding patient has wax impaction wears hearing aids had noted left ear not hearing as well.  Denies otorrhea otalgia vertigo.  Current Outpatient Medications   Medication Sig Dispense Refill    methylphenidate ER (CONCERTA) 27 MG Oral Tab CR Take 1 tablet (27 mg total) by mouth every morning. 30 tablet 0    FreeStyle Lancets Does not apply Misc TEST ONCE DAILY AS DIRECTED 100 each 3    Glucose Blood (FREESTYLE LITE TEST) In Vitro Strip TEST ONCE DAILY AS DIRECTED 100 strip 3    LEVOTHYROXINE 50 MCG Oral Tab TAKE 1 TABLET(50 MCG) BY MOUTH BEFORE BREAKFAST 90 tablet 2    LOSARTAN 100 MG Oral Tab TAKE 1 TABLET(100 MG) BY MOUTH DAILY 90 tablet 1    HYDROCHLOROTHIAZIDE 25 MG Oral Tab TAKE 1 TABLET(25 MG) BY MOUTH EVERY MORNING 90 tablet 1    METFORMIN  MG Oral Tablet 24 Hr TAKE 1 TABLET(750 MG) BY MOUTH TWICE DAILY WITH MEALS 180 tablet 1    SERTRALINE 50 MG Oral Tab TAKE 1 TABLET(50 MG) BY MOUTH DAILY 90 tablet 1    Dulaglutide (TRULICITY) 3 MG/0.5ML Subcutaneous Solution Pen-injector Inject 3 mg into the skin once a week. 2 mL 3    AMLODIPINE 5 MG Oral Tab TAKE 1 TABLET(5 MG) BY MOUTH DAILY 90 tablet 1    Meloxicam 15 MG Oral Tab Take 1 tablet (15 mg total) by mouth daily. Take once daily for 4 weeks 30 tablet 0    tamsulosin 0.4 MG Oral Cap Take 1 capsule (0.4 mg total) by mouth every evening. Take 1/2 hour following the same meal each day 90 capsule 6    cyclobenzaprine 5 MG Oral Tab Take 1 tablet (5 mg total) by mouth 3 (three) times daily as needed for Muscle spasms (Lower back pain). 15 tablet 0    atorvastatin 10 MG Oral Tab Take 1 tablet (10 mg total) by mouth daily. 90 tablet 3    lidocaine 5 % External Patch PLACE 1 PATCH ONTO THE SKIN DAILY. PUT ON FOR 12 HOURS THEN OFF FOR 12 HOURS 30 patch 5    Probiotic Product (PROBIOTIC PEARLS) Oral Cap Take 1  capsule by mouth daily.      Turmeric 500 MG Oral Cap Take 1 capsule by mouth daily.      ketoconazole (NIZORAL) 2 % External Cream APPLY A THIN FILM TO AFFECTED AREA(S) AS NEEDED EVERY DAY 60 g 1    Bioflavonoid Products (BIOFLEX) Oral Tab Take 1 tablet by mouth daily.      Cholecalciferol (VITAMIN D3) 1000 UNITS Oral Cap Take 1 tablet by mouth daily.      Cinnamon 500 MG Oral Cap Take 1 capsule by mouth 2 (two) times daily.        Omega-3 Fatty Acids (FISH OIL TRIPLE STRENGTH) 1400 MG Oral Cap Take 1 capsule by mouth daily.      Multiple Vitamins-Minerals (CENTRUM SILVER ULTRA MENS) Oral Tab Take 1 tablet by mouth daily.      ZYRTEC 10 MG OR TABS Take 1 mg by mouth daily.        Past Medical History:   Diagnosis Date    Allergic rhinitis, cause unspecified     Chronic fatigue 2015    Controlled type 2 diabetes mellitus without complication, without long-term current use of insulin ([facility]) 2016    Diabetes ([facility])     Diverticulosis 2016    Dwight Torrez M.D.    Essential hypertension     Fall 2022    Infective otitis externa, unspecified 11/04/10    Internal hemorrhoids 2016    Dwight Torrez M.D.    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    Obesity     Overweight(278.02)     Pre-ulcerative corn or callous 3/3/2020    Preoperative examination, unspecified 10/28/10    Prostate cancer (HCC) 2021    Psychosexual dysfunction with inhibited sexual excitement     Sleep apnea     Uncontrolled type 2 diabetes mellitus with hyperglycemia ([facility]) 2016      Social History:  Social History     Socioeconomic History    Marital status:    Tobacco Use    Smoking status: Former     Packs/day: 1.50     Years: 4.00     Additional pack years: 0.00     Total pack years: 6.00     Types: Cigarettes     Start date: 1968     Quit date: 1973     Years since quittin.8    Smokeless tobacco: Never   Vaping Use    Vaping Use: Never used   Substance and  Sexual Activity    Alcohol use: No     Alcohol/week: 0.0 standard drinks of alcohol    Drug use: No   Other Topics Concern     Service No    Blood Transfusions No    Caffeine Concern No     Comment: 0-1 teas daily    Occupational Exposure No    Hobby Hazards No    Sleep Concern No    Stress Concern No    Weight Concern No    Special Diet No    Back Care No    Exercise No     Comment: gym but has been on hold with holidays    Bike Helmet No    Seat Belt Yes    Self-Exams No      Past Surgical History:   Procedure Laterality Date    COLONOSCOPY  01/01/05    complete    COLONOSCOPY      COLONOSCOPY,DIAGNOSTIC  04/25/2016    Due 04/25/2026; Dwight Torrez M.D.    HERNIA SURGERY  01/01/08    inguinal    HERNIA SURGERY  10/10    HIP REPLACEMENT SURGERY  01/01/93    Total    KNEE ARTHROSCP HARV  11/15/10    Total; Left; Laterality    KNEE REPLACEMENT SURGERY  2011    left TKA by Dr. Barlow    LEG/ANKLE SURGERY PROC UNLISTED  01/01/60    Leg Osteotomy Tibial    OTHER SURGICAL HISTORY      left tibial osteotomy as teen    RADIATION  01/2022    TONSILLECTOMY      and adenoidectomy as child         REVIEW OF SYSTEMS:   GENERAL HEALTH: feels well otherwise  GENERAL : denies fever, chills, sweats, weight loss, weight gain  SKIN: denies any unusual skin lesions or rashes  RESPIRATORY: denies shortness of breath with exertion  NEURO: denies headaches    EXAM:   There were no vitals taken for this visit.    System Pertinent findings Details   Constitutional  Overall appearance - Normal.   Head/Face  Facial features -- Normal. Skull - Normal.   Eyes  Pupils equal ,round ,react to light and accomidate   Ears  External Ear Right: Normal, Left: Normal. Canal - Right: Normal, Left: Normal. TM - Right: Wax removed TM normal left: X removed TM normal   Nose  External Nose, Normal, Septum -midline,Nasal Vault, clear. Turbinates - Right: Normal left: Normal   Mouth/Throat  Lips/teeth/gums - Normal. Tonsils -0+ oropharynx - Normal.    Neck Exam  Inspection - Normal. Palpation - Normal. Parotid gland - Normal. Thyroid gland -normal   Lymph Detail  Submental. Submandibular. Anterior cervical. Posterior cervical. Supraclavicular.   Patients exam showed wax impaction right ear, wax impaction left ear. Ear wax was removed under the operative microscope. No complications. Patient tolerated the procedure well. Ear exam findings listed in note after removal.    Audiogram shows stable sensorineural hearing loss worse in the right ear his left hearing aid was plugged with cerumen once this was altered everything was okay    ASSESSMENT AND PLAN:   1. Sensorineural hearing loss (SNHL) of both ears  Stable follow-up as needed    2. Bilateral impacted cerumen  Stable follow-up as needed      The patient indicates understanding of these issues and agrees to the plan.      Arsen Faustin MD  2/28/2024  2:42 PM

## 2024-02-28 NOTE — PROGRESS NOTES
Yifan Leonardo was seen for audiometric evaluation today.  Referred back to physician.     Wei Mosher

## 2024-03-15 ENCOUNTER — LAB ENCOUNTER (OUTPATIENT)
Dept: LAB | Age: 74
End: 2024-03-15
Attending: UROLOGY
Payer: MEDICARE

## 2024-03-15 DIAGNOSIS — E11.9 CONTROLLED TYPE 2 DIABETES MELLITUS WITHOUT COMPLICATION, WITHOUT LONG-TERM CURRENT USE OF INSULIN (HCC): ICD-10-CM

## 2024-03-15 DIAGNOSIS — Z13.0 SCREENING FOR DEFICIENCY ANEMIA: ICD-10-CM

## 2024-03-15 DIAGNOSIS — E78.2 MIXED HYPERLIPIDEMIA: ICD-10-CM

## 2024-03-15 DIAGNOSIS — E03.8 HYPOTHYROIDISM DUE TO HASHIMOTO'S THYROIDITIS: ICD-10-CM

## 2024-03-15 DIAGNOSIS — I10 ESSENTIAL HYPERTENSION WITH GOAL BLOOD PRESSURE LESS THAN 130/80: ICD-10-CM

## 2024-03-15 DIAGNOSIS — E06.3 HYPOTHYROIDISM DUE TO HASHIMOTO'S THYROIDITIS: ICD-10-CM

## 2024-03-15 LAB
ALBUMIN SERPL-MCNC: 3.7 G/DL (ref 3.4–5)
ALBUMIN/GLOB SERPL: 0.9 {RATIO} (ref 1–2)
ALP LIVER SERPL-CCNC: 107 U/L
ALT SERPL-CCNC: 25 U/L
ANION GAP SERPL CALC-SCNC: 6 MMOL/L (ref 0–18)
AST SERPL-CCNC: 16 U/L (ref 15–37)
BASOPHILS # BLD AUTO: 0.06 X10(3) UL (ref 0–0.2)
BASOPHILS NFR BLD AUTO: 0.6 %
BILIRUB SERPL-MCNC: 0.5 MG/DL (ref 0.1–2)
BUN BLD-MCNC: 14 MG/DL (ref 9–23)
CALCIUM BLD-MCNC: 9.6 MG/DL (ref 8.5–10.1)
CHLORIDE SERPL-SCNC: 105 MMOL/L (ref 98–112)
CHOLEST SERPL-MCNC: 126 MG/DL (ref ?–200)
CO2 SERPL-SCNC: 25 MMOL/L (ref 21–32)
CREAT BLD-MCNC: 0.93 MG/DL
CREAT UR-SCNC: 184 MG/DL
EGFRCR SERPLBLD CKD-EPI 2021: 87 ML/MIN/1.73M2 (ref 60–?)
EOSINOPHIL # BLD AUTO: 0.67 X10(3) UL (ref 0–0.7)
EOSINOPHIL NFR BLD AUTO: 6.4 %
ERYTHROCYTE [DISTWIDTH] IN BLOOD BY AUTOMATED COUNT: 13.8 %
EST. AVERAGE GLUCOSE BLD GHB EST-MCNC: 157 MG/DL (ref 68–126)
FASTING PATIENT LIPID ANSWER: YES
FASTING STATUS PATIENT QL REPORTED: YES
GLOBULIN PLAS-MCNC: 4.2 G/DL (ref 2.8–4.4)
GLUCOSE BLD-MCNC: 146 MG/DL (ref 70–99)
HBA1C MFR BLD: 7.1 % (ref ?–5.7)
HCT VFR BLD AUTO: 44.6 %
HDLC SERPL-MCNC: 52 MG/DL (ref 40–59)
HGB BLD-MCNC: 14.8 G/DL
IMM GRANULOCYTES # BLD AUTO: 0.04 X10(3) UL (ref 0–1)
IMM GRANULOCYTES NFR BLD: 0.4 %
LDLC SERPL CALC-MCNC: 52 MG/DL (ref ?–100)
LYMPHOCYTES # BLD AUTO: 2.71 X10(3) UL (ref 1–4)
LYMPHOCYTES NFR BLD AUTO: 26 %
MCH RBC QN AUTO: 28.7 PG (ref 26–34)
MCHC RBC AUTO-ENTMCNC: 33.2 G/DL (ref 31–37)
MCV RBC AUTO: 86.6 FL
MICROALBUMIN UR-MCNC: 3.06 MG/DL
MICROALBUMIN/CREAT 24H UR-RTO: 16.6 UG/MG (ref ?–30)
MONOCYTES # BLD AUTO: 0.75 X10(3) UL (ref 0.1–1)
MONOCYTES NFR BLD AUTO: 7.2 %
NEUTROPHILS # BLD AUTO: 6.2 X10 (3) UL (ref 1.5–7.7)
NEUTROPHILS # BLD AUTO: 6.2 X10(3) UL (ref 1.5–7.7)
NEUTROPHILS NFR BLD AUTO: 59.4 %
NONHDLC SERPL-MCNC: 74 MG/DL (ref ?–130)
OSMOLALITY SERPL CALC.SUM OF ELEC: 285 MOSM/KG (ref 275–295)
PLATELET # BLD AUTO: 392 10(3)UL (ref 150–450)
POTASSIUM SERPL-SCNC: 4 MMOL/L (ref 3.5–5.1)
PROT SERPL-MCNC: 7.9 G/DL (ref 6.4–8.2)
RBC # BLD AUTO: 5.15 X10(6)UL
SODIUM SERPL-SCNC: 136 MMOL/L (ref 136–145)
T4 FREE SERPL-MCNC: 1 NG/DL (ref 0.8–1.7)
TRIGL SERPL-MCNC: 125 MG/DL (ref 30–149)
TSI SER-ACNC: 3.29 MIU/ML (ref 0.36–3.74)
VLDLC SERPL CALC-MCNC: 18 MG/DL (ref 0–30)
WBC # BLD AUTO: 10.4 X10(3) UL (ref 4–11)

## 2024-03-15 PROCEDURE — 85025 COMPLETE CBC W/AUTO DIFF WBC: CPT

## 2024-03-15 PROCEDURE — 84443 ASSAY THYROID STIM HORMONE: CPT

## 2024-03-15 PROCEDURE — 36415 COLL VENOUS BLD VENIPUNCTURE: CPT

## 2024-03-15 PROCEDURE — 80061 LIPID PANEL: CPT

## 2024-03-15 PROCEDURE — 84439 ASSAY OF FREE THYROXINE: CPT

## 2024-03-15 PROCEDURE — 82043 UR ALBUMIN QUANTITATIVE: CPT

## 2024-03-15 PROCEDURE — 80053 COMPREHEN METABOLIC PANEL: CPT

## 2024-03-15 PROCEDURE — 82570 ASSAY OF URINE CREATININE: CPT

## 2024-03-15 PROCEDURE — 83036 HEMOGLOBIN GLYCOSYLATED A1C: CPT

## 2024-03-15 NOTE — TELEPHONE ENCOUNTER
Requested Prescriptions     Pending Prescriptions Disp Refills   • buPROPion HCl ER, XL, 300 MG Oral Tablet 24 Hr 90 tablet 1     Sig: Take 1 tablet (300 mg total) by mouth daily.      Last fill was 9/22/20  Last oV 1/6/21  Future OV 4/7/21
Patient/Caregiver provided printed discharge information.

## 2024-03-16 NOTE — PROGRESS NOTES
Results to be discussed 3/18/2024  Normal white and red blood cell counts.  Normal kidney and liver function testing.  Elevated  blood sugar testing elevated hemoglobin A1C @ 7   Normal lipid testing   Normal thyroid testing  Normal urine microalbumin creatinine ratio.  Sincerely,   Yandy Huston PA-C

## 2024-03-18 ENCOUNTER — OFFICE VISIT (OUTPATIENT)
Dept: FAMILY MEDICINE CLINIC | Facility: CLINIC | Age: 74
End: 2024-03-18
Payer: MEDICARE

## 2024-03-18 VITALS
TEMPERATURE: 97 F | OXYGEN SATURATION: 94 % | DIASTOLIC BLOOD PRESSURE: 68 MMHG | SYSTOLIC BLOOD PRESSURE: 130 MMHG | HEIGHT: 66 IN | HEART RATE: 84 BPM | RESPIRATION RATE: 18 BRPM | BODY MASS INDEX: 32.68 KG/M2 | WEIGHT: 203.38 LBS

## 2024-03-18 DIAGNOSIS — E11.65 UNCONTROLLED TYPE 2 DIABETES MELLITUS WITH HYPERGLYCEMIA (HCC): Primary | ICD-10-CM

## 2024-03-18 DIAGNOSIS — Z79.899 MEDICATION MANAGEMENT: ICD-10-CM

## 2024-03-18 DIAGNOSIS — F90.0 ATTENTION DEFICIT HYPERACTIVITY DISORDER (ADHD), PREDOMINANTLY INATTENTIVE TYPE: ICD-10-CM

## 2024-03-18 PROCEDURE — 99215 OFFICE O/P EST HI 40 MIN: CPT | Performed by: FAMILY MEDICINE

## 2024-03-18 RX ORDER — METHYLPHENIDATE HYDROCHLORIDE 27 MG/1
27 TABLET ORAL DAILY
Qty: 30 TABLET | Refills: 0 | Status: SHIPPED | OUTPATIENT
Start: 2024-03-18 | End: 2024-04-17

## 2024-03-18 RX ORDER — METHYLPHENIDATE HYDROCHLORIDE 27 MG/1
27 TABLET ORAL DAILY
Qty: 30 TABLET | Refills: 0 | Status: SHIPPED | OUTPATIENT
Start: 2024-04-18 | End: 2024-05-18

## 2024-03-18 RX ORDER — DULAGLUTIDE 3 MG/.5ML
3 INJECTION, SOLUTION SUBCUTANEOUS WEEKLY
Qty: 2 ML | Refills: 0 | Status: SHIPPED | OUTPATIENT
Start: 2024-03-18

## 2024-03-18 RX ORDER — METHYLPHENIDATE HYDROCHLORIDE 27 MG/1
27 TABLET ORAL DAILY
Qty: 30 TABLET | Refills: 0 | Status: SHIPPED | OUTPATIENT
Start: 2024-05-19 | End: 2024-06-18

## 2024-03-19 ENCOUNTER — OFFICE VISIT (OUTPATIENT)
Dept: NEUROLOGY | Facility: CLINIC | Age: 74
End: 2024-03-19
Payer: MEDICARE

## 2024-03-19 VITALS
WEIGHT: 202 LBS | HEART RATE: 84 BPM | BODY MASS INDEX: 33 KG/M2 | RESPIRATION RATE: 16 BRPM | SYSTOLIC BLOOD PRESSURE: 132 MMHG | DIASTOLIC BLOOD PRESSURE: 72 MMHG

## 2024-03-19 DIAGNOSIS — M51.36 LUMBAR DEGENERATIVE DISC DISEASE: ICD-10-CM

## 2024-03-19 DIAGNOSIS — R29.898 RIGHT LEG WEAKNESS: Primary | ICD-10-CM

## 2024-03-19 PROBLEM — M51.369 LUMBAR DEGENERATIVE DISC DISEASE: Status: ACTIVE | Noted: 2024-03-19

## 2024-03-19 PROCEDURE — 99214 OFFICE O/P EST MOD 30 MIN: CPT | Performed by: OTHER
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
## 2024-03-19 NOTE — PROGRESS NOTES
Yifan Leonardo is a 73 year old male.  HPI:   Uncontrolled type 2 diabetes mellitus with hyperglycemia (HCC)  Patient states that he has been off of Trulicity 3 mg weekly for the past 3 weeks secondary to cost.  Would like a prescription sent to [facility] to see if it would be cheaper.    Since being off the Trulicity hemoglobin A1c went from 6.5 up to 7.0    Takes metformin extended release 750 mg twice a day well-tolerated no side effects no diarrhea willing to go up to 1000 mg twice a day    Last eye exam 9/19/2023 normal    3/15/2024 CBC normal, glucose 146, CMP otherwise normal with a GFR of 87, lipid panel normal, urine microalbumin creatinine ratio normal, hemoglobin A1c 7.1, TSH normal    No family history or personal history of medullary thyroid cancer no FH MEN,  no personal history or family history of pancreatitis.  No present GERD symptoms or bowel movement issues.    Feet no numbness no tingling sees podiatrist every 60 days    Exercise only with physical therapy right now is doing a stepper that she does when she sits walking 2 blocks a day planning on going to the gym 3 times a week    Diet mostly home-cooked food limiting carbohydrates mostly fruit does not like vegetables but overall is eating healthy occasionally eats out    Attention deficit hyperactivity disorder (ADHD), predominantly inattentive type  He denies any side effects including no chest pain, shortness breath, dizziness, swelling hands and feet, tics,  sleep disturbance or appetite suppression. Mood has been good with no signs of anxiety or depression.  Taking Concerta 27 mg since he was unable to get Focalin 20 mg XR takes 1 daily states that he has been better with his attention span and memory as well and has energy with the Concerta 27 mg.  Does notice a very slight tremor in left second finger tremor has not changed has follow-up with neurologist   States the tremor is mild and does not affect him.  ADHD is better with  his attention starting tasks, staying on task and finishing tasks with the Concerta 27 mg  Gait is improved and balance is better is working with [facility] is more confident on his feet no recurrent episodes of gait difficulty    Follow-up with neurologist tomorrow for tremors neurologist does not feel tremor is from Concerta.    Current Outpatient Medications   Medication Sig Dispense Refill    metFORMIN HCl 1000 MG Oral Tab Take 1 tablet (1,000 mg total) by mouth 2 (two) times daily with meals. 180 tablet 1    Dulaglutide (TRULICITY) 3 MG/0.5ML Subcutaneous Solution Pen-injector Inject 3 mg into the skin once a week. 2 mL 0    methylphenidate ER (CONCERTA) 27 MG Oral Tab CR Take 1 tablet (27 mg total) by mouth daily. 30 tablet 0    [START ON 4/18/2024] methylphenidate ER (CONCERTA) 27 MG Oral Tab CR Take 1 tablet (27 mg total) by mouth daily. 30 tablet 0    [START ON 5/19/2024] methylphenidate ER (CONCERTA) 27 MG Oral Tab CR Take 1 tablet (27 mg total) by mouth daily. 30 tablet 0    FreeStyle Lancets Does not apply Misc TEST ONCE DAILY AS DIRECTED 100 each 3    Glucose Blood (FREESTYLE LITE TEST) In Vitro Strip TEST ONCE DAILY AS DIRECTED 100 strip 3    LEVOTHYROXINE 50 MCG Oral Tab TAKE 1 TABLET(50 MCG) BY MOUTH BEFORE BREAKFAST 90 tablet 2    LOSARTAN 100 MG Oral Tab TAKE 1 TABLET(100 MG) BY MOUTH DAILY 90 tablet 1    HYDROCHLOROTHIAZIDE 25 MG Oral Tab TAKE 1 TABLET(25 MG) BY MOUTH EVERY MORNING 90 tablet 1    SERTRALINE 50 MG Oral Tab TAKE 1 TABLET(50 MG) BY MOUTH DAILY 90 tablet 1    AMLODIPINE 5 MG Oral Tab TAKE 1 TABLET(5 MG) BY MOUTH DAILY 90 tablet 1    Meloxicam 15 MG Oral Tab Take 1 tablet (15 mg total) by mouth daily. Take once daily for 4 weeks 30 tablet 0    tamsulosin 0.4 MG Oral Cap Take 1 capsule (0.4 mg total) by mouth every evening. Take 1/2 hour following the same meal each day 90 capsule 6    atorvastatin 10 MG Oral Tab Take 1 tablet (10 mg total) by mouth daily. 90 tablet 3    lidocaine 5 %  External Patch PLACE 1 PATCH ONTO THE SKIN DAILY. PUT ON FOR 12 HOURS THEN OFF FOR 12 HOURS 30 patch 5    Probiotic Product (PROBIOTIC PEARLS) Oral Cap Take 1 capsule by mouth daily.      Turmeric 500 MG Oral Cap Take 1 capsule by mouth daily.      ketoconazole (NIZORAL) 2 % External Cream APPLY A THIN FILM TO AFFECTED AREA(S) AS NEEDED EVERY DAY 60 g 1    Bioflavonoid Products (BIOFLEX) Oral Tab Take 1 tablet by mouth daily.      Cholecalciferol (VITAMIN D3) 1000 UNITS Oral Cap Take 1 tablet by mouth daily.      Cinnamon 500 MG Oral Cap Take 1 capsule by mouth 2 (two) times daily.        Omega-3 Fatty Acids (FISH OIL TRIPLE STRENGTH) 1400 MG Oral Cap Take 1 capsule by mouth daily.      Multiple Vitamins-Minerals (CENTRUM SILVER ULTRA MENS) Oral Tab Take 1 tablet by mouth daily.      ZYRTEC 10 MG OR TABS Take 1 mg by mouth daily.      cyclobenzaprine 5 MG Oral Tab Take 1 tablet (5 mg total) by mouth 3 (three) times daily as needed for Muscle spasms (Lower back pain). (Patient not taking: Reported on 3/18/2024) 15 tablet 0      Past Medical History:   Diagnosis Date    Allergic rhinitis, cause unspecified     Chronic fatigue 8/11/2015    Controlled type 2 diabetes mellitus without complication, without long-term current use of insulin ([facility]) 12/29/2016    Diabetes ([facility])     Diverticulosis 04/25/2016    Dwight Torrez M.D.    Essential hypertension     Fall 08/02/2022    Infective otitis externa, unspecified 11/04/10    Internal hemorrhoids 04/25/2016    Dwight Torrez M.D.    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    Obesity     Overweight(278.02)     Pre-ulcerative corn or callous 3/3/2020    Preoperative examination, unspecified 10/28/10    Prostate cancer (HCC) 12/2021    Psychosexual dysfunction with inhibited sexual excitement     Sleep apnea     Uncontrolled type 2 diabetes mellitus with hyperglycemia ([facility]) 1/28/2016      Social History:  Social History     Socioeconomic History     Marital status:    Tobacco Use    Smoking status: Former     Packs/day: 1.50     Years: 4.00     Additional pack years: 0.00     Total pack years: 6.00     Types: Cigarettes     Start date: 1968     Quit date: 1973     Years since quittin.8    Smokeless tobacco: Never   Vaping Use    Vaping Use: Never used   Substance and Sexual Activity    Alcohol use: No     Alcohol/week: 0.0 standard drinks of alcohol    Drug use: No   Other Topics Concern     Service No    Blood Transfusions No    Caffeine Concern No     Comment: 0-1 teas daily    Occupational Exposure No    Hobby Hazards No    Sleep Concern No    Stress Concern No    Weight Concern No    Special Diet No    Back Care No    Exercise No     Comment: gym but has been on hold with holidays    Bike Helmet No    Seat Belt Yes    Self-Exams No        REVIEW OF SYSTEMS:   GENERAL HEALTH: feels well otherwise  SKIN: denies any unusual skin lesions or rashes  RESPIRATORY: denies shortness of breath with exertion  CARDIOVASCULAR: denies chest pain on exertion  GI: denies abdominal pain and denies heartburn  NEURO: denies headaches  Musculoskeletal: No motor deficits  EXAM:   /68 (BP Location: Left arm, Patient Position: Sitting)   Pulse 84   Temp 97.2 °F (36.2 °C) (Temporal)   Resp 18   Ht 5' 6\" (1.676 m)   Wt 203 lb 6.4 oz (92.3 kg)   SpO2 94%   BMI 32.83 kg/m²   GENERAL: well developed, well nourished,in no apparent distress  SKIN: no rashes,no suspicious lesions  HEENT: TM clear bilaterally, nose no congestion, throat clear no erythema without mass.   EYES: PERRLA, EOM intact, sclera clear without injection  NECK: supple,no adenopathy, thyroid normal to palpation  LUNGS: clear to auscultation no rales, rhonchi or wheezes  CARDIO: RRR without murmur  GI: Normal bowel sounds ×4 quadrant, no hepatosplenomegaly or masses, and no tenderness   EXTREMITIES: no cyanosis, clubbing or edema  Musculoskeletal: No gross  deficit  Neurological: nerves II through XII grossly intact no sensorimotor deficit  Psychological: Mood and affect are normal.  Good communication skills.  Bilateral barefoot skin diabetic exam is abnormal with dystrophic nails and/or dry skin monofilament is normal bilaterally left  Foot flat arch great toenail fungus also third fourth and fifth toe nail fungus  Distal pulses 2+ bilateral  ASSESSMENT AND PLAN:     Encounter Diagnoses   Name Primary?    Uncontrolled type 2 diabetes mellitus with hyperglycemia (HCC) Yes    Attention deficit hyperactivity disorder (ADHD), predominantly inattentive type     Medication management        Orders Placed This Encounter   Procedures    Hemoglobin A1C       Meds & Refills for this Visit:  Requested Prescriptions     Signed Prescriptions Disp Refills    metFORMIN HCl 1000 MG Oral Tab 180 tablet 1     Sig: Take 1 tablet (1,000 mg total) by mouth 2 (two) times daily with meals.    Dulaglutide (TRULICITY) 3 MG/0.5ML Subcutaneous Solution Pen-injector 2 mL 0     Sig: Inject 3 mg into the skin once a week.    methylphenidate ER (CONCERTA) 27 MG Oral Tab CR 30 tablet 0     Sig: Take 1 tablet (27 mg total) by mouth daily.    methylphenidate ER (CONCERTA) 27 MG Oral Tab CR 30 tablet 0     Sig: Take 1 tablet (27 mg total) by mouth daily.    methylphenidate ER (CONCERTA) 27 MG Oral Tab CR 30 tablet 0     Sig: Take 1 tablet (27 mg total) by mouth daily.       Imaging & Consults:  None  1. Uncontrolled type 2 diabetes mellitus with hyperglycemia (HCC)  Continue with regular daily foot exams, eye exams yearly, healthy diet, start exercise and do weight loss  Change metformin to metformin 1000 mg twice a day  [facility]icity at [facility] to see if cost is better  Continue with atorvastatin 10 mg  No family history or personal history of medullary thyroid cancer no FH MEN,  no personal history or family history of pancreatitis.  No present GERD symptoms or bowel movement issues.    Continue with  podiatrist every 2 months for treatment    Decrease carbohydrates and start exercising at the gym again    If Trulicity is not affordable then glipizide if fasting blood sugars continue to be elevated above 130 warned of side effects of hypoglycemia    - metFORMIN HCl 1000 MG Oral Tab; Take 1 tablet (1,000 mg total) by mouth 2 (two) times daily with meals.  Dispense: 180 tablet; Refill: 1  - Hemoglobin A1C; Future  - Dulaglutide (TRULICITY) 3 MG/0.5ML Subcutaneous Solution Pen-injector; Inject 3 mg into the skin once a week.  Dispense: 2 mL; Refill: 0    2. Attention deficit hyperactivity disorder (ADHD), predominantly inattentive type  Refill on ADD medication provided patient tolerates well no side effects.  Patient is aware of risks including pulmonary hypertension.  Reviewed side effects including chest pain, shortness breath, dizziness, swelling hands and feet, tics, tremors, sleep disturbance, appetite suppression or mood changes such as anxiety or depression.     - methylphenidate ER (CONCERTA) 27 MG Oral Tab CR; Take 1 tablet (27 mg total) by mouth daily.  Dispense: 30 tablet; Refill: 0  - methylphenidate ER (CONCERTA) 27 MG Oral Tab CR; Take 1 tablet (27 mg total) by mouth daily.  Dispense: 30 tablet; Refill: 0  - methylphenidate ER (CONCERTA) 27 MG Oral Tab CR; Take 1 tablet (27 mg total) by mouth daily.  Dispense: 30 tablet; Refill: 0    3. Medication management  As above    Time spent was 40 minutes more than 50% was spent on counseling regarding medical conditions and treatment.  Rest of time was spent reviewing chart, reviewing blood work and radiology tests.     The patient indicates understanding of these issues and agrees to the plan.  The patient is asked to return in 3 months.

## 2024-03-19 NOTE — PATIENT INSTRUCTIONS
Refill policies:    Allow 2-3 business days for refills; controlled substances may take longer.  Contact your pharmacy at least 5 days prior to running out of medication and have them send an electronic request or submit request through the “request refill” option in your [employer] account.  Refills are not addressed on weekends; covering physicians do not authorize routine medications on weekends.  No narcotics or controlled substances are refilled after noon on Fridays or by on call physicians.  By law, narcotics must be electronically prescribed.  A 30 day supply with no refills is the maximum allowed.  If your prescription is due for a refill, you may be due for a follow up appointment.  To best provide you care, patients receiving routine medications need to be seen at least once a year.  Patients receiving narcotic/controlled substance medications need to be seen at least once every 3 months.  In the event that your preferred pharmacy does not have the requested medication in stock (e.g. Backordered), it is your responsibility to find another pharmacy that has the requested medication available.  We will gladly send a new prescription to that pharmacy at your request.    Scheduling Tests:    If your physician has ordered radiology tests such as MRI or CT scans, please contact Central Scheduling at [phone] right away to schedule the test.  Once scheduled, the [facility] Centralized Referral Team will work with your insurance carrier to obtain pre-certification or prior authorization.  Depending on your insurance carrier, approval may take 3-10 days.  It is highly recommended patients assure they have received an authorization before having a test performed.  If test is done without insurance authorization, patient may be responsible for the entire amount billed.      Precertification and Prior Authorizations:  If your physician has recommended that you have a procedure or additional testing performed the [facility]  Centralized Referral Team will contact your insurance carrier to obtain pre-certification or prior authorization.    You are strongly encouraged to contact your insurance carrier to verify that your procedure/test has been approved and is a COVERED benefit.  Although the [facility] Centralized Referral Team does its due diligence, the insurance carrier gives the disclaimer that \"Although the procedure is authorized, this does not guarantee payment.\"    Ultimately the patient is responsible for payment.   Thank you for your understanding in this matter.  Paperwork Completion:  If you require FMLA or disability paperwork for your recovery, please make sure to either drop it off or have it faxed to our office at [phone]. Be sure the form has your name and date of birth on it.  The form will be faxed to our Forms Department and they will complete it for you.  There is a 25$ fee for all forms that need to be filled out.  Please be aware there is a 10-14 day turnaround time.  You will need to sign a release of information (MIRNA) form if your paperwork does not come with one.  You may call the Forms Department with any questions at [phone].  Their fax number is [phone].

## 2024-03-22 NOTE — PROGRESS NOTES
HPI:     Yifan Leonardo ([id number]) is a 73 year old male with a PMH of ADD, HTN, HL, DM, hypothyroid, allergies, diverticulosis, hemorroids.   Following for:  1. 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  - Oncotype GPS 22 - c/w favorable intermediate risk disease, likelihood adverse pathology at time of RP is 33% with 4% and 1 % chance of mets and CaP death within 10 y following RP.  2. BPH/LUTS  - flomax 1/27/22  3. OAB/UI  - no meds  - Kegels reviewed  4. ED  - pt prefers observation    PCP - Tarsha Dixon - Carlos OCONNELL 10/2/2023    Presents for check-up.    He feels well today. He is taking flomax as stream weakens if he stops.    AUA SS is 3/35 with 1 n, w, f. Happy with LUTS.  Incontinence: UI/dribbles a couple times per week. No pads. Not bothersome enough to do Kegels  RADHA prior visit: ~ 40 g prostate, no nodules or tenderness    UA is negative    Has ED and prefers observation.    Prior PSAs:  - 0.18 9/29/23  - 0.26 4/3/23  - 0.41 10/3/22  - 1.21 3/30/22  - 4.23, 4K 29% 11/5/21  - 5.13 9/16/21  - 3.31 8/25/20  - 3.41 3/6/20  - 2.64 3/5/19  - 2-3 range years prior    Drinks ~ 50-60 oz water, 20-30 tea, 20 soda with medium yellow urine.    We have reviewed water/dietary irritants for OAB and he may cut back on tea/soda but urgency is not terribly bothersome at this time.     He will continue flomax for weak stream. Observation for ED and mild OAB. F/u in 6 mo with PSA prior or same day.    Prior note:    Gross hematuria: none  Tobacco hx: < 4 pack years, quit 1974  Kidney stone hx: none  Fam h/o  malignancy: none    HISTORY:  Past Medical History:   Diagnosis Date    Allergic rhinitis, cause unspecified     Chronic fatigue 8/11/2015    Controlled type 2 diabetes mellitus without complication, without long-term current use of insulin (HCC) 12/29/2016    Diabetes ([facility])      Diverticulosis 04/25/2016    Dwight Torrez M.D.    Essential hypertension     Fall 08/02/2022    Infective otitis externa, unspecified 11/04/10    Internal hemorrhoids 04/25/2016    Dwight Torrez M.D.    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    HERNIA SURGERY  01/01/08    inguinal    HERNIA SURGERY  10/10    HIP REPLACEMENT SURGERY  01/01/93    Total    KNEE ARTHROSCP HARV  11/15/10    Total; Left; Laterality    KNEE REPLACEMENT SURGERY  2011    left TKA by Dr. Barlow    LEG/ANKLE SURGERY PROC UNLISTED  01/01/60    Leg Osteotomy Tibial    OTHER SURGICAL HISTORY      left tibial osteotomy as teen    RADIATION  01/2022    TONSILLECTOMY      and adenoidectomy as child      Family History   Problem Relation Age of Onset    Colon Cancer Father     Hypertension Mother     Dementia Mother         alzheimers    Other (hypertension) Mother     Ovarian Cancer Sister     Hypertension Maternal Grandmother     Heart Disorder Maternal Grandmother         CAD    Heart Attack Maternal Grandmother     Heart Attack Maternal Grandfather     No Known Problems Son     No Known Problems Son     No Known Problems Son       Social History:   Social History     Socioeconomic History    Marital status:    Tobacco Use    Smoking status: Former     Packs/day: 1.50     Years: 4.00     Additional pack years: 0.00     Total pack years: 6.00     Types: Cigarettes     Start date: 5/19/1968     Quit date: 5/19/1973      Years since quittin.9    Smokeless tobacco: Never   Vaping Use    Vaping Use: Never used   Substance and Sexual Activity    Alcohol use: No     Alcohol/week: 0.0 standard drinks of alcohol    Drug use: No   Other Topics Concern     Service No    Blood Transfusions No    Caffeine Concern Yes     Comment: 0-1 teas daily    Occupational Exposure No    Hobby Hazards No    Sleep Concern No    Stress Concern No    Weight Concern No    Special Diet No    Back Care No    Exercise No     Comment: gym but has been on hold with holidays    Bike Helmet No    Seat Belt Yes    Self-Exams No        Medications (Active prior to today's visit):  Current Outpatient Medications   Medication Sig Dispense Refill    tamsulosin 0.4 MG Oral Cap Take 1 capsule (0.4 mg total) by mouth every evening. Take 1/2 hour following the same meal each day 90 capsule 6    metFORMIN HCl 1000 MG Oral Tab Take 1 tablet (1,000 mg total) by mouth 2 (two) times daily with meals. 180 tablet 1    Dulaglutide (TRULICITY) 3 MG/0.5ML Subcutaneous Solution Pen-injector Inject 3 mg into the skin once a week. 2 mL 0    methylphenidate ER (CONCERTA) 27 MG Oral Tab CR Take 1 tablet (27 mg total) by mouth daily. 30 tablet 0    [START ON 2024] methylphenidate ER (CONCERTA) 27 MG Oral Tab CR Take 1 tablet (27 mg total) by mouth daily. 30 tablet 0    FreeStyle Lancets Does not apply Misc TEST ONCE DAILY AS DIRECTED 100 each 3    Glucose Blood (FREESTYLE LITE TEST) In Vitro Strip TEST ONCE DAILY AS DIRECTED 100 strip 3    LEVOTHYROXINE 50 MCG Oral Tab TAKE 1 TABLET(50 MCG) BY MOUTH BEFORE BREAKFAST 90 tablet 2    LOSARTAN 100 MG Oral Tab TAKE 1 TABLET(100 MG) BY MOUTH DAILY 90 tablet 1    HYDROCHLOROTHIAZIDE 25 MG Oral Tab TAKE 1 TABLET(25 MG) BY MOUTH EVERY MORNING 90 tablet 1    SERTRALINE 50 MG Oral Tab TAKE 1 TABLET(50 MG) BY MOUTH DAILY 90 tablet 1    AMLODIPINE 5 MG Oral Tab TAKE 1 TABLET(5 MG) BY MOUTH DAILY 90 tablet 1    Meloxicam 15 MG Oral  Tab Take 1 tablet (15 mg total) by mouth daily. Take once daily for 4 weeks 30 tablet 0    atorvastatin 10 MG Oral Tab Take 1 tablet (10 mg total) by mouth daily. 90 tablet 3    lidocaine 5 % External Patch PLACE 1 PATCH ONTO THE SKIN DAILY. PUT ON FOR 12 HOURS THEN OFF FOR 12 HOURS 30 patch 5    Probiotic Product (PROBIOTIC PEARLS) Oral Cap Take 1 capsule by mouth daily.      Turmeric 500 MG Oral Cap Take 1 capsule by mouth daily.      ketoconazole (NIZORAL) 2 % External Cream APPLY A THIN FILM TO AFFECTED AREA(S) AS NEEDED EVERY DAY 60 g 1    Bioflavonoid Products (BIOFLEX) Oral Tab Take 1 tablet by mouth daily.      Cholecalciferol (VITAMIN D3) 1000 UNITS Oral Cap Take 1 tablet by mouth daily.      Cinnamon 500 MG Oral Cap Take 1 capsule by mouth 2 (two) times daily.        Omega-3 Fatty Acids (FISH OIL TRIPLE STRENGTH) 1400 MG Oral Cap Take 1 capsule by mouth daily.      Multiple Vitamins-Minerals (CENTRUM SILVER ULTRA MENS) Oral Tab Take 1 tablet by mouth daily.      ZYRTEC 10 MG OR TABS Take 1 mg by mouth daily.         Allergies:  Allergies   Allergen Reactions    Sulfa Antibiotics UNKNOWN    Zestril [Lisinopril] OTHER (SEE COMMENTS)     TABS: Cough         ROS:     A comprehensive 10 point review of systems was completed.  Pertinent positives and negatives noted in the the HPI.    PHYSICAL EXAM:     GENERAL APPEARANCE: well, developed, well nourished, in no acute distress  NEUROLOGIC: nonfocal, alert and oriented  HEAD: normocephalic, atraumatic  EYES: sclera non-icteric  EARS: hearing intact  ORAL CAVITY: mucosa moist  NECK/THYROID: no obvious goiter or masses  LUNGS: nonlabored breathing  ABDOMEN: soft, no obvious masses or tenderness  SKIN: no obvious rashes    : as noted above     ASSESSMENT/PLAN:   Diagnoses and all orders for this visit:    Prostate cancer (HCC)  -     URINALYSIS, AUTO, W/O SCOPE  -     PSA Total, Diagnostic; Future    Weak urinary stream  -     URINALYSIS, AUTO, W/O SCOPE  -      tamsulosin 0.4 MG Oral Cap; Take 1 capsule (0.4 mg total) by mouth every evening. Take 1/2 hour following the same meal each day    Erectile dysfunction, unspecified erectile dysfunction type  -     URINALYSIS, AUTO, W/O SCOPE    Urinary urgency  -     URINALYSIS, AUTO, W/O SCOPE    - as noted above    Thanks again for this consult.      Ethan Elizabeth MD, FACS  Urologist  [facility]  Office: [phone]

## 2024-04-01 ENCOUNTER — LAB ENCOUNTER (OUTPATIENT)
Dept: LAB | Age: 74
End: 2024-04-01
Attending: UROLOGY
Payer: MEDICARE

## 2024-04-01 ENCOUNTER — OFFICE VISIT (OUTPATIENT)
Dept: SURGERY | Facility: CLINIC | Age: 74
End: 2024-04-01

## 2024-04-01 DIAGNOSIS — C61 PROSTATE CANCER (HCC): Primary | ICD-10-CM

## 2024-04-01 DIAGNOSIS — C61 PROSTATE CANCER (HCC): ICD-10-CM

## 2024-04-01 DIAGNOSIS — R39.15 URINARY URGENCY: ICD-10-CM

## 2024-04-01 DIAGNOSIS — R39.12 WEAK URINARY STREAM: ICD-10-CM

## 2024-04-01 DIAGNOSIS — N52.9 ERECTILE DYSFUNCTION, UNSPECIFIED ERECTILE DYSFUNCTION TYPE: ICD-10-CM

## 2024-04-01 LAB
APPEARANCE: CLEAR
BILIRUBIN: NEGATIVE
GLUCOSE (URINE DIPSTICK): NEGATIVE MG/DL
KETONES (URINE DIPSTICK): NEGATIVE MG/DL
LEUKOCYTES: NEGATIVE
MULTISTIX LOT#: ABNORMAL NUMERIC
NITRITE, URINE: NEGATIVE
OCCULT BLOOD: NEGATIVE
PH, URINE: 6 (ref 4.5–8)
PROTEIN (URINE DIPSTICK): 30 MG/DL
PSA SERPL-MCNC: 0.22 NG/ML (ref ?–4)
SPECIFIC GRAVITY: 1.02 (ref 1–1.03)
URINE-COLOR: YELLOW
UROBILINOGEN,SEMI-QN: 0.2 MG/DL (ref 0–1.9)

## 2024-04-01 PROCEDURE — 36415 COLL VENOUS BLD VENIPUNCTURE: CPT

## 2024-04-01 PROCEDURE — 99214 OFFICE O/P EST MOD 30 MIN: CPT | Performed by: UROLOGY

## 2024-04-01 PROCEDURE — 81003 URINALYSIS AUTO W/O SCOPE: CPT | Performed by: UROLOGY

## 2024-04-01 PROCEDURE — 84153 ASSAY OF PSA TOTAL: CPT

## 2024-04-01 RX ORDER — TAMSULOSIN HYDROCHLORIDE 0.4 MG/1
0.4 CAPSULE ORAL EVERY EVENING
Qty: 90 CAPSULE | Refills: 6 | Status: SHIPPED | OUTPATIENT
Start: 2024-04-01

## 2024-04-03 ENCOUNTER — OFFICE VISIT (OUTPATIENT)
Dept: PODIATRY CLINIC | Facility: CLINIC | Age: 74
End: 2024-04-03

## 2024-04-03 DIAGNOSIS — M19.072 ARTHRITIS OF LEFT ANKLE: ICD-10-CM

## 2024-04-03 DIAGNOSIS — M20.42 HAMMER TOES OF BOTH FEET: ICD-10-CM

## 2024-04-03 DIAGNOSIS — M20.41 HAMMER TOES OF BOTH FEET: ICD-10-CM

## 2024-04-03 DIAGNOSIS — M19.072 OSTEOARTHRITIS OF LEFT MIDFOOT: ICD-10-CM

## 2024-04-03 DIAGNOSIS — B35.1 ONYCHOMYCOSIS: ICD-10-CM

## 2024-04-03 DIAGNOSIS — E11.42 DIABETIC POLYNEUROPATHY ASSOCIATED WITH TYPE 2 DIABETES MELLITUS (HCC): Primary | ICD-10-CM

## 2024-04-03 DIAGNOSIS — M79.672 LEFT FOOT PAIN: ICD-10-CM

## 2024-04-03 PROCEDURE — 99213 OFFICE O/P EST LOW 20 MIN: CPT | Performed by: STUDENT IN AN ORGANIZED HEALTH CARE EDUCATION/TRAINING PROGRAM

## 2024-04-03 NOTE — PROGRESS NOTES
[facility]iat[facility]  Progress Note    Yifan Leonardo is a 73 year old male.   Chief Complaint   Patient presents with    Toenail Care     Nail care and foot check- fbg 112- A1c 7.1 03/15/24          HPI:     Patient is a pleasant 73-year-old male who presents to clinic for diabetic foot exam.  He has elongated and thickened nails he sometimes has difficulty trimming on his own.  He does have valgus deformity with some arthritic changes to his left foot and ankle.  He uses [place] brace and diabetic shoes and states to manage this which are working well for him. His last hemoglobin A1c was 7.1% on 3/15/24.  His blood sugars were 112 mg/dL when checked this morning.  No other complaints are mentioned.  Past medical history, medications, and allergies reviewed.      Allergies: Sulfa antibiotics and Zestril [lisinopril]   Current Outpatient Medications   Medication Sig Dispense Refill    tamsulosin 0.4 MG Oral Cap Take 1 capsule (0.4 mg total) by mouth every evening. Take 1/2 hour following the same meal each day 90 capsule 6    metFORMIN HCl 1000 MG Oral Tab Take 1 tablet (1,000 mg total) by mouth 2 (two) times daily with meals. 180 tablet 1    Dulaglutide (TRULICITY) 3 MG/0.5ML Subcutaneous Solution Pen-injector Inject 3 mg into the skin once a week. 2 mL 0    methylphenidate ER (CONCERTA) 27 MG Oral Tab CR Take 1 tablet (27 mg total) by mouth daily. 30 tablet 0    [START ON 4/18/2024] methylphenidate ER (CONCERTA) 27 MG Oral Tab CR Take 1 tablet (27 mg total) by mouth daily. 30 tablet 0    FreeStyle Lancets Does not apply Misc TEST ONCE DAILY AS DIRECTED 100 each 3    Glucose Blood (FREESTYLE LITE TEST) In Vitro Strip TEST ONCE DAILY AS DIRECTED 100 strip 3    LEVOTHYROXINE 50 MCG Oral Tab TAKE 1 TABLET(50 MCG) BY MOUTH BEFORE BREAKFAST 90 tablet 2    LOSARTAN 100 MG Oral Tab TAKE 1 TABLET(100 MG) BY MOUTH DAILY 90 tablet 1    HYDROCHLOROTHIAZIDE 25 MG Oral Tab TAKE 1 TABLET(25 MG) BY MOUTH EVERY  MORNING 90 tablet 1    SERTRALINE 50 MG Oral Tab TAKE 1 TABLET(50 MG) BY MOUTH DAILY 90 tablet 1    AMLODIPINE 5 MG Oral Tab TAKE 1 TABLET(5 MG) BY MOUTH DAILY 90 tablet 1    Meloxicam 15 MG Oral Tab Take 1 tablet (15 mg total) by mouth daily. Take once daily for 4 weeks 30 tablet 0    atorvastatin 10 MG Oral Tab Take 1 tablet (10 mg total) by mouth daily. 90 tablet 3    lidocaine 5 % External Patch PLACE 1 PATCH ONTO THE SKIN DAILY. PUT ON FOR 12 HOURS THEN OFF FOR 12 HOURS 30 patch 5    Probiotic Product (PROBIOTIC PEARLS) Oral Cap Take 1 capsule by mouth daily.      Turmeric 500 MG Oral Cap Take 1 capsule by mouth daily.      ketoconazole (NIZORAL) 2 % External Cream APPLY A THIN FILM TO AFFECTED AREA(S) AS NEEDED EVERY DAY 60 g 1    Bioflavonoid Products (BIOFLEX) Oral Tab Take 1 tablet by mouth daily.      Cholecalciferol (VITAMIN D3) 1000 UNITS Oral Cap Take 1 tablet by mouth daily.      Cinnamon 500 MG Oral Cap Take 1 capsule by mouth 2 (two) times daily.        Omega-3 Fatty Acids (FISH OIL TRIPLE STRENGTH) 1400 MG Oral Cap Take 1 capsule by mouth daily.      Multiple Vitamins-Minerals (CENTRUM SILVER ULTRA MENS) Oral Tab Take 1 tablet by mouth daily.      ZYRTEC 10 MG OR TABS Take 1 mg by mouth daily.        Past Medical History:   Diagnosis Date    Allergic rhinitis, cause unspecified     Chronic fatigue 8/11/2015    Controlled type 2 diabetes mellitus without complication, without long-term current use of insulin ([facility]) 12/29/2016    Diabetes ([facility])     Diverticulosis 04/25/2016    Dwight Torrez M.D.    Essential hypertension     Fall 08/02/2022    Infective otitis externa, unspecified 11/04/10    Internal hemorrhoids 04/25/2016    Dwight Torrez M.D.    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    HERNIA SURGERY  08    inguinal    HERNIA SURGERY  10/10    HIP REPLACEMENT SURGERY  93    Total    KNEE ARTHROSCP HARV  11/15/10    Total; Left; Laterality    KNEE REPLACEMENT SURGERY      left TKA by Dr. Barlow    LEG/ANKLE SURGERY PROC UNLISTED  60    Leg Osteotomy Tibial    OTHER SURGICAL HISTORY      left tibial osteotomy as teen    RADIATION  2022    TONSILLECTOMY      and adenoidectomy as child      Family History   Problem Relation Age of Onset    Colon Cancer Father     Hypertension Mother     Dementia Mother         alzheimers    Other (hypertension) Mother     Ovarian Cancer Sister     Hypertension Maternal Grandmother     Heart Disorder Maternal Grandmother         CAD    Heart Attack Maternal Grandmother     Heart Attack Maternal Grandfather     No Known Problems Son     No Known Problems Son     No Known Problems Son       Social History     Socioeconomic History    Marital status:    Tobacco Use    Smoking status: Former     Packs/day: 1.50     Years: 4.00     Additional pack years: 0.00     Total pack years: 6.00     Types: Cigarettes     Start date: 1968     Quit date: 1973     Years since quittin.9    Smokeless tobacco: Never   Vaping Use    Vaping Use: Never used   Substance and Sexual Activity    Alcohol use: No     Alcohol/week: 0.0 standard drinks of alcohol    Drug use: No   Other Topics Concern     Service No    Blood Transfusions No    Caffeine Concern Yes     Comment: 0-1 teas daily    Occupational Exposure No    Hobby Hazards No    Sleep Concern No    Stress Concern No    Weight Concern No    Special Diet No    Back  Care No    Exercise No     Comment: gym but has been on hold with holidays    Bike Helmet No    Seat Belt Yes    Self-Exams No           REVIEW OF SYSTEMS:     Today reviewed systems as documented below  GENERAL HEALTH: feels well otherwise  SKIN: denies any unusual skin lesions or rashes  RESPIRATORY: denies shortness of breath with exertion  CARDIOVASCULAR: denies chest pain on exertion  GI: denies abdominal pain and denies heartburn  NEURO: denies headaches  MUSCULO: Acknowledges arthritis      EXAM:   There were no vitals taken for this visit.  GENERAL: well developed, well nourished, in no apparent distress  EXTREMITIES:   1. Integument: Normal skin temperature and turgor. Nails x 10 are elongated and thickened.  2. Vascular: Dorsalis pedis two out of four bilateral and posterior tibial pulses two out of   four bilateral, capillary refill normal.   3. Musculoskeletal: All muscle groups are graded 5 out of 5 in the foot and ankle.  Decreased medial arch height noted.  Mild pain on palpation noted to talonavicular joint of left foot.  Pain on palpation noted to ankle joint of the left foot.  Valgus deformity noted to left foot and ankle.  No strength deficits or pain to posterior tibial tendon or TA tendon.  Compartments are soft and compressible.   4. Neurological: Normal sharp dull sensation; reflexes normal.    Bilateral barefoot skin diabetic exam is normal, visualized feet and the appearance is normal.  Bilateral monofilament/sensation of both feet is normal.  Pulsation pedal pulse exam of both lower legs/feet is normal as well.           ASSESSMENT AND PLAN:   Diagnoses and all orders for this visit:    Diabetic polyneuropathy associated with type 2 diabetes mellitus (HCC)    Osteoarthritis of left midfoot    Arthritis of left ankle    Hammer toes of both feet    Left foot pain    Onychomycosis          Plan:     -Patient examined, chart history reviewed.  -Discussed etiology of patient's condition and  various treatment options.  -Continue ambulating with shoes/inserts/[place] brace as prescribed as well as DM shoes/inserts.  -Sharply debrided nails x10 with nail nipper without incident.  Nails smoothed with Dremel.  -Discussed importance of proper shoe gear, pedal hygiene, tight glucose control.  -Can follow-up in 2 to 3 months for routine foot care or sooner if any other concerns arise.          All questions were answered to satisfaction.    The patient indicates understanding of these issues and agrees to the plan.      Sheldon Michael DPM

## 2024-04-09 DIAGNOSIS — I10 ESSENTIAL HYPERTENSION WITH GOAL BLOOD PRESSURE LESS THAN 130/80: ICD-10-CM

## 2024-04-09 RX ORDER — AMLODIPINE BESYLATE 5 MG/1
5 TABLET ORAL DAILY
Qty: 90 TABLET | Refills: 1 | Status: SHIPPED | OUTPATIENT
Start: 2024-04-09

## 2024-04-15 ENCOUNTER — TELEPHONE (OUTPATIENT)
Dept: FAMILY MEDICINE CLINIC | Facility: CLINIC | Age: 74
End: 2024-04-15

## 2024-04-15 NOTE — TELEPHONE ENCOUNTER
Med Sent  If BP levels are ok, can hold amlodipine for the time he is taking the medication  Thanks

## 2024-04-15 NOTE — TELEPHONE ENCOUNTER
Pt notified of script and instructions for use. Will hold amlodipine. Will consult with pharmacist about holding any other meds.

## 2024-04-15 NOTE — TELEPHONE ENCOUNTER
Pt positive COVID. Symptoms began yesterday 4/14. Congestion, runny nose, sneezing. Denies SOB/LEEANN. Has used Paxlovid before and tolerated well.    Can he have script for that?  I did recommend Mucinex, fluids, cool mist humidifier. ER precautions reviewed, verbalized understanding.   [facility] pharmacy.

## 2024-04-15 NOTE — TELEPHONE ENCOUNTER
Patient calling and tested positive for covid this morning, he is asking if he can get Paxlovid.

## 2024-04-15 NOTE — TELEPHONE ENCOUNTER
Patient calling asking if rx can be resent to [facility] on file as they have not yet received it - he spoke twice to the pharmacy

## 2024-04-21 DIAGNOSIS — E11.65 UNCONTROLLED TYPE 2 DIABETES MELLITUS WITH HYPERGLYCEMIA (HCC): ICD-10-CM

## 2024-04-22 RX ORDER — DULAGLUTIDE 3 MG/.5ML
3 INJECTION, SOLUTION SUBCUTANEOUS WEEKLY
Qty: 2 ML | Refills: 0 | Status: SHIPPED | OUTPATIENT
Start: 2024-04-22

## 2024-04-22 NOTE — TELEPHONE ENCOUNTER
Requested Prescriptions     Signed Prescriptions Disp Refills    TRULICITY 3 MG/0.5ML Subcutaneous Solution Pen-injector 2 mL 0     Sig: inject 3mg into the skin once a week     Authorizing Provider: STACY RANDHAWA     Ordering User: HYUN THOMAS     Refilled per protocol/OV notes

## 2024-04-29 ENCOUNTER — OFFICE VISIT (OUTPATIENT)
Facility: LOCATION | Age: 74
End: 2024-04-29
Payer: MEDICARE

## 2024-04-29 DIAGNOSIS — H61.23 BILATERAL IMPACTED CERUMEN: Primary | ICD-10-CM

## 2024-04-29 PROCEDURE — 99214 OFFICE O/P EST MOD 30 MIN: CPT | Performed by: OTOLARYNGOLOGY

## 2024-04-29 PROCEDURE — 92504 EAR MICROSCOPY EXAMINATION: CPT | Performed by: OTOLARYNGOLOGY

## 2024-04-29 NOTE — PROGRESS NOTES
Yifan Leonardo is a 73 year old male.   Chief Complaint   Patient presents with    Cerumen Impaction     HPI:   73-year-old white male he wears hearing aids gets frequent wax impaction which impair the function of the hearing aids.  Denies otorrhea otalgia vertigo or tinnitus.  Current Outpatient Medications   Medication Sig Dispense Refill    TRULICITY 3 MG/0.5ML Subcutaneous Solution Pen-injector inject 3mg into the skin once a week 2 mL 0    amLODIPine 5 MG Oral Tab Take 1 tablet (5 mg total) by mouth daily. 90 tablet 1    tamsulosin 0.4 MG Oral Cap Take 1 capsule (0.4 mg total) by mouth every evening. Take 1/2 hour following the same meal each day 90 capsule 6    metFORMIN HCl 1000 MG Oral Tab Take 1 tablet (1,000 mg total) by mouth 2 (two) times daily with meals. 180 tablet 1    methylphenidate ER (CONCERTA) 27 MG Oral Tab CR Take 1 tablet (27 mg total) by mouth daily. 30 tablet 0    FreeStyle Lancets Does not apply Misc TEST ONCE DAILY AS DIRECTED 100 each 3    Glucose Blood (FREESTYLE LITE TEST) In Vitro Strip TEST ONCE DAILY AS DIRECTED 100 strip 3    LEVOTHYROXINE 50 MCG Oral Tab TAKE 1 TABLET(50 MCG) BY MOUTH BEFORE BREAKFAST 90 tablet 2    LOSARTAN 100 MG Oral Tab TAKE 1 TABLET(100 MG) BY MOUTH DAILY 90 tablet 1    HYDROCHLOROTHIAZIDE 25 MG Oral Tab TAKE 1 TABLET(25 MG) BY MOUTH EVERY MORNING 90 tablet 1    SERTRALINE 50 MG Oral Tab TAKE 1 TABLET(50 MG) BY MOUTH DAILY 90 tablet 1    Meloxicam 15 MG Oral Tab Take 1 tablet (15 mg total) by mouth daily. Take once daily for 4 weeks 30 tablet 0    atorvastatin 10 MG Oral Tab Take 1 tablet (10 mg total) by mouth daily. 90 tablet 3    lidocaine 5 % External Patch PLACE 1 PATCH ONTO THE SKIN DAILY. PUT ON FOR 12 HOURS THEN OFF FOR 12 HOURS 30 patch 5    Probiotic Product (PROBIOTIC PEARLS) Oral Cap Take 1 capsule by mouth daily.      Turmeric 500 MG Oral Cap Take 1 capsule by mouth daily.      ketoconazole (NIZORAL) 2 % External Cream APPLY A THIN FILM  TO AFFECTED AREA(S) AS NEEDED EVERY DAY 60 g 1    Bioflavonoid Products (BIOFLEX) Oral Tab Take 1 tablet by mouth daily.      Cholecalciferol (VITAMIN D3) 1000 UNITS Oral Cap Take 1 tablet by mouth daily.      Cinnamon 500 MG Oral Cap Take 1 capsule by mouth 2 (two) times daily.        Omega-3 Fatty Acids (FISH OIL TRIPLE STRENGTH) 1400 MG Oral Cap Take 1 capsule by mouth daily.      Multiple Vitamins-Minerals (CENTRUM SILVER ULTRA MENS) Oral Tab Take 1 tablet by mouth daily.      ZYRTEC 10 MG OR TABS Take 1 mg by mouth daily.        Past Medical History:    Allergic rhinitis, cause unspecified    Chronic fatigue    Controlled type 2 diabetes mellitus without complication, without long-term current use of insulin (HCC)    Diabetes (HCC)    Diverticulosis    Dwight Torrez M.D.    Essential hypertension    Fall    Infective otitis externa, unspecified    Internal hemorrhoids    Dwight Torrez M.D.    Intestinal disaccharidase deficiencies and disaccharide malabsorption    Obesity    Overweight(278.02)    Pre-ulcerative corn or callous    Preoperative examination, unspecified    Prostate cancer (HCC)    Psychosexual dysfunction with inhibited sexual excitement    Sleep apnea    Uncontrolled type 2 diabetes mellitus with hyperglycemia (HCC)      Social History:  Social History     Socioeconomic History    Marital status:    Tobacco Use    Smoking status: Former     Current packs/day: 0.00     Average packs/day: 1.5 packs/day for 5.0 years (7.5 ttl pk-yrs)     Types: Cigarettes     Start date: 1968     Quit date: 1973     Years since quittin.9    Smokeless tobacco: Never   Vaping Use    Vaping status: Never Used   Substance and Sexual Activity    Alcohol use: No     Alcohol/week: 0.0 standard drinks of alcohol    Drug use: No   Other Topics Concern     Service No    Blood Transfusions No    Caffeine Concern Yes     Comment: 0-1 teas daily    Occupational Exposure No    Hobby Hazards No    Sleep  Concern No    Stress Concern No    Weight Concern No    Special Diet No    Back Care No    Exercise No     Comment: gym but has been on hold with holidays    Bike Helmet No    Seat Belt Yes    Self-Exams No      Past Surgical History:   Procedure Laterality Date    Colonoscopy  01/01/05    complete    Colonoscopy      Colonoscopy,diagnostic  04/25/2016    Due 04/25/2026; Dwight Torrez M.D.    Hernia surgery  01/01/08    inguinal    Hernia surgery  10/10    Hip replacement surgery  01/01/93    Total    Knee arthroscp [facility]  11/15/10    Total; Left; Laterality    Knee replacement surgery  2011    left TKA by Dr. Barlow    Leg/ankle surgery proc unlisted  01/01/60    Leg Osteotomy Tibial    Other surgical history      left tibial osteotomy as teen    Radiation  01/2022    Tonsillectomy      and adenoidectomy as child         REVIEW OF SYSTEMS:   GENERAL HEALTH: feels well otherwise  GENERAL : denies fever, chills, sweats, weight loss, weight gain  SKIN: denies any unusual skin lesions or rashes  RESPIRATORY: denies shortness of breath with exertion  NEURO: denies headaches    EXAM:   There were no vitals taken for this visit.    System Pertinent findings Details   Constitutional  Overall appearance - Normal.   Head/Face  Facial features -- Normal. Skull - Normal.   Eyes  Pupils equal ,round ,react to light and accomidate   Ears  External Ear Right: Normal, Left: Normal. Canal - Right: Normal, Left: Normal. TM - Right: Wax removed TM normal left: Wax removed TM normal   Nose  External Nose, Normal, Septum -midline,Nasal Vault, clear. Turbinates - Right: Normal left: Normal   Mouth/Throat  Lips/teeth/gums - Normal. Tonsils -0+ oropharynx - Normal.   Neck Exam  Inspection - Normal. Palpation - Normal. Parotid gland - Normal. Thyroid gland -normal   Lymph Detail  Submental. Submandibular. Anterior cervical. Posterior cervical. Supraclavicular.     Patients exam showed wax impaction right ear, wax impaction left ear. Ear wax  was removed under the operative microscope. No complications. Patient tolerated the procedure well. Ear exam findings listed in note after removal.  ASSESSMENT AND PLAN:   1. Bilateral impacted cerumen  Follow-up 3 months      The patient indicates understanding of these issues and agrees to the plan.      Arsen Faustin MD  4/29/2024  2:18 PM

## 2024-05-16 DIAGNOSIS — E11.65 UNCONTROLLED TYPE 2 DIABETES MELLITUS WITH HYPERGLYCEMIA (HCC): ICD-10-CM

## 2024-05-16 RX ORDER — DULAGLUTIDE 3 MG/.5ML
INJECTION, SOLUTION SUBCUTANEOUS
Qty: 2 ML | Refills: 0 | Status: SHIPPED | OUTPATIENT
Start: 2024-05-16

## 2024-05-16 NOTE — TELEPHONE ENCOUNTER
Requested Prescriptions     Signed Prescriptions Disp Refills    TRULICITY 3 MG/0.5ML Subcutaneous Solution Pen-injector 2 mL 0     Sig: inject 3mg (0.5ml) into the skin once a week     Authorizing Provider: STACY RANDHAWA     Ordering User: HYUN THOMAS     Refilled per protocol/OV notes

## 2024-06-05 ENCOUNTER — OFFICE VISIT (OUTPATIENT)
Dept: PODIATRY CLINIC | Facility: CLINIC | Age: 74
End: 2024-06-05

## 2024-06-05 DIAGNOSIS — M20.41 HAMMER TOES OF BOTH FEET: ICD-10-CM

## 2024-06-05 DIAGNOSIS — B35.1 ONYCHOMYCOSIS: Primary | ICD-10-CM

## 2024-06-05 DIAGNOSIS — E11.42 DIABETIC POLYNEUROPATHY ASSOCIATED WITH TYPE 2 DIABETES MELLITUS (HCC): ICD-10-CM

## 2024-06-05 DIAGNOSIS — M20.42 HAMMER TOES OF BOTH FEET: ICD-10-CM

## 2024-06-05 DIAGNOSIS — M19.072 OSTEOARTHRITIS OF LEFT MIDFOOT: ICD-10-CM

## 2024-06-05 DIAGNOSIS — M19.072 ARTHRITIS OF LEFT ANKLE: ICD-10-CM

## 2024-06-05 PROCEDURE — 99213 OFFICE O/P EST LOW 20 MIN: CPT | Performed by: STUDENT IN AN ORGANIZED HEALTH CARE EDUCATION/TRAINING PROGRAM

## 2024-06-05 NOTE — PROGRESS NOTES
[facility]  Progress Note    Yifan Leonardo is a 74 year old male.   Chief Complaint   Patient presents with    Diabetic Foot Care     Nail care and foot check- Fbs 108- A1C 7.1 on 3/15         HPI:     Patient is a pleasant 74-year-old male who presents to clinic for diabetic foot exam.  He has elongated and thickened nails he sometimes has difficulty trimming on his own.  He does have valgus deformity with some arthritic changes to his left foot and ankle.  He uses [place] brace and diabetic shoes and states to manage this which are working well for him. His last hemoglobin A1c was 7.1% on 3/15/24.  His blood sugars were 108 mg/dL when checked this morning.  No other complaints are mentioned.  Past medical history, medications, and allergies reviewed.      Allergies: Sulfa antibiotics and Zestril [lisinopril]   Current Outpatient Medications   Medication Sig Dispense Refill    TRULICITY 3 MG/0.5ML Subcutaneous Solution Pen-injector inject 3mg (0.5ml) into the skin once a week 2 mL 0    amLODIPine 5 MG Oral Tab Take 1 tablet (5 mg total) by mouth daily. 90 tablet 1    tamsulosin 0.4 MG Oral Cap Take 1 capsule (0.4 mg total) by mouth every evening. Take 1/2 hour following the same meal each day 90 capsule 6    metFORMIN HCl 1000 MG Oral Tab Take 1 tablet (1,000 mg total) by mouth 2 (two) times daily with meals. 180 tablet 1    FreeStyle Lancets Does not apply Misc TEST ONCE DAILY AS DIRECTED 100 each 3    Glucose Blood (FREESTYLE LITE TEST) In Vitro Strip TEST ONCE DAILY AS DIRECTED 100 strip 3    LEVOTHYROXINE 50 MCG Oral Tab TAKE 1 TABLET(50 MCG) BY MOUTH BEFORE BREAKFAST 90 tablet 2    LOSARTAN 100 MG Oral Tab TAKE 1 TABLET(100 MG) BY MOUTH DAILY 90 tablet 1    HYDROCHLOROTHIAZIDE 25 MG Oral Tab TAKE 1 TABLET(25 MG) BY MOUTH EVERY MORNING 90 tablet 1    SERTRALINE 50 MG Oral Tab TAKE 1 TABLET(50 MG) BY MOUTH DAILY 90 tablet 1    Meloxicam 15 MG Oral Tab Take 1 tablet (15 mg total) by  mouth daily. Take once daily for 4 weeks 30 tablet 0    atorvastatin 10 MG Oral Tab Take 1 tablet (10 mg total) by mouth daily. 90 tablet 3    lidocaine 5 % External Patch PLACE 1 PATCH ONTO THE SKIN DAILY. PUT ON FOR 12 HOURS THEN OFF FOR 12 HOURS 30 patch 5    Probiotic Product (PROBIOTIC PEARLS) Oral Cap Take 1 capsule by mouth daily.      Turmeric 500 MG Oral Cap Take 1 capsule by mouth daily.      ketoconazole (NIZORAL) 2 % External Cream APPLY A THIN FILM TO AFFECTED AREA(S) AS NEEDED EVERY DAY 60 g 1    Bioflavonoid Products (BIOFLEX) Oral Tab Take 1 tablet by mouth daily.      Cholecalciferol (VITAMIN D3) 1000 UNITS Oral Cap Take 1 tablet by mouth daily.      Cinnamon 500 MG Oral Cap Take 1 capsule by mouth 2 (two) times daily.        Omega-3 Fatty Acids (FISH OIL TRIPLE STRENGTH) 1400 MG Oral Cap Take 1 capsule by mouth daily.      Multiple Vitamins-Minerals (CENTRUM SILVER ULTRA MENS) Oral Tab Take 1 tablet by mouth daily.      ZYRTEC 10 MG OR TABS Take 1 mg by mouth daily.        Past Medical History:    Allergic rhinitis, cause unspecified    Chronic fatigue    Controlled type 2 diabetes mellitus without complication, without long-term current use of insulin (HCC)    Diabetes (HCC)    Diverticulosis    Dwight Torrez M.D.    Essential hypertension    Fall    Infective otitis externa, unspecified    Internal hemorrhoids    Dwight Torrez M.D.    Intestinal disaccharidase deficiencies and disaccharide malabsorption    Obesity    Overweight(278.02)    Pre-ulcerative corn or callous    Preoperative examination, unspecified    Prostate cancer (HCC)    Psychosexual dysfunction with inhibited sexual excitement    Sleep apnea    Uncontrolled type 2 diabetes mellitus with hyperglycemia (HCC)      Past Surgical History:   Procedure Laterality Date    Colonoscopy  01/01/05    complete    Colonoscopy      Colonoscopy,diagnostic  04/25/2016    Due 04/25/2026; Dwight Torrez M.D.    Hernia surgery  01/01/08    inguinal     Hernia surgery  10/10    Hip replacement surgery  89    Total    Knee arthroscp [facility]  11/15/10    Total; Left; Laterality    Knee replacement surgery      left TKA by Dr. Barlow    Leg/ankle surgery proc unlisted  61    Leg Osteotomy Tibial    Other surgical history      left tibial osteotomy as teen    Radiation  2022    Tonsillectomy      and adenoidectomy as child      Family History   Problem Relation Age of Onset    Colon Cancer Father     Hypertension Mother     Dementia Mother         alzheimers    Other (hypertension) Mother     Ovarian Cancer Sister     Hypertension Maternal Grandmother     Heart Disorder Maternal Grandmother         CAD    Heart Attack Maternal Grandmother     Heart Attack Maternal Grandfather     No Known Problems Son     No Known Problems Son     No Known Problems Son       Social History     Socioeconomic History    Marital status:    Tobacco Use    Smoking status: Former     Current packs/day: 0.00     Average packs/day: 1.5 packs/day for 5.0 years (7.5 ttl pk-yrs)     Types: Cigarettes     Start date: 1968     Quit date: 1973     Years since quittin.0    Smokeless tobacco: Never   Vaping Use    Vaping status: Never Used   Substance and Sexual Activity    Alcohol use: No     Alcohol/week: 0.0 standard drinks of alcohol    Drug use: No   Other Topics Concern     Service No    Blood Transfusions No    Caffeine Concern Yes     Comment: 0-1 teas daily    Occupational Exposure No    Hobby Hazards No    Sleep Concern No    Stress Concern No    Weight Concern No    Special Diet No    Back Care No    Exercise No     Comment: gym but has been on hold with holidays    Bike Helmet No    Seat Belt Yes    Self-Exams No           REVIEW OF SYSTEMS:     Today reviewed systems as documented below  GENERAL HEALTH: feels well otherwise  SKIN: denies any unusual skin lesions or rashes  RESPIRATORY: denies shortness of breath with exertion  CARDIOVASCULAR:  denies chest pain on exertion  GI: denies abdominal pain and denies heartburn  NEURO: denies headaches  MUSCULO: Acknowledges arthritis      EXAM:   There were no vitals taken for this visit.  GENERAL: well developed, well nourished, in no apparent distress  EXTREMITIES:   1. Integument: Normal skin temperature and turgor. Nails x 10 are elongated and thickened.  2. Vascular: Dorsalis pedis two out of four bilateral and posterior tibial pulses two out of   four bilateral, capillary refill normal.   3. Musculoskeletal: All muscle groups are graded 5 out of 5 in the foot and ankle.  Decreased medial arch height noted.  Mild pain on palpation noted to talonavicular joint of left foot.  Pain on palpation noted to ankle joint of the left foot.  Valgus deformity noted to left foot and ankle.  No strength deficits or pain to posterior tibial tendon or TA tendon.  Compartments are soft and compressible.   4. Neurological: Normal sharp dull sensation; reflexes normal.    Bilateral barefoot skin diabetic exam is normal, visualized feet and the appearance is normal.  Bilateral monofilament/sensation of both feet is normal.  Pulsation pedal pulse exam of both lower legs/feet is normal as well.           ASSESSMENT AND PLAN:   Diagnoses and all orders for this visit:    Onychomycosis    Diabetic polyneuropathy associated with type 2 diabetes mellitus (HCC)    Osteoarthritis of left midfoot    Arthritis of left ankle    Hammer toes of both feet            Plan:     -Patient examined, chart history reviewed.  -Discussed etiology of patient's condition and various treatment options.  -Continue ambulating with shoes/inserts/[place] brace as prescribed as well as DM shoes/inserts.  -Sharply debrided nails x10 with nail nipper without incident.  Nails smoothed with Dremel.  -Discussed importance of proper shoe gear, pedal hygiene, tight glucose control.  -Can follow-up in 2 to 3 months for routine foot care or sooner if any other  concerns arise.    All questions were answered to satisfaction.    The patient indicates understanding of these issues and agrees to the plan.      Sheldon Michael DPM

## 2024-06-17 ENCOUNTER — LAB ENCOUNTER (OUTPATIENT)
Dept: LAB | Age: 74
End: 2024-06-17
Attending: FAMILY MEDICINE

## 2024-06-17 DIAGNOSIS — E11.65 UNCONTROLLED TYPE 2 DIABETES MELLITUS WITH HYPERGLYCEMIA (HCC): ICD-10-CM

## 2024-06-17 LAB
EST. AVERAGE GLUCOSE BLD GHB EST-MCNC: 131 MG/DL (ref 68–126)
HBA1C MFR BLD: 6.2 % (ref ?–5.7)

## 2024-06-17 PROCEDURE — 36415 COLL VENOUS BLD VENIPUNCTURE: CPT

## 2024-06-17 PROCEDURE — 83036 HEMOGLOBIN GLYCOSYLATED A1C: CPT

## 2024-06-18 ENCOUNTER — OFFICE VISIT (OUTPATIENT)
Dept: FAMILY MEDICINE CLINIC | Facility: CLINIC | Age: 74
End: 2024-06-18

## 2024-06-18 VITALS
WEIGHT: 200 LBS | HEART RATE: 62 BPM | DIASTOLIC BLOOD PRESSURE: 72 MMHG | BODY MASS INDEX: 32.14 KG/M2 | TEMPERATURE: 97 F | HEIGHT: 66 IN | OXYGEN SATURATION: 96 % | RESPIRATION RATE: 18 BRPM | SYSTOLIC BLOOD PRESSURE: 128 MMHG

## 2024-06-18 DIAGNOSIS — E78.2 MIXED HYPERLIPIDEMIA: ICD-10-CM

## 2024-06-18 DIAGNOSIS — F90.0 ATTENTION DEFICIT HYPERACTIVITY DISORDER (ADHD), PREDOMINANTLY INATTENTIVE TYPE: ICD-10-CM

## 2024-06-18 DIAGNOSIS — L21.9 SEBORRHEIC DERMATITIS: ICD-10-CM

## 2024-06-18 DIAGNOSIS — I25.83 CORONARY ARTERY DISEASE DUE TO LIPID RICH PLAQUE: ICD-10-CM

## 2024-06-18 DIAGNOSIS — E11.9 CONTROLLED TYPE 2 DIABETES MELLITUS WITHOUT COMPLICATION, WITHOUT LONG-TERM CURRENT USE OF INSULIN (HCC): Primary | ICD-10-CM

## 2024-06-18 DIAGNOSIS — I10 ESSENTIAL HYPERTENSION WITH GOAL BLOOD PRESSURE LESS THAN 130/80: ICD-10-CM

## 2024-06-18 DIAGNOSIS — F41.1 GAD (GENERALIZED ANXIETY DISORDER): ICD-10-CM

## 2024-06-18 DIAGNOSIS — F32.5 DEPRESSION, MAJOR, IN REMISSION (HCC): ICD-10-CM

## 2024-06-18 DIAGNOSIS — Z79.899 MEDICATION MANAGEMENT: ICD-10-CM

## 2024-06-18 DIAGNOSIS — I25.10 CORONARY ARTERY DISEASE DUE TO LIPID RICH PLAQUE: ICD-10-CM

## 2024-06-18 PROCEDURE — 99215 OFFICE O/P EST HI 40 MIN: CPT | Performed by: FAMILY MEDICINE

## 2024-06-18 RX ORDER — METHYLPHENIDATE HYDROCHLORIDE 27 MG/1
27 TABLET ORAL DAILY
Qty: 30 TABLET | Refills: 0 | Status: SHIPPED | OUTPATIENT
Start: 2024-06-21 | End: 2024-07-21

## 2024-06-18 RX ORDER — METHYLPHENIDATE HYDROCHLORIDE 27 MG/1
27 TABLET ORAL DAILY
Qty: 30 TABLET | Refills: 0 | Status: SHIPPED | OUTPATIENT
Start: 2024-07-21 | End: 2024-08-20

## 2024-06-18 RX ORDER — DULAGLUTIDE 3 MG/.5ML
3 INJECTION, SOLUTION SUBCUTANEOUS
Qty: 2 ML | Refills: 5 | Status: SHIPPED | OUTPATIENT
Start: 2024-06-18

## 2024-06-18 RX ORDER — METHYLPHENIDATE HYDROCHLORIDE 27 MG/1
27 TABLET ORAL DAILY
Qty: 30 TABLET | Refills: 0 | Status: SHIPPED | OUTPATIENT
Start: 2024-08-20 | End: 2024-09-19

## 2024-06-18 RX ORDER — ATORVASTATIN CALCIUM 10 MG/1
10 TABLET, FILM COATED ORAL DAILY
Qty: 90 TABLET | Refills: 3 | Status: SHIPPED | OUTPATIENT
Start: 2024-06-18

## 2024-06-18 RX ORDER — LOSARTAN POTASSIUM 100 MG/1
100 TABLET ORAL DAILY
Qty: 90 TABLET | Refills: 1 | Status: SHIPPED | OUTPATIENT
Start: 2024-06-18

## 2024-06-18 RX ORDER — KETOCONAZOLE 20 MG/G
1 CREAM TOPICAL
Qty: 60 G | Refills: 1 | Status: SHIPPED | OUTPATIENT
Start: 2024-06-18

## 2024-06-18 RX ORDER — HYDROCHLOROTHIAZIDE 25 MG/1
25 TABLET ORAL EVERY MORNING
Qty: 90 TABLET | Refills: 1 | Status: SHIPPED | OUTPATIENT
Start: 2024-06-18

## 2024-06-18 NOTE — PROGRESS NOTES
Yifan Leonardo is a 74 year old male.  HPI:   Patient is in the office today to follow-up on diabetes, ADD, hypertension, hyperlipidemia, generalized anxiety disorder and seborrheic dermatitis    Controlled type 2 diabetes mellitus with hyperglycemia (HCC)  6/17/2024 hemoglobin A1c 6.2 improved from 3/15/2024 hemoglobin A1c 7.1  3/15/2024 urine microalbumin creatinine ratio normal, LDL 52 and GFR 87  Diabetic medications atorvastatin 10 mg no muscle aches, Trulicity 3 mg weekly, losartan 100 mg daily, metformin 1000 mg twice a day no diarrhea  Restarted Trulicity past 3 months prior was off of it due to cost price was more the first month then went down the second and third month.  Notices a great improvement with his appetite.  No GERDor constipation.  Fasting blood sugars between    Stops eating past 6 PM  CPAP using it once weekly   Dilated eye exam completed 5/28/2024 normal   Podiatrist UTD every 51 days trimming and feet are good Dr Block     Exercise reclined elliptical for 1 hour 1-2 times a week; will be going to the gym starting in July 3 times a week  Balance is better no falls  besides tripped over something 2 weeks ago left side contusion no breathing issues or pain with movement able to exercise with no difficulty      No family history or personal history of medullary thyroid cancer no FH MEN,  no personal history or family history of pancreatitis.    No present GERD symptoms or bowel movement issues.     Feet no numbness no tingling sees podiatrist every 2 months      Diet mostly home-cooked food limiting carbohydrates mostly fruit does not like vegetables but overall is eating healthy occasionally eats out     Attention deficit hyperactivity disorder (ADHD), predominantly inattentive type/NE/depression in remission  He denies any side effects including no chest pain, shortness breath, dizziness, swelling hands and feet, tics,  sleep disturbance or appetite suppression. Mood has  been good with no signs of anxiety or depression.  Concerta 27 mg works well for tasks starting, staying and finishing his attention span is better with Concerta versus Focalin and energy improved.  Mood is doing good with no anxiety or depression does take sertraline 50 mg daily well-tolerated no side effects request refill on sertraline.  Wants to stay on the sertraline to help with his mood stability especially the anxiety which is much better  No depression symptoms states that he has good interest and pleasure in doing things  No counseling for 2 years     Does notice a very slight tremor in left second finger tremor has not changed discussed with neurologist no concerns has not noticed it much recently just very mild .    Seborrheic dermatitis  Needs refill on topical ketoconazole cream uses 1-2 times a day for upper forehead has worked great with no side effects    ---Hyperlipidemia/hypertension/CAD  Amlodipine 5 mg, atorvastatin 10 mg, hydrochlorothiazide 25 mg, losartan 100 mg patient tolerates medications well no side effects.  3/15/2024 LDL 52 and GFR 87 with normal electrolytes  9/20/2023 no significant stenosis bilateral  9/19/2023  total score 138 mild CAD  9/30/2020 normal exercise stress test  Denies any chest pain, SOB, dizziness or fainting.  No swelling in the hands or feet.  No symptoms of PAD.  Patient has been exercising regularly and not having any difficulties with exercise tolerance up to an hour on the elliptical 2 times a week  Was a 5 pack/year smoker quit 1973  Current Outpatient Medications   Medication Sig Dispense Refill    Dulaglutide (TRULICITY) 3 MG/0.5ML Subcutaneous Solution Pen-injector Inject 3 mg as directed every 7 days. 2 mL 5    atorvastatin 10 MG Oral Tab Take 1 tablet (10 mg total) by mouth daily. 90 tablet 3    losartan 100 MG Oral Tab Take 1 tablet (100 mg total) by mouth daily. 90 tablet 1    hydroCHLOROthiazide 25 MG Oral Tab Take 1 tablet (25 mg total) by  mouth every morning. 90 tablet 1    sertraline 50 MG Oral Tab Take 1 tablet (50 mg total) by mouth daily. 90 tablet 1    [START ON 6/21/2024] methylphenidate ER (CONCERTA) 27 MG Oral Tab CR Take 1 tablet (27 mg total) by mouth daily. 30 tablet 0    [START ON 7/21/2024] methylphenidate ER (CONCERTA) 27 MG Oral Tab CR Take 1 tablet (27 mg total) by mouth daily. 30 tablet 0    [START ON 8/20/2024] methylphenidate ER (CONCERTA) 27 MG Oral Tab CR Take 1 tablet (27 mg total) by mouth daily. 30 tablet 0    ketoconazole 2 % External Cream Apply 1 Application topically daily as needed. 60 g 1    amLODIPine 5 MG Oral Tab Take 1 tablet (5 mg total) by mouth daily. 90 tablet 1    tamsulosin 0.4 MG Oral Cap Take 1 capsule (0.4 mg total) by mouth every evening. Take 1/2 hour following the same meal each day 90 capsule 6    metFORMIN HCl 1000 MG Oral Tab Take 1 tablet (1,000 mg total) by mouth 2 (two) times daily with meals. 180 tablet 1    FreeStyle Lancets Does not apply Misc TEST ONCE DAILY AS DIRECTED 100 each 3    Glucose Blood (FREESTYLE LITE TEST) In Vitro Strip TEST ONCE DAILY AS DIRECTED 100 strip 3    LEVOTHYROXINE 50 MCG Oral Tab TAKE 1 TABLET(50 MCG) BY MOUTH BEFORE BREAKFAST 90 tablet 2    Meloxicam 15 MG Oral Tab Take 1 tablet (15 mg total) by mouth daily. Take once daily for 4 weeks 30 tablet 0    lidocaine 5 % External Patch PLACE 1 PATCH ONTO THE SKIN DAILY. PUT ON FOR 12 HOURS THEN OFF FOR 12 HOURS (Patient taking differently: PLACE 1 PATCH ONTO THE SKIN DAILY. PUT ON FOR 12 HOURS THEN OFF FOR 12 HOURS as needed) 30 patch 5    Probiotic Product (PROBIOTIC PEARLS) Oral Cap Take 1 capsule by mouth daily.      Turmeric 500 MG Oral Cap Take 1 capsule by mouth daily.      Bioflavonoid Products (BIOFLEX) Oral Tab Take 1 tablet by mouth daily.      Cholecalciferol (VITAMIN D3) 1000 UNITS Oral Cap Take 1 tablet by mouth daily.      Cinnamon 500 MG Oral Cap Take 1 capsule by mouth 2 (two) times daily.        Omega-3 Fatty  Acids (FISH OIL TRIPLE STRENGTH) 1400 MG Oral Cap Take 1 capsule by mouth daily.      Multiple Vitamins-Minerals (CENTRUM SILVER ULTRA MENS) Oral Tab Take 1 tablet by mouth daily.      ZYRTEC 10 MG OR TABS Take 1 mg by mouth daily.        Past Medical History:    Allergic rhinitis, cause unspecified    Chronic fatigue    Controlled type 2 diabetes mellitus without complication, without long-term current use of insulin (HCC)    Diabetes (HCC)    Diverticulosis    Dwight Torrez M.D.    Essential hypertension    Fall    Infective otitis externa, unspecified    Internal hemorrhoids    Dwight Torrez M.D.    Intestinal disaccharidase deficiencies and disaccharide malabsorption    Obesity    Overweight(278.02)    Pre-ulcerative corn or callous    Preoperative examination, unspecified    Prostate cancer (HCC)    Psychosexual dysfunction with inhibited sexual excitement    Sleep apnea    Uncontrolled type 2 diabetes mellitus with hyperglycemia (HCC)      Social History:  Social History     Socioeconomic History    Marital status:    Tobacco Use    Smoking status: Former     Current packs/day: 0.00     Average packs/day: 1.5 packs/day for 5.0 years (7.5 ttl pk-yrs)     Types: Cigarettes     Start date: 1968     Quit date: 1973     Years since quittin.1    Smokeless tobacco: Never   Vaping Use    Vaping status: Never Used   Substance and Sexual Activity    Alcohol use: No     Alcohol/week: 0.0 standard drinks of alcohol    Drug use: No   Other Topics Concern     Service No    Blood Transfusions No    Caffeine Concern Yes     Comment: 0-1 teas daily    Occupational Exposure No    Hobby Hazards No    Sleep Concern No    Stress Concern No    Weight Concern No    Special Diet No    Back Care No    Exercise No     Comment: gym but has been on hold with holidays    Bike Helmet No    Seat Belt Yes    Self-Exams No        REVIEW OF SYSTEMS:   GENERAL HEALTH: feels well otherwise  SKIN: denies any unusual  skin lesions or rashes  RESPIRATORY: denies shortness of breath with exertion  CARDIOVASCULAR: denies chest pain on exertion  GI: denies abdominal pain and denies heartburn  NEURO: denies headaches  Musculoskeletal: No motor deficits  Psych see above  EXAM:   /72 (BP Location: Left arm, Patient Position: Sitting, Cuff Size: adult)   Pulse 62   Temp 97.3 °F (36.3 °C) (Temporal)   Resp 18   Ht 5' 6\" (1.676 m)   Wt 200 lb (90.7 kg)   SpO2 96%   BMI 32.28 kg/m²   GENERAL: well developed, well nourished,in no apparent distress  SKIN: no rashes,no suspicious lesions no evidence of significant seborrheic dermatitis on forehead today he is using ketoconazole cream as needed needs refill  .   EYES: PERRLA, EOM intact, sclera clear without injection  NECK: supple,no adenopathy, thyroid normal to palpation  LUNGS: clear to auscultation no rales, rhonchi or wheezes  CARDIO: RRR without murmur  GI: Normal bowel sounds ×4 quadrant, no hepatosplenomegaly or masses, and no tenderness   EXTREMITIES: no cyanosis, clubbing mild 1+ pitting edema to the left leg which is chronic after having total knee arthroplasty   Musculoskeletal: No gross deficit  Neurological: nerves II through XII grossly intact no sensorimotor deficit no tics or tremors present today  Psychological: Mood and affect are normal.  Good communication skills.  ASSESSMENT AND PLAN:     Encounter Diagnoses   Name Primary?    Controlled type 2 diabetes mellitus without complication, without long-term current use of insulin (HCC) Yes    Attention deficit hyperactivity disorder (ADHD), predominantly inattentive type     Mixed hyperlipidemia     Coronary artery disease due to lipid rich plaque     Essential hypertension with goal blood pressure less than 130/80     NE (generalized anxiety disorder)     Medication management     Seborrheic dermatitis        No orders of the defined types were placed in this encounter.      Meds & Refills for this  Visit:  Requested Prescriptions     Signed Prescriptions Disp Refills    Dulaglutide (TRULICITY) 3 MG/0.5ML Subcutaneous Solution Pen-injector 2 mL 5     Sig: Inject 3 mg as directed every 7 days.    atorvastatin 10 MG Oral Tab 90 tablet 3     Sig: Take 1 tablet (10 mg total) by mouth daily.    losartan 100 MG Oral Tab 90 tablet 1     Sig: Take 1 tablet (100 mg total) by mouth daily.    hydroCHLOROthiazide 25 MG Oral Tab 90 tablet 1     Sig: Take 1 tablet (25 mg total) by mouth every morning.    sertraline 50 MG Oral Tab 90 tablet 1     Sig: Take 1 tablet (50 mg total) by mouth daily.    methylphenidate ER (CONCERTA) 27 MG Oral Tab CR 30 tablet 0     Sig: Take 1 tablet (27 mg total) by mouth daily.    methylphenidate ER (CONCERTA) 27 MG Oral Tab CR 30 tablet 0     Sig: Take 1 tablet (27 mg total) by mouth daily.    methylphenidate ER (CONCERTA) 27 MG Oral Tab CR 30 tablet 0     Sig: Take 1 tablet (27 mg total) by mouth daily.    ketoconazole 2 % External Cream 60 g 1     Sig: Apply 1 Application topically daily as needed.       Imaging & Consults:  None  1. Controlled type 2 diabetes mellitus without complication, without long-term current use of insulin (HCC)  Continue with eye exams yearly, foot exams every 2 months  Continue with checking feet daily, fasting blood sugars daily  Continue with trying to increase exercise goal is 3-4 times a week for 30 to 60 minutes  Continue with small frequent meals low-carb and weight loss attempts goal is 10 pound weight loss in the next 2 months  Continue with medications, atorvastatin 10 mg, Trulicity 3 mg weekly, metformin 1000 mg twice a day, losartan 100 mg  - Dulaglutide (TRULICITY) 3 MG/0.5ML Subcutaneous Solution Pen-injector; Inject 3 mg as directed every 7 days.  Dispense: 2 mL; Refill: 5  No family history or personal history of medullary thyroid cancer no FH MEN,  no personal history or family history of pancreatitis.  No present GERD symptoms or bowel movement  issues.  If needed use over-the-counter MiraLAX, stool softener and increase fiber if constipation symptoms.  Continue with trying to maintain water intake at least at 64 ounces per day  Small frequent meals with adequate protein goal would be 60 g/day and approximately 1800 ruben/day  If needs anesthesia no medication 1 week before anesthesia    2. Attention deficit hyperactivity disorder (ADHD), predominantly inattentive type  Refill on ADD medication provided patient tolerates well no side effects.  Patient is aware of risks including pulmonary hypertension.  Reviewed side effects including chest pain, shortness breath, dizziness, swelling hands and feet, tics, tremors, sleep disturbance, appetite suppression or mood changes such as anxiety or depression.     - methylphenidate ER (CONCERTA) 27 MG Oral Tab CR; Take 1 tablet (27 mg total) by mouth daily.  Dispense: 30 tablet; Refill: 0  - methylphenidate ER (CONCERTA) 27 MG Oral Tab CR; Take 1 tablet (27 mg total) by mouth daily.  Dispense: 30 tablet; Refill: 0  - methylphenidate ER (CONCERTA) 27 MG Oral Tab CR; Take 1 tablet (27 mg total) by mouth daily.  Dispense: 30 tablet; Refill: 0    3. Mixed hyperlipidemia  Reviewed medication benefits and side effects.     - atorvastatin 10 MG Oral Tab; Take 1 tablet (10 mg total) by mouth daily.  Dispense: 90 tablet; Refill: 3    4. Coronary artery disease due to lipid rich plaque  As above LDL goal is less than 70 presently LDL at 52  Patient has mild asymptomatic coronary disease of the left anterior descending artery    5. Essential hypertension with goal blood pressure less than 130/80  Reviewed medication benefits and side effects.   Continue with blood pressure management with amlodipine 5 mg, losartan 100 mg and hydrochlorothiazide 25 mg blood pressure under good control.  - losartan 100 MG Oral Tab; Take 1 tablet (100 mg total) by mouth daily.  Dispense: 90 tablet; Refill: 1  - hydroCHLOROthiazide 25 MG Oral Tab;  Take 1 tablet (25 mg total) by mouth every morning.  Dispense: 90 tablet; Refill: 1    6. NE (generalized anxiety disorder)/depression in remission  Anxiety is under good control with sertraline 50 mg well-tolerated side effects  No depression symptoms stated his mood is good it is no longer doing counseling stopped about 2 years ago since his mood has been stable.  Did find treating the ADD did help his mood as well  - sertraline 50 MG Oral Tab; Take 1 tablet (50 mg total) by mouth daily.  Dispense: 90 tablet; Refill: 1    7. Medication management  As above    8. Seborrheic dermatitis  Reviewed medication benefits and side effects.     - ketoconazole 2 % External Cream; Apply 1 Application topically daily as needed.  Dispense: 60 g; Refill: 1    Time spent was 45 minutes more than 50% was spent on counseling regarding medical conditions and treatment.  Rest of time was spent reviewing chart, reviewing blood work and radiology tests.     The patient indicates understanding of these issues and agrees to the plan.  The patient is asked to return in follow-up for Medicare annual wellness in July.

## 2024-07-01 ENCOUNTER — OFFICE VISIT (OUTPATIENT)
Dept: FAMILY MEDICINE CLINIC | Facility: CLINIC | Age: 74
End: 2024-07-01
Payer: MEDICARE

## 2024-07-01 VITALS
BODY MASS INDEX: 32.76 KG/M2 | OXYGEN SATURATION: 98 % | HEART RATE: 74 BPM | TEMPERATURE: 99 F | DIASTOLIC BLOOD PRESSURE: 74 MMHG | SYSTOLIC BLOOD PRESSURE: 122 MMHG | RESPIRATION RATE: 16 BRPM | HEIGHT: 65.35 IN | WEIGHT: 199 LBS

## 2024-07-01 DIAGNOSIS — F41.1 GAD (GENERALIZED ANXIETY DISORDER): ICD-10-CM

## 2024-07-01 DIAGNOSIS — G47.33 OBSTRUCTIVE SLEEP APNEA: ICD-10-CM

## 2024-07-01 DIAGNOSIS — M20.42 HAMMERTOES OF BOTH FEET: ICD-10-CM

## 2024-07-01 DIAGNOSIS — B35.1 FUNGAL INFECTION OF TOENAIL: ICD-10-CM

## 2024-07-01 DIAGNOSIS — I10 ESSENTIAL HYPERTENSION WITH GOAL BLOOD PRESSURE LESS THAN 130/80: ICD-10-CM

## 2024-07-01 DIAGNOSIS — F90.0 ATTENTION DEFICIT HYPERACTIVITY DISORDER (ADHD), PREDOMINANTLY INATTENTIVE TYPE: ICD-10-CM

## 2024-07-01 DIAGNOSIS — I25.83 CORONARY ARTERY DISEASE DUE TO LIPID RICH PLAQUE: ICD-10-CM

## 2024-07-01 DIAGNOSIS — G47.33 OSA ON CPAP: ICD-10-CM

## 2024-07-01 DIAGNOSIS — Z00.00 ENCOUNTER FOR ANNUAL HEALTH EXAMINATION: Primary | ICD-10-CM

## 2024-07-01 DIAGNOSIS — M20.41 HAMMERTOES OF BOTH FEET: ICD-10-CM

## 2024-07-01 DIAGNOSIS — C61 PROSTATE CANCER (HCC): ICD-10-CM

## 2024-07-01 DIAGNOSIS — E66.09 CLASS 1 OBESITY DUE TO EXCESS CALORIES WITH SERIOUS COMORBIDITY AND BODY MASS INDEX (BMI) OF 32.0 TO 32.9 IN ADULT: ICD-10-CM

## 2024-07-01 DIAGNOSIS — I67.9 CEREBROVASCULAR SMALL VESSEL DISEASE: ICD-10-CM

## 2024-07-01 DIAGNOSIS — G25.0 BENIGN ESSENTIAL TREMOR: ICD-10-CM

## 2024-07-01 DIAGNOSIS — M51.36 LUMBAR DEGENERATIVE DISC DISEASE: ICD-10-CM

## 2024-07-01 DIAGNOSIS — E06.3 HYPOTHYROIDISM DUE TO HASHIMOTO'S THYROIDITIS: ICD-10-CM

## 2024-07-01 DIAGNOSIS — R26.89 BALANCE PROBLEM: ICD-10-CM

## 2024-07-01 DIAGNOSIS — D22.9 MULTIPLE PIGMENTED NEVI: ICD-10-CM

## 2024-07-01 DIAGNOSIS — E11.9 CONTROLLED TYPE 2 DIABETES MELLITUS WITHOUT COMPLICATION, WITHOUT LONG-TERM CURRENT USE OF INSULIN (HCC): ICD-10-CM

## 2024-07-01 DIAGNOSIS — K76.0 FATTY LIVER: ICD-10-CM

## 2024-07-01 DIAGNOSIS — I44.4 LEFT ANTERIOR FASCICULAR BLOCK (LAFB): ICD-10-CM

## 2024-07-01 DIAGNOSIS — Z12.11 COLON CANCER SCREENING: ICD-10-CM

## 2024-07-01 DIAGNOSIS — F32.5 DEPRESSION, MAJOR, IN REMISSION (HCC): ICD-10-CM

## 2024-07-01 DIAGNOSIS — I73.9 SMALL VESSEL DISEASE (HCC): ICD-10-CM

## 2024-07-01 DIAGNOSIS — I25.10 CORONARY ARTERY DISEASE DUE TO LIPID RICH PLAQUE: ICD-10-CM

## 2024-07-01 DIAGNOSIS — Z80.0 FH: COLON CANCER IN FIRST DEGREE RELATIVE <60 YEARS OLD: ICD-10-CM

## 2024-07-01 DIAGNOSIS — Z97.4 DOES USE HEARING AID: ICD-10-CM

## 2024-07-01 DIAGNOSIS — E78.2 MIXED HYPERLIPIDEMIA: ICD-10-CM

## 2024-07-01 DIAGNOSIS — M15.9 PRIMARY OSTEOARTHRITIS INVOLVING MULTIPLE JOINTS: ICD-10-CM

## 2024-07-01 DIAGNOSIS — Z96.641 HISTORY OF RIGHT HIP REPLACEMENT: ICD-10-CM

## 2024-07-01 DIAGNOSIS — E03.8 HYPOTHYROIDISM DUE TO HASHIMOTO'S THYROIDITIS: ICD-10-CM

## 2024-07-01 PROBLEM — M19.072 ARTHRITIS OF FOOT, LEFT: Status: RESOLVED | Noted: 2022-08-23 | Resolved: 2024-07-01

## 2024-07-01 PROBLEM — G47.10 HYPERSOMNIA: Status: RESOLVED | Noted: 2022-11-07 | Resolved: 2024-07-01

## 2024-07-01 PROBLEM — L84 PRE-ULCERATIVE CALLUSES: Status: RESOLVED | Noted: 2020-03-03 | Resolved: 2024-07-01

## 2024-07-01 PROBLEM — R29.898 WEAKNESS OF RIGHT LEG: Status: RESOLVED | Noted: 2023-09-14 | Resolved: 2024-07-01

## 2024-07-01 PROBLEM — R40.4: Status: RESOLVED | Noted: 2023-09-14 | Resolved: 2024-07-01

## 2024-07-01 PROBLEM — M62.830 LUMBAR PARASPINAL MUSCLE SPASM: Status: RESOLVED | Noted: 2023-07-27 | Resolved: 2024-07-01

## 2024-07-01 PROBLEM — M54.50 LUMBAR PAIN: Status: RESOLVED | Noted: 2023-09-14 | Resolved: 2024-07-01

## 2024-07-01 PROBLEM — R25.1 TREMOR OF BOTH OUTSTRETCHED HANDS: Status: RESOLVED | Noted: 2023-03-14 | Resolved: 2024-07-01

## 2024-07-01 PROBLEM — E11.65 UNCONTROLLED TYPE 2 DIABETES MELLITUS WITH HYPERGLYCEMIA (HCC): Status: RESOLVED | Noted: 2024-03-18 | Resolved: 2024-07-01

## 2024-07-01 NOTE — PROGRESS NOTES
Subjective:   Yifan Leonardo is a 74 year old male who presents for a Medicare Subsequent Annual Wellness visit (Pt already had Initial Annual Wellness) and scheduled follow up of multiple significant but stable problems.     Patient was just recently seen on 2024 to discuss diabetes, ADD, NE, seborrheic dermatitis, hyperlipidemia, hypertension/coronary artery disease    Social history patient lives with his wife, enjoys spending time with his grandchildren retired from working as a  Tobacco hx: < 4 pack years, quit  exercises once a week plans on increasing to 3 times a week       Specialists   GI  Dr. Torrez colonoscopy due 2026 (has new family history father had colon cancer found on autopsy he passed away at 55 needs another referral for colonoscopy)  Optometrist Dr. Otto Hathaway done 2024 no diabetic retinopathy  ENT Dr. Faustin hearing aids [facility]  Urologist Dr Elizabeth prostate cancer  Sleep specialist Dr. Santizo  Podiatrist Dr. Michael     Family history Father  from an infection but autopsy confirmed colon cancer  at 55;  sister  from ovarian cancer, MGM and MGF acute MI  Advanced directives waiting on  copy recently done     Imaging:  MRI brain, MRI lumbar spine: Osteoarthritic changes lumbar spine and chronic microvascular ischemia  2023 no significant carotid artery stenosis  2023 heart scan  total 138 over read of noncardiac areas fatty infiltrate of liver and extensive multilevel degenerative disc disease of thoracic spine  2023 2D echocardiogram ordered not completed      Immunizations Shingrix completed, Tdap 7/15/2011, RSV 10/11/2023, pneumonia vaccine completed, COVID-vaccine 2024, influenza 2023    Dental exams UTD   Eye exams UTD  PHQ9 no depression PHQ-9 is at a 1  Smoking history Tobacco hx: < 4 pack years, quit    Alcohol none  Sleep Apnea  On CPAP follow sleep specialist uses it  6-7 hours a night  Exercise just once a week plans on going back to the fitness center 3 times a week   DIet good low carbohydrate he has not been as healthy this summer        Controlled type 2 diabetes mellitus with hyperglycemia (HCC)  6/17/2024 hemoglobin A1c 6.2 improved from 3/15/2024 hemoglobin A1c 7.1  3/15/2024 urine microalbumin creatinine ratio normal, LDL 52 and GFR 87  Diabetic medications atorvastatin 10 mg no muscle aches, Trulicity 3 mg weekly, losartan 100 mg daily, metformin 1000 mg twice a day no diarrhea  Restarted Trulicity past 3 months prior was off of it due to cost price was more the first month then went down the second and third month.  Notices a great improvement with his appetite.  No GERD or constipation.  Fasting blood sugars between    Stops eating past 6 PM  CPAP using it nightly now for 6 hours  Dilated eye exam completed 5/28/2024 normal   Podiatrist UTD every 51 days trimming and feet are good Dr Alec      Exercise reclined elliptical for 1 hour 1-2 times a week; will be going to the gym starting in July 3 times a week  Balance is better no falls  besides tripped over something 2 weeks ago left side contusion no breathing issues or pain with movement able to exercise with no difficulty      No family history or personal history of medullary thyroid cancer no FH MEN,  no personal history or family history of pancreatitis.    No present GERD symptoms or bowel movement issues.     Feet no numbness no tingling sees podiatrist every 2 months      Diet mostly home-cooked food limiting carbohydrates mostly fruit does not like vegetables but overall is eating healthy occasionally eats out     Attention deficit hyperactivity disorder (ADHD), predominantly inattentive type/NE/depression in remission  He denies any side effects including no chest pain, shortness breath, dizziness, swelling hands and feet, tics,  sleep disturbance or appetite suppression. Mood has been good with no  signs of anxiety or depression.  Concerta 27 mg works well for tasks starting, staying and finishing his attention span is better with Concerta versus Focalin and energy improved.  Mood is doing good with no anxiety or depression does take sertraline 50 mg daily well-tolerated no side effects request refill on sertraline.  Wants to stay on the sertraline to help with his mood stability especially the anxiety which is much better  No depression symptoms states that he has good interest and pleasure in doing things  No counseling for 2 years      Does notice a very slight tremor in left second finger tremor has not changed discussed with neurologist no concerns has not noticed it much recently just very mild .     Seborrheic dermatitis  Topical ketoconazole cream uses 1-2 times a day for upper forehead has worked great with no side effects     ---Hyperlipidemia/hypertension/CAD as seen on heart scan/Left anterior fascicular block (LAFB)  Amlodipine 5 mg, atorvastatin 10 mg, hydrochlorothiazide 25 mg, losartan 100 mg patient tolerates medications well no side effects.  3/15/2024 LDL 52 and GFR 87 with normal electrolytes  9/20/2023 no significant stenosis bilateral  9/19/2023  total score 138 mild CAD  9/30/2020 normal exercise stress test  Denies any chest pain, SOB, dizziness or fainting.  No swelling in the hands or feet.  No symptoms of PAD.  Patient has been exercising regularly and not having any difficulties with exercise tolerance up to an hour on the elliptical 2 times a week  Was a 5 pack/year smoker quit 1973    Obstructive sleep apnea  Using CPAP 6 to 7 hours a night  Hypothyroidism due to Hashimoto's thyroiditis  Last TSH 3/15/2024 @ 3.2    Lumbar degenerative disc disease  Primary osteoarthritis involving multiple joints   balance problem  Worked with physical therapy did improve his gait and mobility history of right hip replacement  Has hammertoes of both feet sees podiatrist using orthotics and  diabetic shoes    Benign essential tremor  Cerebrovascular small vessel disease/small vessel disease  Seen by neurologist optimal diabetic, hypertension and hyperlipidemia controlled needed  Goals presently are being met  Small vessel disease is seen on MRI brain  Class 1 obesity due to excess calories with serious comorbidity and body mass index (BMI) of 32.0 to 32.9 in adult  Needs improvement and exercise has improvement on diet with Trulicity eating less food and is losing weight    Prostate cancer (HCC)  Managed by urologist last seen 4/1/2024  States urination is normal is taking Flomax  XRT completed 2/21/22  - pBx 11/23/21: 47.3 g, + 4/12 with < 5 and 15% GG2 (RLA, RLM); 20% and 30% GG1 RA, RM.  - Oncotype GPS 22 - c/w favorable intermediate risk disease, likelihood adverse pathology at time of RP is 33% with 4% and 1 % chance of mets and CaP death within 10 y following RP.  Hammertoes of both feet  Fungal infection of toenail  Follows Dr. Michael podiatrist      History/Other:   Fall Risk Assessment:   He has been screened for Falls and is High Risk. Fall Prevention information provided to patient in After Visit Summary.    Do you feel unsteady when standing or walking?: No  Do you worry about falling?: Yes  Have you fallen in the past year?: Yes  How many times have you fallen?: 1  Were you injured?: Yes     Cognitive Assessment:   He had a completely normal cognitive assessment - see flowsheet entries     Functional Ability/Status:   Yifan Leonardo has some abnormal functions as listed below:  He has Hearing problems based on screening of functional status.He has Vision problems based on screening of functional status. He has Walking problems based on screening of functional status.       Depression Screening (PHQ-2/PHQ-9): PHQ-2 SCORE: 0  , done 7/1/2024   Feeling tired or having little energy: 1    If you checked off any problems, how difficult have these problems made it for you to do your  work, take care of things at home, or get along with other people?: Not difficult at all    Last [place] Suicide Screening on 7/1/2024 was No Risk.         Advanced Directives:   He does NOT have a Living Will. [Do you have a living will?: No]  He does NOT have a Power of  for Health Care. [Do you have a healthcare power of ?: No]  Patient has Advance Care Planning documents but we do not have a copy in EMR. Discussed Advanced Care Planning with patient and instructed patient to get our office a copy to be scanned into EMR.      Patient Active Problem List   Diagnosis    Obstructive sleep apnea    Primary osteoarthritis involving multiple joints    Varicose veins of left leg with edema    Mixed hyperlipidemia    Controlled type 2 diabetes mellitus without complication, without long-term current use of insulin (HCC)    Essential hypertension with goal blood pressure less than 130/80    Does use hearing aid    History of right hip replacement    Pre-ulcerative calluses    Obesity (BMI 30.0-34.9)    Attention deficit hyperactivity disorder (ADHD), predominantly inattentive type    Depression, major, in remission (HCC)    Hypothyroidism due to Hashimoto's thyroiditis    Prostate cancer (HCC)    Fungal infection of toenail    Medication management    NE (generalized anxiety disorder)    Cerebrovascular small vessel disease    Arthritis of foot, left    Hypersomnia    Tremor of both outstretched hands    Left anterior fascicular block (LAFB)    DICK on CPAP    Lumbar paraspinal muscle spasm    Hyporesponsive episode    Lumbar pain    Balance problem    Weakness of right leg    Small vessel disease (HCC)    Flat foot    Hammertoes of both feet    Benign essential tremor    Uncontrolled type 2 diabetes mellitus with hyperglycemia (HCC)    Lumbar degenerative disc disease    Coronary artery disease due to lipid rich plaque    Fatty liver     Allergies:  He is allergic to sulfa antibiotics and zestril  [lisinopril].    Current Medications:  Outpatient Medications Marked as Taking for the 7/1/24 encounter (Office Visit) with Yandy Huston PA-C   Medication Sig    Dulaglutide (TRULICITY) 3 MG/0.5ML Subcutaneous Solution Pen-injector Inject 3 mg as directed every 7 days.    atorvastatin 10 MG Oral Tab Take 1 tablet (10 mg total) by mouth daily.    losartan 100 MG Oral Tab Take 1 tablet (100 mg total) by mouth daily.    sertraline 50 MG Oral Tab Take 1 tablet (50 mg total) by mouth daily.    methylphenidate ER (CONCERTA) 27 MG Oral Tab CR Take 1 tablet (27 mg total) by mouth daily.    [START ON 7/21/2024] methylphenidate ER (CONCERTA) 27 MG Oral Tab CR Take 1 tablet (27 mg total) by mouth daily.    [START ON 8/20/2024] methylphenidate ER (CONCERTA) 27 MG Oral Tab CR Take 1 tablet (27 mg total) by mouth daily.    amLODIPine 5 MG Oral Tab Take 1 tablet (5 mg total) by mouth daily.    tamsulosin 0.4 MG Oral Cap Take 1 capsule (0.4 mg total) by mouth every evening. Take 1/2 hour following the same meal each day    metFORMIN HCl 1000 MG Oral Tab Take 1 tablet (1,000 mg total) by mouth 2 (two) times daily with meals.    FreeStyle Lancets Does not apply Misc TEST ONCE DAILY AS DIRECTED    Glucose Blood (FREESTYLE LITE TEST) In Vitro Strip TEST ONCE DAILY AS DIRECTED    LEVOTHYROXINE 50 MCG Oral Tab TAKE 1 TABLET(50 MCG) BY MOUTH BEFORE BREAKFAST    Meloxicam 15 MG Oral Tab Take 1 tablet (15 mg total) by mouth daily. Take once daily for 4 weeks    lidocaine 5 % External Patch PLACE 1 PATCH ONTO THE SKIN DAILY. PUT ON FOR 12 HOURS THEN OFF FOR 12 HOURS (Patient taking differently: PLACE 1 PATCH ONTO THE SKIN DAILY. PUT ON FOR 12 HOURS THEN OFF FOR 12 HOURS as needed)    Probiotic Product (PROBIOTIC PEARLS) Oral Cap Take 1 capsule by mouth daily.    Turmeric 500 MG Oral Cap Take 1 capsule by mouth daily.    Bioflavonoid Products (BIOFLEX) Oral Tab Take 1 tablet by mouth daily.    Cholecalciferol (VITAMIN D3) 1000 UNITS  Oral Cap Take 1 tablet by mouth daily.    Cinnamon 500 MG Oral Cap Take 1 capsule by mouth 2 (two) times daily.      Omega-3 Fatty Acids (FISH OIL TRIPLE STRENGTH) 1400 MG Oral Cap Take 1 capsule by mouth daily.    Multiple Vitamins-Minerals (CENTRUM SILVER ULTRA MENS) Oral Tab Take 1 tablet by mouth daily.    ZYRTEC 10 MG OR TABS Take 1 mg by mouth daily.       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  Surgical History:  He  has a past surgical history that includes colonoscopy (01/01/05); leg/ankle surgery proc unlisted (01/01/60); hip replacement surgery (01/01/93); knee arthroscp harv (11/15/10); colonoscopy; other surgical history; knee replacement surgery (2011); hernia surgery (01/01/08); hernia surgery (10/10); tonsillectomy; colonoscopy,diagnostic (04/25/2016); and radiation (01/2022).   Family History:  His family history includes Colon Cancer in his father; Dementia in his mother; Heart Attack in his maternal grandfather and maternal grandmother; Heart Disorder in his maternal grandmother; Hypertension in his maternal grandmother and mother; No Known Problems in his son, son, and son; Ovarian Cancer in his sister; hypertension in his mother.  Social History:  He   reports that he quit smoking about 51 years ago. His smoking use included cigarettes. He started smoking about 56 years ago. He has a 7.5 pack-year smoking history. He has never used smokeless tobacco. He reports that he does not drink alcohol and does not use drugs.    Tobacco:  He smoked tobacco in the past but quit greater than 12 months ago.  Social History     Tobacco Use   Smoking Status Former    Current packs/day: 0.00    Average packs/day: 1.5 packs/day for 5.0 years (7.5 ttl pk-yrs)    Types: Cigarettes    Start date: 1968    Quit date: 1973    Years since quittin.1   Smokeless Tobacco Never          CAGE Alcohol Screen:   CAGE screening score of 0 on 2024, showing low risk of alcohol abuse.      Patient Care Team:  Itzel Willingham DO as PCP - General (Family Practice)  Yandy Huston PA-C as Physician Assistant (Family Practice)  Nigel Cisneros, PT as Physical Therapist (Physical Therapy)  Sarita Land PT as Physical Therapist (Physical Therapy)  Des Gonzalez MD (Radiation Oncology)  Sheldon Camilo MD as Consulting Physician (NEUROLOGY)    Review of Systems  GENERAL: feels well otherwise  SKIN: denies any unusual skin lesions  EYES: denies blurred vision or double vision  HEENT: denies nasal congestion, sinus pain or ST  LUNGS: denies shortness of breath with exertion  CARDIOVASCULAR: denies chest pain on exertion  GI: denies abdominal pain, denies heartburn  : 1 per night nocturia, no complaint of urinary incontinence  MUSCULOSKELETAL: denies back pain  NEURO: denies headaches  PSYCHE: denies depression or anxiety  HEMATOLOGIC: denies hx of anemia  ENDOCRINE: denies thyroid history  ALL/ASTHMA: denies hx of allergy or asthma    Objective:   Physical Exam  General Appearance:  Alert, cooperative, no distress, appears stated age   Head:  Normocephalic, without obvious abnormality, atraumatic   Eyes:  PERRL, conjunctiva/corneas clear, EOM's intact, both eyes   Ears:   Normal TM's and external ear canals, both ears   Nose: Nares normal, septum midline, mucosa normal, no drainage or sinus tenderness   Throat: Lips, mucosa, and tongue normal; teeth and gums normal   Neck: Supple, symmetrical, trachea midline, no adenopathy, thyroid: not enlarged, symmetric, no tenderness/mass/nodules, no carotid bruit or JVD   Back:   Symmetric, no curvature, ROM normal, no CVA tenderness   Lungs:   Clear to auscultation bilaterally, respirations unlabored   Chest Wall:  No tenderness or deformity   Heart:  Regular rate and rhythm, S1, S2 normal, no murmur, rub or gallop   Abdomen:   Soft, non-tender, bowel sounds active all four quadrants,  no masses, no organomegaly           Extremities: Extremities normal, atraumatic, no cyanosis or edema   Pulses: 2+ and symmetric   Skin: Skin color, texture, turgor normal, no rashes or lesions   Lymph nodes: Cervical, supraclavicular, and axillary nodes normal   Neurologic: Normal     /74   Pulse 74   Temp 98.6 °F (37 °C) (Temporal)   Resp 16   Ht 5' 5.35\" (1.66 m)   Wt 199 lb (90.3 kg)   SpO2 98%   BMI 32.76 kg/m²  Estimated body mass index is 32.76 kg/m² as calculated from the following:    Height as of this encounter: 5' 5.35\" (1.66 m).    Weight as of this encounter: 199 lb (90.3 kg).    Medicare Hearing Assessment:   Hearing Screening    Screening Method: Finger Rub  Finger Rub Result: Fail (Comment: Failed with hearing aids in)               Assessment & Plan:   Yifan Leonardo is a 74 year old male who presents for a Medicare Assessment.     1. Encounter for annual health examination (Primary)  Forms given including DNR POLST form, power of  form and living will.  Fill these out and return them to clinic or forward through [facility].  Dental exams every 6 months  Eye exams yearly or as directed by eye specialist  Dermatological exams yearly  Follow-up with specialists as directed  Healthy diet avoiding processed foods focus  on high-fiber with fruits, vegetables and lean protein.  Exercise regularly as tolerated both aerobic and weightbearing.  Sleep goal 8 hours per night practice good sleep hygiene.  Schedule for further testing as discussed during office visit.  Stay updated on vaccinations including  COVID-19 vaccine, shingles vaccine and tetanus vaccine (every 10 years) at pharmacy.  Presently is due for vaccine at pharmacy tetanus     2. Controlled type 2 diabetes mellitus without complication, without long-term current use of insulin ([facility])  -     CARD ECHO 2D DOPPLER (CPT=93306); Future; Expected date: 07/01/2024  Continue with eye exams yearly, foot exams every 2 months  Continue with checking feet daily, fasting blood sugars daily  Continue with trying to increase exercise goal is 3-4 times a week for 30 to 60 minutes  Continue with small frequent meals low-carb and weight loss attempts goal is 10 pound weight loss in the next 2 months  Continue with medications, atorvastatin 10 mg, Trulicity 3 mg weekly, metformin 1000 mg twice a day, losartan 100 mg  -   No family history or personal history of medullary thyroid cancer no FH MEN,  no personal history or family history of pancreatitis.  No present GERD symptoms or bowel movement issues.  If needed use over-the-counter MiraLAX, stool softener and increase fiber if constipation symptoms.  Continue with trying to maintain water intake at least at 64 ounces per day  Small frequent meals with adequate protein goal would be 60 g/day and approximately 1800 ruben/day  If needs anesthesia no medication 1 week before anesthesia          3. Mixed hyperlipidemia  -     CARD ECHO STRESS ECHO/REST AND STRESS(CPT=93350/27737 DMG 20305); Future; Expected date: 07/01/2024  -     CARD ECHO 2D DOPPLER (CPT=93306); Future; Expected date: 07/01/2024  Continue with atorvastatin 10 mg daily LDL presently at 52 goal is less than 70  4. Essential hypertension with goal blood pressure less than 130/80  -      CARD ECHO STRESS ECHO/REST AND STRESS(CPT=93350/50396 DMG 48231); Future; Expected date: 07/01/2024  -     CARD ECHO 2D DOPPLER (CPT=93306); Future; Expected date: 07/01/2024  Blood pressure under good control with losartan 100 mg, amlodipine 5 mg and hydrochlorothiazide 25 mg  5. Coronary artery disease due to lipid rich plaque  -     CARD ECHO STRESS ECHO/REST AND STRESS(CPT=93350/82452 DMG 04156); Future; Expected date: 07/01/2024  6. Left anterior fascicular block (LAFB)  7. Obstructive sleep apnea  -     CARD ECHO STRESS ECHO/REST AND STRESS(CPT=93350/07138 DMG 57887); Future; Expected date: 07/01/2024  -     CARD ECHO 2D DOPPLER (CPT=93306); Future; Expected date: 07/01/2024  8. NE (generalized anxiety disorder)  9. Attention deficit hyperactivity disorder (ADHD), predominantly inattentive type  -     CARD ECHO STRESS ECHO/REST AND STRESS(CPT=93350/02975 DMG 62963); Future; Expected date: 07/01/2024  -     CARD ECHO 2D DOPPLER (CPT=93306); Future; Expected date: 07/01/2024  Present under good control with Concerta 27 mg well-tolerated no side effects  10. Depression, major, in remission (HCC)  Depression in remission on sertraline 50 mg  11. Hypothyroidism due to Hashimoto's thyroiditis  Continue with levothyroxine 50 mcg  12. Lumbar degenerative disc disease  13. Primary osteoarthritis involving multiple joints  Weight loss needed continue with low sugar and low-fat diet continue with Trulicity.  Increase exercise to 3 times a week  14. Benign essential tremor  15. Cerebrovascular small vessel disease/small vessel disease  Encouraged weight loss continued management with Trulicity for diabetic control blood sugars much improved, hypertension control and hyperlipidemia controlled with medication  16. Class 1 obesity due to excess calories with serious comorbidity and body mass index (BMI) of 32.0 to 32.9 in adult  Weight loss discussed patient should start exercising daily for 30-40 minutes also reducing  all carbohydrates both simple and complex.  Can eat fruits and vegetables and small frequent meals of healthy combinations of protein and carbohydrate.  Avoiding packaged/processed.     17. Prostate cancer (HCC)  Continue with follow-ups with urologist  18. Balance problem  Fall risk discussed balance did improve with PT  19. DICK on CPAP  Continue with CPAP  20. Fungal infection of toenail  21. Hammertoes of both feet  Continue with orthotics and diabetic shoes and follow-ups every 3 months with podiatrist  22. History of right hip replacement  23. Multiple pigmented nevi  -     DERM - INTERNAL  24. Fatty liver  Weight loss continue with avoiding alcohol  25. FH: colon cancer in first degree relative <60 years old  -     Gastro Referral - In Network  26. Colon cancer screening  -     Gastro Referral - In Network    The patient indicates understanding of these issues and agrees to the plan.  Consult ordered.  Continue with current treatment plan.  Further testing ordered.  Imaging studies ordered.  Reinforced healthy diet, lifestyle, and exercise.      Return in 3 months (on 10/1/2024).   Hemoglobin A1c will be done in the office at that time.  Yandy Huston PA-C, 7/1/2024     Supplementary Documentation:   General Health:  In the past six months, have you lost more than 10 pounds without trying?: 2 - No  Has your appetite been poor?: No  Type of Diet: Balanced  How does the patient maintain a good energy level?: Appropriate Exercise;Daily Walks;Other  How would you describe your daily physical activity?: Light  How would you describe your current health state?: Good  How do you maintain positive mental well-being?: Social Interaction;Puzzles;Games;Visiting Family  On a scale of 0 to 10, with 0 being no pain and 10 being severe pain, what is your pain level?: 2 - (Mild)  In the past six months, have you experienced urine leakage?: 0-No  At any time do you feel concerned for the safety/well-being of yourself  and/or your children, in your home or elsewhere?: No  Have you had any immunizations at another office such as Influenza, Hepatitis B, Tetanus, or Pneumococcal?: No          Yifan Leonardo's SCREENING SCHEDULE   Tests on this list are recommended by your physician but may not be covered, or covered at this frequency, by your insurer.   Please check with your insurance carrier before scheduling to verify coverage.   PREVENTATIVE SERVICES FREQUENCY &  COVERAGE DETAILS LAST COMPLETION DATE   Diabetes Screening    Fasting Blood Sugar / Glucose    One screening every 12 months if never tested or if previously tested but not diagnosed with pre-diabetes   One screening every 6 months if diagnosed with pre-diabetes Lab Results   Component Value Date     (H) 03/15/2024        Cardiovascular Disease Screening    Lipid Panel  Cholesterol  Lipoprotein (HDL)  Triglycerides Covered every 5 years for all Medicare beneficiaries without apparent signs or symptoms of cardiovascular disease Lab Results   Component Value Date    CHOLEST 126 03/15/2024    HDL 52 03/15/2024    LDL 52 03/15/2024    TRIG 125 03/15/2024         Electrocardiogram (EKG)   Covered if needed at Welcome to Medicare, and non-screening if indicated for medical reasons 08/04/2022      Ultrasound Screening for Abdominal Aortic Aneurysm (AAA) Covered once in a lifetime for one of the following risk factors    Men who are 65-75 years old and have ever smoked    Anyone with a family history -     Colorectal Cancer Screening  Covered for ages 50-85; only need ONE of the following:    Colonoscopy   Covered every 10 years    Covered every 2 years if patient is at high risk or previous colonoscopy was abnormal 04/25/2016    Health Maintenance   Topic Date Due    Colorectal Cancer Screening  04/25/2026       Flexible Sigmoidoscopy   Covered every 4 years -    Fecal Occult Blood Test Covered annually -   Prostate Cancer Screening    Prostate-Specific  Antigen (PSA) Annually Lab Results   Component Value Date    PSA 0.22 04/01/2024     Health Maintenance   Topic Date Due    PSA  04/01/2026      Immunizations    Influenza Covered once per flu season  Please get every year 09/13/2023  No recommendations at this time    Pneumococcal Each vaccine ([id number] & [id number]) covered once after 65 Prevnar 13: 07/28/2016    [id number]: 11/10/2017     No recommendations at this time    Hepatitis B One screening covered for patients with certain risk factors   -  No recommendations at this time    Tetanus Toxoid Not covered by Medicare Part B unless medically necessary (cut with metal); may be covered with your pharmacy prescription benefits -    Tetanus, Diptheria and Pertusis TD and TDaP Not covered by Medicare Part B -  No recommendations at this time    Zoster Not covered by Medicare Part B; may be covered with your pharmacy  prescription benefits 07/15/2011  No recommendations at this time     Diabetes      Hemoglobin A1C Annually; if last result is elevated, may be asked to retest more frequently.    Medicare covers every 3 months Lab Results   Component Value Date     (H) 06/17/2024    A1C 6.2 (H) 06/17/2024       No recommendations at this time    Creat/alb ratio Annually Lab Results   Component Value Date    MICROALBCREA 16.6 03/15/2024    MALBCRECALC 11.9 06/06/2014       LDL Annually Lab Results   Component Value Date    LDL 52 03/15/2024       Dilated Eye Exam Annually Last Diabetic Eye Exam:  Last Dilated Eye Exam: 05/28/24  Eye Exam shows Diabetic Retinopathy?: No       Annual Monitoring of Persistent Medications (ACE/ARB, digoxin diuretics, anticonvulsants)    Potassium Annually Lab Results   Component Value Date    K 4.0 03/15/2024         Creatinine   Annually Lab Results   Component Value Date    CREATSERUM 0.93 03/15/2024         BUN Annually Lab Results   Component Value Date    BUN 14 03/15/2024       Drug Serum Conc Annually No results found  for: \"DIGOXIN\", \"DIG\", \"VALP\"

## 2024-07-01 NOTE — PATIENT INSTRUCTIONS
Yifan Leonardo's SCREENING SCHEDULE   Tests on this list are recommended by your physician but may not be covered, or covered at this frequency, by your insurer.   Please check with your insurance carrier before scheduling to verify coverage.   PREVENTATIVE SERVICES FREQUENCY &  COVERAGE DETAILS LAST COMPLETION DATE   Diabetes Screening    Fasting Blood Sugar / Glucose    One screening every 12 months if never tested or if previously tested but not diagnosed with pre-diabetes   One screening every 6 months if diagnosed with pre-diabetes Lab Results   Component Value Date     (H) 03/15/2024        Cardiovascular Disease Screening    Lipid Panel  Cholesterol  Lipoprotein (HDL)  Triglycerides Covered every 5 years for all Medicare beneficiaries without apparent signs or symptoms of cardiovascular disease Lab Results   Component Value Date    CHOLEST 126 03/15/2024    HDL 52 03/15/2024    LDL 52 03/15/2024    TRIG 125 03/15/2024         Electrocardiogram (EKG)   Covered if needed at Welcome to Medicare, and non-screening if indicated for medical reasons 08/04/2022      Ultrasound Screening for Abdominal Aortic Aneurysm (AAA) Covered once in a lifetime for one of the following risk factors   • Men who are 65-75 years old and have ever smoked   • Anyone with a family history -     Colorectal Cancer Screening  Covered for ages 50-85; only need ONE of the following:    Colonoscopy   Covered every 10 years    Covered every 2 years if patient is at high risk or previous colonoscopy was abnormal 04/25/2016    Health Maintenance   Topic Date Due   • Colorectal Cancer Screening  04/25/2026       Flexible Sigmoidoscopy   Covered every 4 years -    Fecal Occult Blood Test Covered annually -   Prostate Cancer Screening    Prostate-Specific Antigen (PSA) Annually Lab Results   Component Value Date    PSA 0.22 04/01/2024     Health Maintenance   Topic Date Due   • PSA  04/01/2026      Immunizations    Influenza  Covered once per flu season  Please get every year 09/13/2023  No recommendations at this time    Pneumococcal Each vaccine ([id number] & [id number]) covered once after 65 Prevnar 13: 07/28/2016    [id number]: 11/10/2017     No recommendations at this time    Hepatitis B One screening covered for patients with certain risk factors   -  No recommendations at this time    Tetanus Toxoid Not covered by Medicare Part B unless medically necessary (cut with metal); may be covered with your pharmacy prescription benefits -    Tetanus, Diptheria and Pertusis TD and TDaP Not covered by Medicare Part B -  No recommendations at this time    Zoster Not covered by Medicare Part B; may be covered with your pharmacy  prescription benefits 07/15/2011  No recommendations at this time     Diabetes      Hemoglobin A1C Annually; if last result is elevated, may be asked to retest more frequently.    Medicare covers every 3 months Lab Results   Component Value Date     (H) 06/17/2024    A1C 6.2 (H) 06/17/2024       No recommendations at this time    Creat/alb ratio Annually Lab Results   Component Value Date    MICROALBCREA 16.6 03/15/2024    MALBCRECALC 11.9 06/06/2014       LDL Annually Lab Results   Component Value Date    LDL 52 03/15/2024       Dilated Eye Exam Annually [unfilled]     Annual Monitoring of Persistent Medications (ACE/ARB, digoxin diuretics, anticonvulsants)    Potassium Annually Lab Results   Component Value Date    K 4.0 03/15/2024         Creatinine   Annually Lab Results   Component Value Date    CREATSERUM 0.93 03/15/2024         BUN Annually Lab Results   Component Value Date    BUN 14 03/15/2024       Drug Serum Conc Annually No results found for: \"DIGOXIN\", \"DIG\", \"VALP\"

## 2024-07-02 ENCOUNTER — OFFICE VISIT (OUTPATIENT)
Facility: LOCATION | Age: 74
End: 2024-07-02
Payer: MEDICARE

## 2024-07-02 DIAGNOSIS — H61.23 BILATERAL IMPACTED CERUMEN: Primary | ICD-10-CM

## 2024-07-02 PROCEDURE — 99214 OFFICE O/P EST MOD 30 MIN: CPT | Performed by: OTOLARYNGOLOGY

## 2024-07-02 PROCEDURE — 92504 EAR MICROSCOPY EXAMINATION: CPT | Performed by: OTOLARYNGOLOGY

## 2024-07-02 NOTE — PROGRESS NOTES
Yifan Leonardo is a 74 year old male. No chief complaint on file.    HPI:   74-year-old white male with history of frequent cerumen impactions he wears hearing aids and finds that the cerumen impacts the function of the hearing aids therefore we are seeing him about every 3 to 4 months for cerumen removal.  Denies otorrhea, otalgia, tinnitus,.  Current Outpatient Medications   Medication Sig Dispense Refill    Dulaglutide (TRULICITY) 3 MG/0.5ML Subcutaneous Solution Pen-injector Inject 3 mg as directed every 7 days. 2 mL 5    atorvastatin 10 MG Oral Tab Take 1 tablet (10 mg total) by mouth daily. 90 tablet 3    losartan 100 MG Oral Tab Take 1 tablet (100 mg total) by mouth daily. 90 tablet 1    hydroCHLOROthiazide 25 MG Oral Tab Take 1 tablet (25 mg total) by mouth every morning. 90 tablet 1    sertraline 50 MG Oral Tab Take 1 tablet (50 mg total) by mouth daily. 90 tablet 1    methylphenidate ER (CONCERTA) 27 MG Oral Tab CR Take 1 tablet (27 mg total) by mouth daily. 30 tablet 0    [START ON 7/21/2024] methylphenidate ER (CONCERTA) 27 MG Oral Tab CR Take 1 tablet (27 mg total) by mouth daily. 30 tablet 0    [START ON 8/20/2024] methylphenidate ER (CONCERTA) 27 MG Oral Tab CR Take 1 tablet (27 mg total) by mouth daily. 30 tablet 0    ketoconazole 2 % External Cream Apply 1 Application topically daily as needed. 60 g 1    amLODIPine 5 MG Oral Tab Take 1 tablet (5 mg total) by mouth daily. 90 tablet 1    tamsulosin 0.4 MG Oral Cap Take 1 capsule (0.4 mg total) by mouth every evening. Take 1/2 hour following the same meal each day 90 capsule 6    metFORMIN HCl 1000 MG Oral Tab Take 1 tablet (1,000 mg total) by mouth 2 (two) times daily with meals. 180 tablet 1    FreeStyle Lancets Does not apply Misc TEST ONCE DAILY AS DIRECTED 100 each 3    Glucose Blood (FREESTYLE LITE TEST) In Vitro Strip TEST ONCE DAILY AS DIRECTED 100 strip 3    LEVOTHYROXINE 50 MCG Oral Tab TAKE 1 TABLET(50 MCG) BY MOUTH BEFORE  BREAKFAST 90 tablet 2    Meloxicam 15 MG Oral Tab Take 1 tablet (15 mg total) by mouth daily. Take once daily for 4 weeks 30 tablet 0    lidocaine 5 % External Patch PLACE 1 PATCH ONTO THE SKIN DAILY. PUT ON FOR 12 HOURS THEN OFF FOR 12 HOURS (Patient taking differently: PLACE 1 PATCH ONTO THE SKIN DAILY. PUT ON FOR 12 HOURS THEN OFF FOR 12 HOURS as needed) 30 patch 5    Probiotic Product (PROBIOTIC PEARLS) Oral Cap Take 1 capsule by mouth daily.      Turmeric 500 MG Oral Cap Take 1 capsule by mouth daily.      Bioflavonoid Products (BIOFLEX) Oral Tab Take 1 tablet by mouth daily.      Cholecalciferol (VITAMIN D3) 1000 UNITS Oral Cap Take 1 tablet by mouth daily.      Cinnamon 500 MG Oral Cap Take 1 capsule by mouth 2 (two) times daily.        Omega-3 Fatty Acids (FISH OIL TRIPLE STRENGTH) 1400 MG Oral Cap Take 1 capsule by mouth daily.      Multiple Vitamins-Minerals (CENTRUM SILVER ULTRA MENS) Oral Tab Take 1 tablet by mouth daily.      ZYRTEC 10 MG OR TABS Take 1 mg by mouth daily.        Past Medical History:    Allergic rhinitis, cause unspecified    Chronic fatigue    Controlled type 2 diabetes mellitus without complication, without long-term current use of insulin (HCC)    Diabetes (HCC)    Diverticulosis    Dwight Torrez M.D.    Essential hypertension    Fall    Infective otitis externa, unspecified    Internal hemorrhoids    Dwight Torrez M.D.    Intestinal disaccharidase deficiencies and disaccharide malabsorption    Obesity    Overweight(278.02)    Pre-ulcerative corn or callous    Preoperative examination, unspecified    Prostate cancer (HCC)    Psychosexual dysfunction with inhibited sexual excitement    Sleep apnea    Uncontrolled type 2 diabetes mellitus with hyperglycemia (HCC)      Social History:  Social History     Socioeconomic History    Marital status:    Tobacco Use    Smoking status: Former     Current packs/day: 0.00     Average packs/day: 1.5 packs/day for 5.0 years (7.5 ttl pk-yrs)      Types: Cigarettes     Start date: 1968     Quit date: 1973     Years since quittin.1    Smokeless tobacco: Never   Vaping Use    Vaping status: Never Used   Substance and Sexual Activity    Alcohol use: No     Alcohol/week: 0.0 standard drinks of alcohol    Drug use: No   Other Topics Concern     Service No    Blood Transfusions No    Caffeine Concern Yes     Comment: 0-1 teas daily    Occupational Exposure No    Hobby Hazards No    Sleep Concern No    Stress Concern No    Weight Concern No    Special Diet No    Back Care No    Exercise No     Comment: gym but has been on hold with holidays    Bike Helmet No    Seat Belt Yes    Self-Exams No      Past Surgical History:   Procedure Laterality Date    Colonoscopy  05    complete    Colonoscopy      Colonoscopy,diagnostic  2016    Due 2026; Dwight Torrez M.D.    Hernia surgery  08    inguinal    Hernia surgery  10/10    Hip replacement surgery  93    Total    Knee arthroscp harv  11/15/10    Total; Left; Laterality    Knee replacement surgery      left TKA by Dr. Barlow    Leg/ankle surgery proc unlisted  60    Leg Osteotomy Tibial    Other surgical history      left tibial osteotomy as teen    Radiation  2022    Tonsillectomy      and adenoidectomy as child         REVIEW OF SYSTEMS:   GENERAL HEALTH: feels well otherwise  GENERAL : denies fever, chills, sweats, weight loss, weight gain  SKIN: denies any unusual skin lesions or rashes  RESPIRATORY: denies shortness of breath with exertion  NEURO: denies headaches    EXAM:   There were no vitals taken for this visit.    System Pertinent findings Details   Constitutional  Overall appearance - Normal.   Head/Face  Facial features -- Normal. Skull - Normal.   Eyes  Pupils equal ,round ,react to light and accomidate   Ears  External Ear Right: Normal, Left: Normal. Canal - Right: Normal, Left: Normal. TM - Right: Wax removed TM normal left: Wax removed TM  normal   Nose  External Nose, Normal, Septum -midline,Nasal Vault, clear. Turbinates - Right: Normal left: Normal   Mouth/Throat  Lips/teeth/gums - Normal. Tonsils -0+ oropharynx - Normal.   Neck Exam  Inspection - Normal. Palpation - Normal. Parotid gland - Normal. Thyroid gland -normal   Lymph Detail  Submental. Submandibular. Anterior cervical. Posterior cervical. Supraclavicular.   Patients exam showed wax impaction right ear, wax impaction left ear. Ear wax was removed under the operative microscope. No complications. Patient tolerated the procedure well. Ear exam findings listed in note after removal.      ASSESSMENT AND PLAN:   1. Bilateral impacted cerumen  3 to 4-month follow-up      The patient indicates understanding of these issues and agrees to the plan.      Arsen Faustin MD  7/2/2024  11:59 AM

## 2024-07-08 DIAGNOSIS — E11.65 UNCONTROLLED TYPE 2 DIABETES MELLITUS WITH HYPERGLYCEMIA (HCC): ICD-10-CM

## 2024-07-08 RX ORDER — METFORMIN HYDROCHLORIDE 750 MG/1
750 TABLET, EXTENDED RELEASE ORAL 2 TIMES DAILY WITH MEALS
Qty: 180 TABLET | Refills: 1 | OUTPATIENT
Start: 2024-07-08

## 2024-08-07 ENCOUNTER — OFFICE VISIT (OUTPATIENT)
Dept: PODIATRY CLINIC | Facility: CLINIC | Age: 74
End: 2024-08-07

## 2024-08-07 DIAGNOSIS — B35.1 ONYCHOMYCOSIS: Primary | ICD-10-CM

## 2024-08-07 DIAGNOSIS — M19.072 OSTEOARTHRITIS OF LEFT MIDFOOT: ICD-10-CM

## 2024-08-07 DIAGNOSIS — M19.072 ARTHRITIS OF LEFT ANKLE: ICD-10-CM

## 2024-08-07 DIAGNOSIS — E11.42 DIABETIC POLYNEUROPATHY ASSOCIATED WITH TYPE 2 DIABETES MELLITUS (HCC): ICD-10-CM

## 2024-08-07 DIAGNOSIS — M20.41 HAMMER TOES OF BOTH FEET: ICD-10-CM

## 2024-08-07 DIAGNOSIS — M20.42 HAMMER TOES OF BOTH FEET: ICD-10-CM

## 2024-08-07 PROCEDURE — 99213 OFFICE O/P EST LOW 20 MIN: CPT | Performed by: STUDENT IN AN ORGANIZED HEALTH CARE EDUCATION/TRAINING PROGRAM

## 2024-08-07 NOTE — PROGRESS NOTES
[facility]  Progress Note    Yifan Leonardo is a 74 year old male.   Chief Complaint   Patient presents with    Toenail Care     Pt here for nail trim and foot check.  No pain or concerns          HPI:     Patient is a pleasant 74-year-old male who presents to clinic for diabetic foot exam.  He has elongated and thickened nails he sometimes has difficulty trimming on his own.  He does have valgus deformity with some arthritic changes to his left foot and ankle.  He uses diabetic shoes and states to manage this which are working well for him. His last hemoglobin A1c was 7.1% on 3/15/24.  His blood sugars were 95 mg/dL when last checked.  No other complaints are mentioned.  Past medical history, medications, and allergies reviewed.      Allergies: Sulfa antibiotics and Zestril [lisinopril]   Current Outpatient Medications   Medication Sig Dispense Refill    PEG 3350-KCl-Na Bicarb-NaCl 420 g Oral Recon Soln Take as directed by physician. 4000 mL 0    Dulaglutide (TRULICITY) 3 MG/0.5ML Subcutaneous Solution Pen-injector Inject 3 mg as directed every 7 days. 2 mL 5    atorvastatin 10 MG Oral Tab Take 1 tablet (10 mg total) by mouth daily. 90 tablet 3    losartan 100 MG Oral Tab Take 1 tablet (100 mg total) by mouth daily. 90 tablet 1    hydroCHLOROthiazide 25 MG Oral Tab Take 1 tablet (25 mg total) by mouth every morning. 90 tablet 1    sertraline 50 MG Oral Tab Take 1 tablet (50 mg total) by mouth daily. 90 tablet 1    methylphenidate ER (CONCERTA) 27 MG Oral Tab CR Take 1 tablet (27 mg total) by mouth daily. 30 tablet 0    [START ON 8/20/2024] methylphenidate ER (CONCERTA) 27 MG Oral Tab CR Take 1 tablet (27 mg total) by mouth daily. 30 tablet 0    ketoconazole 2 % External Cream Apply 1 Application topically daily as needed. 60 g 1    amLODIPine 5 MG Oral Tab Take 1 tablet (5 mg total) by mouth daily. 90 tablet 1    tamsulosin 0.4 MG Oral Cap Take 1 capsule (0.4 mg total) by mouth every  evening. Take 1/2 hour following the same meal each day 90 capsule 6    metFORMIN HCl 1000 MG Oral Tab Take 1 tablet (1,000 mg total) by mouth 2 (two) times daily with meals. 180 tablet 1    FreeStyle Lancets Does not apply Misc TEST ONCE DAILY AS DIRECTED 100 each 3    Glucose Blood (FREESTYLE LITE TEST) In Vitro Strip TEST ONCE DAILY AS DIRECTED 100 strip 3    LEVOTHYROXINE 50 MCG Oral Tab TAKE 1 TABLET(50 MCG) BY MOUTH BEFORE BREAKFAST 90 tablet 2    Meloxicam 15 MG Oral Tab Take 1 tablet (15 mg total) by mouth daily. Take once daily for 4 weeks 30 tablet 0    lidocaine 5 % External Patch PLACE 1 PATCH ONTO THE SKIN DAILY. PUT ON FOR 12 HOURS THEN OFF FOR 12 HOURS (Patient taking differently: PLACE 1 PATCH ONTO THE SKIN DAILY. PUT ON FOR 12 HOURS THEN OFF FOR 12 HOURS as needed) 30 patch 5    Probiotic Product (PROBIOTIC PEARLS) Oral Cap Take 1 capsule by mouth daily.      Turmeric 500 MG Oral Cap Take 1 capsule by mouth daily.      Bioflavonoid Products (BIOFLEX) Oral Tab Take 1 tablet by mouth daily.      Cholecalciferol (VITAMIN D3) 1000 UNITS Oral Cap Take 1 tablet by mouth daily.      Cinnamon 500 MG Oral Cap Take 1 capsule by mouth 2 (two) times daily.        Omega-3 Fatty Acids (FISH OIL TRIPLE STRENGTH) 1400 MG Oral Cap Take 1 capsule by mouth daily.      Multiple Vitamins-Minerals (CENTRUM SILVER ULTRA MENS) Oral Tab Take 1 tablet by mouth daily.      ZYRTEC 10 MG OR TABS Take 1 mg by mouth daily.        Past Medical History:    Allergic rhinitis, cause unspecified    Chronic fatigue    Controlled type 2 diabetes mellitus without complication, without long-term current use of insulin (HCC)    Diabetes (HCC)    Diverticulosis    Dwight Torrez M.D.    Essential hypertension    Fall    Infective otitis externa, unspecified    Internal hemorrhoids    Dwight Torrez M.D.    Intestinal disaccharidase deficiencies and disaccharide malabsorption    Obesity    Overweight(278.02)    Pre-ulcerative corn or callous     Preoperative examination, unspecified    Prostate cancer (HCC)    Psychosexual dysfunction with inhibited sexual excitement    Sleep apnea    Uncontrolled type 2 diabetes mellitus with hyperglycemia (HCC)      Past Surgical History:   Procedure Laterality Date    Colonoscopy  05    complete    Colonoscopy      Colonoscopy,diagnostic  2016    Due 2026; Dwight Torrez M.D.    Hernia surgery  08    inguinal    Hernia surgery  10/10    Hip replacement surgery  93    Total    Knee arthroscp harv  11/15/10    Total; Left; Laterality    Knee replacement surgery      left TKA by Dr. Barlow    Leg/ankle surgery proc unlisted  60    Leg Osteotomy Tibial    Other surgical history      left tibial osteotomy as teen    Radiation  2022    Tonsillectomy      and adenoidectomy as child      Family History   Problem Relation Age of Onset    Colon Cancer Father     Hypertension Mother     Dementia Mother         alzheimers    Other (hypertension) Mother     Ovarian Cancer Sister     Hypertension Maternal Grandmother     Heart Disorder Maternal Grandmother         CAD    Heart Attack Maternal Grandmother     Heart Attack Maternal Grandfather     No Known Problems Son     No Known Problems Son     No Known Problems Son       Social History     Socioeconomic History    Marital status:    Tobacco Use    Smoking status: Former     Current packs/day: 0.00     Average packs/day: 1.5 packs/day for 5.0 years (7.5 ttl pk-yrs)     Types: Cigarettes     Start date: 1968     Quit date: 1973     Years since quittin.2    Smokeless tobacco: Never   Vaping Use    Vaping status: Never Used   Substance and Sexual Activity    Alcohol use: No     Alcohol/week: 0.0 standard drinks of alcohol    Drug use: No   Other Topics Concern     Service No    Blood Transfusions No    Caffeine Concern Yes     Comment: 0-1 teas daily    Occupational Exposure No    Hobby Hazards No    Sleep Concern  No    Stress Concern No    Weight Concern No    Special Diet No    Back Care No    Exercise No     Comment: gym but has been on hold with holidays    Bike Helmet No    Seat Belt Yes    Self-Exams No           REVIEW OF SYSTEMS:     Today reviewed systems as documented below  GENERAL HEALTH: feels well otherwise  SKIN: denies any unusual skin lesions or rashes  RESPIRATORY: denies shortness of breath with exertion  CARDIOVASCULAR: denies chest pain on exertion  GI: denies abdominal pain and denies heartburn  NEURO: denies headaches  MUSCULO: Acknowledges arthritis      EXAM:   There were no vitals taken for this visit.  GENERAL: well developed, well nourished, in no apparent distress  EXTREMITIES:   1. Integument: Normal skin temperature and turgor. Nails x 10 are elongated and thickened.  2. Vascular: Dorsalis pedis two out of four bilateral and posterior tibial pulses two out of   four bilateral, capillary refill normal.   3. Musculoskeletal: All muscle groups are graded 5 out of 5 in the foot and ankle.  Decreased medial arch height noted.  Mild pain on palpation noted to talonavicular joint of left foot.  Pain on palpation noted to ankle joint of the left foot.  Valgus deformity noted to left foot and ankle.  No strength deficits or pain to posterior tibial tendon or TA tendon.  Compartments are soft and compressible.   4. Neurological: Normal sharp dull sensation; reflexes normal.    Bilateral barefoot skin diabetic exam is abnormal with dystrophic nails and/or dry skin            ASSESSMENT AND PLAN:   Diagnoses and all orders for this visit:    Onychomycosis    Diabetic polyneuropathy associated with type 2 diabetes mellitus (HCC)    Osteoarthritis of left midfoot    Arthritis of left ankle    Hammer toes of both feet            Plan:     -Patient examined, chart history reviewed.  -Discussed etiology of patient's condition and various treatment options.  -Continue ambulating with DM shoes/inserts.  -Sharply  debrided nails x10 with nail nipper without incident.  Nails smoothed with Dremel.  -Discussed importance of proper shoe gear, pedal hygiene, tight glucose control.  -Can follow-up in 2 to 3 months for routine foot care or sooner if any other concerns arise.    All questions were answered to satisfaction.    The patient indicates understanding of these issues and agrees to the plan.      Sheldon Michael DPM

## 2024-08-12 ENCOUNTER — MED REC SCAN ONLY (OUTPATIENT)
Dept: FAMILY MEDICINE CLINIC | Facility: CLINIC | Age: 74
End: 2024-08-12

## 2024-09-03 ENCOUNTER — OFFICE VISIT (OUTPATIENT)
Facility: LOCATION | Age: 74
End: 2024-09-03
Payer: MEDICARE

## 2024-09-03 DIAGNOSIS — H61.23 BILATERAL IMPACTED CERUMEN: Primary | ICD-10-CM

## 2024-09-03 PROCEDURE — 99214 OFFICE O/P EST MOD 30 MIN: CPT | Performed by: OTOLARYNGOLOGY

## 2024-09-03 PROCEDURE — 92504 EAR MICROSCOPY EXAMINATION: CPT | Performed by: OTOLARYNGOLOGY

## 2024-09-03 NOTE — PROGRESS NOTES
Yifan Leonardo is a 74 year old male. No chief complaint on file.    HPI:   74-year-old white male longstanding patient wears hearing aids gets wax removed regular basis denies otorrhea otalgia vertigo  Current Outpatient Medications   Medication Sig Dispense Refill    PEG 3350-KCl-Na Bicarb-NaCl 420 g Oral Recon Soln Take as directed by physician. 4000 mL 0    Dulaglutide (TRULICITY) 3 MG/0.5ML Subcutaneous Solution Pen-injector Inject 3 mg as directed every 7 days. 2 mL 5    atorvastatin 10 MG Oral Tab Take 1 tablet (10 mg total) by mouth daily. 90 tablet 3    losartan 100 MG Oral Tab Take 1 tablet (100 mg total) by mouth daily. 90 tablet 1    hydroCHLOROthiazide 25 MG Oral Tab Take 1 tablet (25 mg total) by mouth every morning. 90 tablet 1    sertraline 50 MG Oral Tab Take 1 tablet (50 mg total) by mouth daily. 90 tablet 1    methylphenidate ER (CONCERTA) 27 MG Oral Tab CR Take 1 tablet (27 mg total) by mouth daily. 30 tablet 0    ketoconazole 2 % External Cream Apply 1 Application topically daily as needed. 60 g 1    amLODIPine 5 MG Oral Tab Take 1 tablet (5 mg total) by mouth daily. 90 tablet 1    tamsulosin 0.4 MG Oral Cap Take 1 capsule (0.4 mg total) by mouth every evening. Take 1/2 hour following the same meal each day 90 capsule 6    metFORMIN HCl 1000 MG Oral Tab Take 1 tablet (1,000 mg total) by mouth 2 (two) times daily with meals. 180 tablet 1    FreeStyle Lancets Does not apply Misc TEST ONCE DAILY AS DIRECTED 100 each 3    Glucose Blood (FREESTYLE LITE TEST) In Vitro Strip TEST ONCE DAILY AS DIRECTED 100 strip 3    LEVOTHYROXINE 50 MCG Oral Tab TAKE 1 TABLET(50 MCG) BY MOUTH BEFORE BREAKFAST 90 tablet 2    Meloxicam 15 MG Oral Tab Take 1 tablet (15 mg total) by mouth daily. Take once daily for 4 weeks 30 tablet 0    lidocaine 5 % External Patch PLACE 1 PATCH ONTO THE SKIN DAILY. PUT ON FOR 12 HOURS THEN OFF FOR 12 HOURS (Patient taking differently: PLACE 1 PATCH ONTO THE SKIN DAILY. PUT ON  FOR 12 HOURS THEN OFF FOR 12 HOURS as needed) 30 patch 5    Probiotic Product (PROBIOTIC PEARLS) Oral Cap Take 1 capsule by mouth daily.      Turmeric 500 MG Oral Cap Take 1 capsule by mouth daily.      Bioflavonoid Products (BIOFLEX) Oral Tab Take 1 tablet by mouth daily.      Cholecalciferol (VITAMIN D3) 1000 UNITS Oral Cap Take 1 tablet by mouth daily.      Cinnamon 500 MG Oral Cap Take 1 capsule by mouth 2 (two) times daily.        Omega-3 Fatty Acids (FISH OIL TRIPLE STRENGTH) 1400 MG Oral Cap Take 1 capsule by mouth daily.      Multiple Vitamins-Minerals (CENTRUM SILVER ULTRA MENS) Oral Tab Take 1 tablet by mouth daily.      ZYRTEC 10 MG OR TABS Take 1 mg by mouth daily.        Past Medical History:    Allergic rhinitis, cause unspecified    Chronic fatigue    Controlled type 2 diabetes mellitus without complication, without long-term current use of insulin (HCC)    Diabetes (HCC)    Diverticulosis    Dwight Torrez M.D.    Essential hypertension    Fall    Infective otitis externa, unspecified    Internal hemorrhoids    Dwight Torrez M.D.    Intestinal disaccharidase deficiencies and disaccharide malabsorption    Obesity    Overweight(278.02)    Pre-ulcerative corn or callous    Preoperative examination, unspecified    Prostate cancer (HCC)    Psychosexual dysfunction with inhibited sexual excitement    Sleep apnea    Uncontrolled type 2 diabetes mellitus with hyperglycemia (HCC)      Social History:  Social History     Socioeconomic History    Marital status:    Tobacco Use    Smoking status: Former     Current packs/day: 0.00     Average packs/day: 1.5 packs/day for 5.0 years (7.5 ttl pk-yrs)     Types: Cigarettes     Start date: 1968     Quit date: 1973     Years since quittin.3    Smokeless tobacco: Never   Vaping Use    Vaping status: Never Used   Substance and Sexual Activity    Alcohol use: No     Alcohol/week: 0.0 standard drinks of alcohol    Drug use: No   Other Topics Concern      Service No    Blood Transfusions No    Caffeine Concern Yes     Comment: 0-1 teas daily    Occupational Exposure No    Hobby Hazards No    Sleep Concern No    Stress Concern No    Weight Concern No    Special Diet No    Back Care No    Exercise No     Comment: gym but has been on hold with holidays    Bike Helmet No    Seat Belt Yes    Self-Exams No      Past Surgical History:   Procedure Laterality Date    Colonoscopy  01/01/05    complete    Colonoscopy      Colonoscopy,diagnostic  04/25/2016    Due 04/25/2026; Dwight Torrez M.D.    Hernia surgery  01/01/08    inguinal    Hernia surgery  10/10    Hip replacement surgery  01/01/93    Total    Knee arthroscp harv  11/15/10    Total; Left; Laterality    Knee replacement surgery  2011    left TKA by Dr. Barlow    Leg/ankle surgery proc unlisted  01/01/60    Leg Osteotomy Tibial    Other surgical history      left tibial osteotomy as teen    Radiation  01/2022    Tonsillectomy      and adenoidectomy as child         REVIEW OF SYSTEMS:   GENERAL HEALTH: feels well otherwise  GENERAL : denies fever, chills, sweats, weight loss, weight gain  SKIN: denies any unusual skin lesions or rashes  RESPIRATORY: denies shortness of breath with exertion  NEURO: denies headaches    EXAM:   There were no vitals taken for this visit.    System Pertinent findings Details   Constitutional  Overall appearance - Normal.   Head/Face  Facial features -- Normal. Skull - Normal.   Eyes  Pupils equal ,round ,react to light and accomidate   Ears  External Ear Right: Normal, Left: Normal. Canal - Right: Normal, Left: Normal. TM - Right: Wax removed TM normal left: Removed TM normal   Nose  External Nose, Normal, Septum -midline,Nasal Vault, clear. Turbinates - Right: Normal left: Normal   Mouth/Throat  Lips/teeth/gums - Normal. Tonsils -0+ oropharynx - Normal.   Neck Exam  Inspection - Normal. Palpation - Normal. Parotid gland - Normal. Thyroid gland -normal   Lymph Detail  Submental.  Submandibular. Anterior cervical. Posterior cervical. Supraclavicular.   Patients exam showed wax impaction right ear, wax impaction left ear. Ear wax was removed under the operative microscope. No complications. Patient tolerated the procedure well. Ear exam findings listed in note after removal.      ASSESSMENT AND PLAN:   1. Bilateral impacted cerumen  Follow-up 6 months      The patient indicates understanding of these issues and agrees to the plan.      Arsen Faustin MD  9/3/2024  11:15 AM

## 2024-09-05 ENCOUNTER — PATIENT MESSAGE (OUTPATIENT)
Dept: FAMILY MEDICINE CLINIC | Facility: CLINIC | Age: 74
End: 2024-09-05

## 2024-09-05 ENCOUNTER — HOSPITAL ENCOUNTER (OUTPATIENT)
Dept: CV DIAGNOSTICS | Age: 74
Discharge: HOME | End: 2024-09-05
Attending: FAMILY MEDICINE
Payer: MEDICARE

## 2024-09-05 DIAGNOSIS — E11.9 CONTROLLED TYPE 2 DIABETES MELLITUS WITHOUT COMPLICATION, WITHOUT LONG-TERM CURRENT USE OF INSULIN (HCC): ICD-10-CM

## 2024-09-05 DIAGNOSIS — I10 ESSENTIAL HYPERTENSION WITH GOAL BLOOD PRESSURE LESS THAN 130/80: ICD-10-CM

## 2024-09-05 DIAGNOSIS — E78.2 MIXED HYPERLIPIDEMIA: ICD-10-CM

## 2024-09-05 DIAGNOSIS — G47.33 OBSTRUCTIVE SLEEP APNEA: ICD-10-CM

## 2024-09-05 DIAGNOSIS — I25.10 CORONARY ARTERY DISEASE DUE TO LIPID RICH PLAQUE: ICD-10-CM

## 2024-09-05 DIAGNOSIS — F90.0 ATTENTION DEFICIT HYPERACTIVITY DISORDER (ADHD), PREDOMINANTLY INATTENTIVE TYPE: ICD-10-CM

## 2024-09-05 DIAGNOSIS — I25.83 CORONARY ARTERY DISEASE DUE TO LIPID RICH PLAQUE: ICD-10-CM

## 2024-09-05 PROCEDURE — 93306 TTE W/DOPPLER COMPLETE: CPT | Performed by: FAMILY MEDICINE

## 2024-09-05 PROCEDURE — 93350 STRESS TTE ONLY: CPT | Performed by: FAMILY MEDICINE

## 2024-09-05 PROCEDURE — 93017 CV STRESS TEST TRACING ONLY: CPT | Performed by: FAMILY MEDICINE

## 2024-09-05 PROCEDURE — 93018 CV STRESS TEST I&R ONLY: CPT | Performed by: FAMILY MEDICINE

## 2024-09-05 NOTE — TELEPHONE ENCOUNTER
From: Yifan Leonardo  To: Yandy Huston  Sent: 9/5/2024 9:52 AM CDT  Subject: Lab work    Do i have to have lab work done when i see you on oct 7 i did not see any orders on my chart    Thanks Yifan Leonardo

## 2024-09-06 NOTE — PROGRESS NOTES
2D echocardiogram normal ejection fraction.  Grade 2 diastolic dysfunction otherwise normal.  Grade 2 diastolic dysfunction can eventually increase the left ventricular filling pressure and can in the future lead to lung congestion and shortness of breath seen with early congestive heart failure.  Treatment is blood pressure control, cholesterol control, weight loss and maintaining healthy kidney function.  This can be achieved by a healthy diet with low sodium intake and physical activity on a daily basis.

## 2024-09-14 DIAGNOSIS — E11.65 UNCONTROLLED TYPE 2 DIABETES MELLITUS WITH HYPERGLYCEMIA (HCC): ICD-10-CM

## 2024-09-16 NOTE — TELEPHONE ENCOUNTER
Requested Prescriptions     Signed Prescriptions Disp Refills    METFORMIN HCL 1000 MG Oral Tab 180 tablet 1     Sig: TAKE 1 TABLET(1000 MG) BY MOUTH TWICE DAILY WITH MEALS     Authorizing Provider: STACY RANDHAWA     Ordering User: HYUN THOMAS      Refilled per protocol/OV notes

## 2024-09-18 DIAGNOSIS — F90.0 ATTENTION DEFICIT HYPERACTIVITY DISORDER (ADHD), PREDOMINANTLY INATTENTIVE TYPE: ICD-10-CM

## 2024-09-18 RX ORDER — METHYLPHENIDATE HYDROCHLORIDE 27 MG/1
27 TABLET ORAL DAILY
Qty: 30 TABLET | Refills: 0 | Status: SHIPPED | OUTPATIENT
Start: 2024-09-18 | End: 2024-10-18

## 2024-09-18 NOTE — TELEPHONE ENCOUNTER
Requested Prescriptions     Pending Prescriptions Disp Refills    methylphenidate ER (CONCERTA) 27 MG Oral Tab CR 30 tablet 0     Sig: Take 1 tablet (27 mg total) by mouth daily.        Last refill: 8/20/24 30 tabs 0 refills    Last Appointment: 7/1/24    Next Appointment: 10/7/24

## 2024-09-20 NOTE — PROGRESS NOTES
HPI:     Yifan Leonardo ([id number]) is a 74 year old male with a PMH of ADD, HTN, HL, DM, hypothyroid, allergies, diverticulosis, hemorroids.   Following for:  1. 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  - Oncotype GPS 22 - c/w favorable intermediate risk disease, likelihood adverse pathology at time of RP is 33% with 4% and 1 % chance of mets and CaP death within 10 y following RP.  2. BPH/LUTS  - flomax 1/27/22  3. OAB/UI  - no meds  - Kegels reviewed  4. ED  - pt prefers observation    PCP - Tarsha OCONNELL 4/1/2024    Presents for check-up.    He feels well today. He is taking flomax as stream weakens if he stops.    AUA SS is 3/35 with 1 n, w, f. Happy with LUTS.  Incontinence: UI/dribbles a couple times per week. No pads. Not bothersome enough to do Kegels  RADHA prior visit: ~ 40 g prostate, no nodules or tenderness    UA is negative    Has ED and prefers observation.    Prior PSAs:  - 0.11 9/26/24  - 0.22 4/1/24  - 0.18 9/29/23  - 0.26 4/3/23  - 0.41 10/3/22  - 1.21 3/30/22  - 4.23, 4K 29% 11/5/21  - 5.13 9/16/21  - 3.31 8/25/20  - 3.41 3/6/20  - 2.64 3/5/19  - 2-3 range years prior    Drinks ~ 50-60 oz water, 20-30 tea, 20 soda with medium yellow urine.    We have reviewed water/dietary irritants for OAB and he may cut back on tea/soda but urgency is not terribly bothersome at this time.     He will continue flomax for weak stream. Observation for ED and mild OAB. F/u in 6 mo with PSA prior or same day.    Prior note:    Gross hematuria: none  Tobacco hx: < 4 pack years, quit 1974  Kidney stone hx: none  Fam h/o  malignancy: none    HISTORY:  Past Medical History:    Allergic rhinitis, cause unspecified    Chronic fatigue    Controlled type 2 diabetes mellitus without complication, without long-term current use of insulin (HCC)    Diabetes (HCC)    Diverticulosis     Dwight Torrez M.D.    Essential hypertension    Fall    Infective otitis externa, unspecified    Internal hemorrhoids    Dwight Torrez M.D.    Intestinal disaccharidase deficiencies and disaccharide malabsorption    Obesity    Overweight(278.02)    Pre-ulcerative corn or callous    Preoperative examination, unspecified    Prostate cancer (HCC)    Psychosexual dysfunction with inhibited sexual excitement    Sleep apnea    Uncontrolled type 2 diabetes mellitus with hyperglycemia (HCC)      Past Surgical History:   Procedure Laterality Date    Colonoscopy  05    complete    Colonoscopy      Colonoscopy,diagnostic  2016    Due 2026; Dwight Torrez M.D.    Hernia surgery  08    inguinal    Hernia surgery  10/10    Hip replacement surgery  93    Total    Knee arthroscp harv  11/15/10    Total; Left; Laterality    Knee replacement surgery      left TKA by Dr. Barlow    Leg/ankle surgery proc unlisted  60    Leg Osteotomy Tibial    Other surgical history      left tibial osteotomy as teen    Radiation  2022    Tonsillectomy      and adenoidectomy as child      Family History   Problem Relation Age of Onset    Colon Cancer Father     Hypertension Mother     Dementia Mother         alzheimers    Other (hypertension) Mother     Ovarian Cancer Sister     Hypertension Maternal Grandmother     Heart Disorder Maternal Grandmother         CAD    Heart Attack Maternal Grandmother     Heart Attack Maternal Grandfather     No Known Problems Son     No Known Problems Son     No Known Problems Son       Social History:   Social History     Socioeconomic History    Marital status:    Tobacco Use    Smoking status: Former     Current packs/day: 0.00     Average packs/day: 1.5 packs/day for 5.0 years (7.5 ttl pk-yrs)     Types: Cigarettes     Start date: 1968     Quit date: 1973     Years since quittin.3    Smokeless tobacco: Never   Vaping Use    Vaping status: Never Used    Substance and Sexual Activity    Alcohol use: No     Alcohol/week: 0.0 standard drinks of alcohol    Drug use: No   Other Topics Concern     Service No    Blood Transfusions No    Caffeine Concern Yes     Comment: 0-1 teas daily    Occupational Exposure No    Hobby Hazards No    Sleep Concern No    Stress Concern No    Weight Concern No    Special Diet No    Back Care No    Exercise No     Comment: gym but has been on hold with holidays    Bike Helmet No    Seat Belt Yes    Self-Exams No        Medications (Active prior to today's visit):  Current Outpatient Medications   Medication Sig Dispense Refill    methylphenidate ER (CONCERTA) 27 MG Oral Tab CR Take 1 tablet (27 mg total) by mouth daily. 30 tablet 0    METFORMIN HCL 1000 MG Oral Tab TAKE 1 TABLET(1000 MG) BY MOUTH TWICE DAILY WITH MEALS 180 tablet 1    PEG 3350-KCl-Na Bicarb-NaCl 420 g Oral Recon Soln Take as directed by physician. 4000 mL 0    Dulaglutide (TRULICITY) 3 MG/0.5ML Subcutaneous Solution Pen-injector Inject 3 mg as directed every 7 days. 2 mL 5    atorvastatin 10 MG Oral Tab Take 1 tablet (10 mg total) by mouth daily. 90 tablet 3    losartan 100 MG Oral Tab Take 1 tablet (100 mg total) by mouth daily. 90 tablet 1    hydroCHLOROthiazide 25 MG Oral Tab Take 1 tablet (25 mg total) by mouth every morning. 90 tablet 1    sertraline 50 MG Oral Tab Take 1 tablet (50 mg total) by mouth daily. 90 tablet 1    ketoconazole 2 % External Cream Apply 1 Application topically daily as needed. 60 g 1    amLODIPine 5 MG Oral Tab Take 1 tablet (5 mg total) by mouth daily. 90 tablet 1    tamsulosin 0.4 MG Oral Cap Take 1 capsule (0.4 mg total) by mouth every evening. Take 1/2 hour following the same meal each day 90 capsule 6    FreeStyle Lancets Does not apply Misc TEST ONCE DAILY AS DIRECTED 100 each 3    Glucose Blood (FREESTYLE LITE TEST) In Vitro Strip TEST ONCE DAILY AS DIRECTED 100 strip 3    LEVOTHYROXINE 50 MCG Oral Tab TAKE 1 TABLET(50 MCG) BY  MOUTH BEFORE BREAKFAST 90 tablet 2    Meloxicam 15 MG Oral Tab Take 1 tablet (15 mg total) by mouth daily. Take once daily for 4 weeks 30 tablet 0    lidocaine 5 % External Patch PLACE 1 PATCH ONTO THE SKIN DAILY. PUT ON FOR 12 HOURS THEN OFF FOR 12 HOURS (Patient taking differently: PLACE 1 PATCH ONTO THE SKIN DAILY. PUT ON FOR 12 HOURS THEN OFF FOR 12 HOURS as needed) 30 patch 5    Probiotic Product (PROBIOTIC PEARLS) Oral Cap Take 1 capsule by mouth daily.      Turmeric 500 MG Oral Cap Take 1 capsule by mouth daily.      Bioflavonoid Products (BIOFLEX) Oral Tab Take 1 tablet by mouth daily.      Cholecalciferol (VITAMIN D3) 1000 UNITS Oral Cap Take 1 tablet by mouth daily.      Cinnamon 500 MG Oral Cap Take 1 capsule by mouth 2 (two) times daily.        Omega-3 Fatty Acids (FISH OIL TRIPLE STRENGTH) 1400 MG Oral Cap Take 1 capsule by mouth daily.      Multiple Vitamins-Minerals (CENTRUM SILVER ULTRA MENS) Oral Tab Take 1 tablet by mouth daily.      ZYRTEC 10 MG OR TABS Take 1 mg by mouth daily.         Allergies:  Allergies   Allergen Reactions    Sulfa Antibiotics UNKNOWN    Zestril [Lisinopril] OTHER (SEE COMMENTS)     TABS: Cough         ROS:     A comprehensive 10 point review of systems was completed.  Pertinent positives and negatives noted in the the HPI.    PHYSICAL EXAM:     GENERAL APPEARANCE: well, developed, well nourished, in no acute distress  NEUROLOGIC: nonfocal, alert and oriented  HEAD: normocephalic, atraumatic  EYES: sclera non-icteric  EARS: hearing intact  ORAL CAVITY: mucosa moist  NECK/THYROID: no obvious goiter or masses  LUNGS: nonlabored breathing  ABDOMEN: soft, no obvious masses or tenderness  SKIN: no obvious rashes    : as noted above     ASSESSMENT/PLAN:   Diagnoses and all orders for this visit:    Prostate cancer (HCC)    Weak urinary stream    Erectile dysfunction, unspecified erectile dysfunction type    Urinary urgency    - as noted above    Thanks again for this consult.       Ethan Elizabeth MD, FACS  Urologist  [facility]  Office: [phone]

## 2024-09-25 DIAGNOSIS — I10 ESSENTIAL HYPERTENSION WITH GOAL BLOOD PRESSURE LESS THAN 130/80: ICD-10-CM

## 2024-09-25 DIAGNOSIS — E06.3 HYPOTHYROIDISM DUE TO HASHIMOTO'S THYROIDITIS: ICD-10-CM

## 2024-09-26 ENCOUNTER — LAB ENCOUNTER (OUTPATIENT)
Dept: LAB | Age: 74
End: 2024-09-26
Attending: UROLOGY
Payer: MEDICARE

## 2024-09-26 DIAGNOSIS — C61 PROSTATE CANCER (HCC): ICD-10-CM

## 2024-09-26 LAB — PSA SERPL-MCNC: 0.11 NG/ML (ref ?–4)

## 2024-09-26 PROCEDURE — 36415 COLL VENOUS BLD VENIPUNCTURE: CPT

## 2024-09-26 PROCEDURE — 84153 ASSAY OF PSA TOTAL: CPT

## 2024-09-26 RX ORDER — AMLODIPINE BESYLATE 5 MG/1
5 TABLET ORAL DAILY
Qty: 90 TABLET | Refills: 1 | Status: SHIPPED | OUTPATIENT
Start: 2024-09-26

## 2024-09-26 RX ORDER — LEVOTHYROXINE SODIUM 50 UG/1
TABLET ORAL
Qty: 90 TABLET | Refills: 2 | Status: SHIPPED | OUTPATIENT
Start: 2024-09-26

## 2024-09-30 ENCOUNTER — OFFICE VISIT (OUTPATIENT)
Dept: SURGERY | Facility: CLINIC | Age: 74
End: 2024-09-30

## 2024-09-30 DIAGNOSIS — R39.12 WEAK URINARY STREAM: ICD-10-CM

## 2024-09-30 DIAGNOSIS — C61 PROSTATE CANCER (HCC): ICD-10-CM

## 2024-09-30 DIAGNOSIS — N52.9 ERECTILE DYSFUNCTION, UNSPECIFIED ERECTILE DYSFUNCTION TYPE: ICD-10-CM

## 2024-09-30 DIAGNOSIS — R82.90 URINE FINDING: Primary | ICD-10-CM

## 2024-09-30 DIAGNOSIS — R39.15 URINARY URGENCY: ICD-10-CM

## 2024-09-30 PROCEDURE — 99214 OFFICE O/P EST MOD 30 MIN: CPT | Performed by: UROLOGY

## 2024-09-30 PROCEDURE — 81003 URINALYSIS AUTO W/O SCOPE: CPT | Performed by: UROLOGY

## 2024-09-30 RX ORDER — TAMSULOSIN HYDROCHLORIDE 0.4 MG/1
0.4 CAPSULE ORAL EVERY EVENING
Qty: 90 CAPSULE | Refills: 6 | Status: SHIPPED | OUTPATIENT
Start: 2024-09-30

## 2024-10-01 ENCOUNTER — OFFICE VISIT (OUTPATIENT)
Dept: NEUROLOGY | Facility: CLINIC | Age: 74
End: 2024-10-01
Payer: MEDICARE

## 2024-10-01 VITALS
DIASTOLIC BLOOD PRESSURE: 58 MMHG | HEART RATE: 63 BPM | SYSTOLIC BLOOD PRESSURE: 116 MMHG | BODY MASS INDEX: 32 KG/M2 | RESPIRATION RATE: 16 BRPM | WEIGHT: 197 LBS

## 2024-10-01 DIAGNOSIS — M51.369 DEGENERATION OF INTERVERTEBRAL DISC OF LUMBAR REGION WITHOUT DISCOGENIC BACK PAIN OR LOWER EXTREMITY PAIN: Primary | ICD-10-CM

## 2024-10-01 PROCEDURE — 99214 OFFICE O/P EST MOD 30 MIN: CPT | Performed by: OTHER

## 2024-10-01 NOTE — PATIENT INSTRUCTIONS
Refill policies:    Allow 2-3 business days for refills; controlled substances may take longer.  Contact your pharmacy at least 5 days prior to running out of medication and have them send an electronic request or submit request through the “request refill” option in your [employer] account.  Refills are not addressed on weekends; covering physicians do not authorize routine medications on weekends.  No narcotics or controlled substances are refilled after noon on Fridays or by on call physicians.  By law, narcotics must be electronically prescribed.  A 30 day supply with no refills is the maximum allowed.  If your prescription is due for a refill, you may be due for a follow up appointment.  To best provide you care, patients receiving routine medications need to be seen at least once a year.  Patients receiving narcotic/controlled substance medications need to be seen at least once every 3 months.  In the event that your preferred pharmacy does not have the requested medication in stock (e.g. Backordered), it is your responsibility to find another pharmacy that has the requested medication available.  We will gladly send a new prescription to that pharmacy at your request.    Scheduling Tests:    If your physician has ordered radiology tests such as MRI or CT scans, please contact Central Scheduling at [phone] right away to schedule the test.  Once scheduled, the [facility] Centralized Referral Team will work with your insurance carrier to obtain pre-certification or prior authorization.  Depending on your insurance carrier, approval may take 3-10 days.  It is highly recommended patients assure they have received an authorization before having a test performed.  If test is done without insurance authorization, patient may be responsible for the entire amount billed.      Precertification and Prior Authorizations:  If your physician has recommended that you have a procedure or additional testing performed the [facility]  Centralized Referral Team will contact your insurance carrier to obtain pre-certification or prior authorization.    You are strongly encouraged to contact your insurance carrier to verify that your procedure/test has been approved and is a COVERED benefit.  Although the [facility] Centralized Referral Team does its due diligence, the insurance carrier gives the disclaimer that \"Although the procedure is authorized, this does not guarantee payment.\"    Ultimately the patient is responsible for payment.   Thank you for your understanding in this matter.  Paperwork Completion:  If you require FMLA or disability paperwork for your recovery, please make sure to either drop it off or have it faxed to our office at [phone]. Be sure the form has your name and date of birth on it.  The form will be faxed to our Forms Department and they will complete it for you.  There is a 25$ fee for all forms that need to be filled out.  Please be aware there is a 10-14 day turnaround time.  You will need to sign a release of information (MIRNA) form if your paperwork does not come with one.  You may call the Forms Department with any questions at [phone].  Their fax number is [phone].
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
## 2024-10-04 ENCOUNTER — TELEPHONE (OUTPATIENT)
Dept: FAMILY MEDICINE CLINIC | Facility: CLINIC | Age: 74
End: 2024-10-04

## 2024-10-04 DIAGNOSIS — E78.2 MIXED HYPERLIPIDEMIA: ICD-10-CM

## 2024-10-04 DIAGNOSIS — I10 ESSENTIAL HYPERTENSION WITH GOAL BLOOD PRESSURE LESS THAN 130/80: Primary | ICD-10-CM

## 2024-10-04 DIAGNOSIS — E11.9 CONTROLLED TYPE 2 DIABETES MELLITUS WITHOUT COMPLICATION, WITHOUT LONG-TERM CURRENT USE OF INSULIN (HCC): ICD-10-CM

## 2024-10-04 NOTE — TELEPHONE ENCOUNTER
Yifan Leonardo requesting  labs be ordered for 12/18/2024 prior to appointment.     Future Appointments   Date Time Provider Department Center                        12/18/2024  7:30 AM Yandy Huston PA-C [facility] [place]

## 2024-10-09 ENCOUNTER — OFFICE VISIT (OUTPATIENT)
Dept: PODIATRY CLINIC | Facility: CLINIC | Age: 74
End: 2024-10-09

## 2024-10-09 DIAGNOSIS — M20.42 HAMMER TOES OF BOTH FEET: ICD-10-CM

## 2024-10-09 DIAGNOSIS — B35.1 ONYCHOMYCOSIS: Primary | ICD-10-CM

## 2024-10-09 DIAGNOSIS — E11.42 DIABETIC POLYNEUROPATHY ASSOCIATED WITH TYPE 2 DIABETES MELLITUS (HCC): ICD-10-CM

## 2024-10-09 DIAGNOSIS — M19.072 ARTHRITIS OF LEFT ANKLE: ICD-10-CM

## 2024-10-09 DIAGNOSIS — M19.072 OSTEOARTHRITIS OF LEFT MIDFOOT: ICD-10-CM

## 2024-10-09 DIAGNOSIS — M20.41 HAMMER TOES OF BOTH FEET: ICD-10-CM

## 2024-10-09 DIAGNOSIS — M79.672 LEFT FOOT PAIN: ICD-10-CM

## 2024-10-09 PROCEDURE — 99213 OFFICE O/P EST LOW 20 MIN: CPT | Performed by: STUDENT IN AN ORGANIZED HEALTH CARE EDUCATION/TRAINING PROGRAM

## 2024-10-09 NOTE — PROGRESS NOTES
[facility] Podiatry  Progress Note    Yifan Leonardo is a 74 year old male.   Chief Complaint   Patient presents with    Diabetic Foot Care     Nail care and nichole check- FBS 89- A1C 6.2 on 6/17- LOV PCP Richie on 7/1         HPI:     Patient is a pleasant 74-year-old male who presents to clinic for diabetic foot exam.  He has elongated and thickened nails he sometimes has difficulty trimming on his own.  He does have valgus deformity with some arthritic changes to his left foot and ankle.  He uses diabetic shoes and states to manage this which are working well for him. His last hemoglobin A1c was 6.2% on 6/17/24.  His blood sugars were 89 mg/dL when last checked.  No other complaints are mentioned.  Past medical history, medications, and allergies reviewed.      Allergies: Sulfa antibiotics and Zestril [lisinopril]   Current Outpatient Medications   Medication Sig Dispense Refill    tamsulosin 0.4 MG Oral Cap Take 1 capsule (0.4 mg total) by mouth every evening. Take 1/2 hour following the same meal each day 90 capsule 6    LEVOTHYROXINE 50 MCG Oral Tab TAKE 1 TABLET(50 MCG) BY MOUTH BEFORE BREAKFAST 90 tablet 2    AMLODIPINE 5 MG Oral Tab TAKE 1 TABLET(5 MG) BY MOUTH DAILY 90 tablet 1    methylphenidate ER (CONCERTA) 27 MG Oral Tab CR Take 1 tablet (27 mg total) by mouth daily. 30 tablet 0    METFORMIN HCL 1000 MG Oral Tab TAKE 1 TABLET(1000 MG) BY MOUTH TWICE DAILY WITH MEALS 180 tablet 1    PEG 3350-KCl-Na Bicarb-NaCl 420 g Oral Recon Soln Take as directed by physician. 4000 mL 0    Dulaglutide (TRULICITY) 3 MG/0.5ML Subcutaneous Solution Pen-injector Inject 3 mg as directed every 7 days. 2 mL 5    atorvastatin 10 MG Oral Tab Take 1 tablet (10 mg total) by mouth daily. 90 tablet 3    losartan 100 MG Oral Tab Take 1 tablet (100 mg total) by mouth daily. 90 tablet 1    hydroCHLOROthiazide 25 MG Oral Tab Take 1 tablet (25 mg total) by mouth every morning. 90 tablet 1    sertraline 50 MG Oral Tab  Take 1 tablet (50 mg total) by mouth daily. 90 tablet 1    ketoconazole 2 % External Cream Apply 1 Application topically daily as needed. 60 g 1    FreeStyle Lancets Does not apply Misc TEST ONCE DAILY AS DIRECTED 100 each 3    Glucose Blood (FREESTYLE LITE TEST) In Vitro Strip TEST ONCE DAILY AS DIRECTED 100 strip 3    Meloxicam 15 MG Oral Tab Take 1 tablet (15 mg total) by mouth daily. Take once daily for 4 weeks 30 tablet 0    lidocaine 5 % External Patch PLACE 1 PATCH ONTO THE SKIN DAILY. PUT ON FOR 12 HOURS THEN OFF FOR 12 HOURS (Patient taking differently: PLACE 1 PATCH ONTO THE SKIN DAILY. PUT ON FOR 12 HOURS THEN OFF FOR 12 HOURS as needed) 30 patch 5    Probiotic Product (PROBIOTIC PEARLS) Oral Cap Take 1 capsule by mouth daily.      Turmeric 500 MG Oral Cap Take 1 capsule by mouth daily.      Bioflavonoid Products (BIOFLEX) Oral Tab Take 1 tablet by mouth daily.      Cholecalciferol (VITAMIN D3) 1000 UNITS Oral Cap Take 1 tablet by mouth daily.      Cinnamon 500 MG Oral Cap Take 1 capsule by mouth 2 (two) times daily.        Omega-3 Fatty Acids (FISH OIL TRIPLE STRENGTH) 1400 MG Oral Cap Take 1 capsule by mouth daily.      Multiple Vitamins-Minerals (CENTRUM SILVER ULTRA MENS) Oral Tab Take 1 tablet by mouth daily.      ZYRTEC 10 MG OR TABS Take 1 mg by mouth daily.        Past Medical History:    Allergic rhinitis, cause unspecified    Chronic fatigue    Controlled type 2 diabetes mellitus without complication, without long-term current use of insulin (HCC)    Diabetes (HCC)    Diverticulosis    Dwight Torrez M.D.    Essential hypertension    Fall    Infective otitis externa, unspecified    Internal hemorrhoids    Dwight Torrez M.D.    Intestinal disaccharidase deficiencies and disaccharide malabsorption    Obesity    Overweight(278.02)    Pre-ulcerative corn or callous    Preoperative examination, unspecified    Prostate cancer (HCC)    Psychosexual dysfunction with inhibited sexual excitement    Sleep  apnea    Uncontrolled type 2 diabetes mellitus with hyperglycemia (HCC)      Past Surgical History:   Procedure Laterality Date    Colonoscopy  05    complete    Colonoscopy      Colonoscopy,diagnostic  2016    Due 2026; Dwight Torrez M.D.    Hernia surgery  08    inguinal    Hernia surgery  10/10    Hip replacement surgery  93    Total    Knee arthroscp harv  11/15/10    Total; Left; Laterality    Knee replacement surgery      left TKA by Dr. Barlow    Leg/ankle surgery proc unlisted  60    Leg Osteotomy Tibial    Other surgical history      left tibial osteotomy as teen    Radiation  2022    Tonsillectomy      and adenoidectomy as child      Family History   Problem Relation Age of Onset    Colon Cancer Father     Hypertension Mother     Dementia Mother         alzheimers    Other (hypertension) Mother     Ovarian Cancer Sister     Hypertension Maternal Grandmother     Heart Disorder Maternal Grandmother         CAD    Heart Attack Maternal Grandmother     Heart Attack Maternal Grandfather     No Known Problems Son     No Known Problems Son     No Known Problems Son       Social History     Socioeconomic History    Marital status:    Tobacco Use    Smoking status: Former     Current packs/day: 0.00     Average packs/day: 1.5 packs/day for 5.0 years (7.5 ttl pk-yrs)     Types: Cigarettes     Start date: 1968     Quit date: 1973     Years since quittin.4    Smokeless tobacco: Never   Vaping Use    Vaping status: Never Used   Substance and Sexual Activity    Alcohol use: No     Alcohol/week: 0.0 standard drinks of alcohol    Drug use: No   Other Topics Concern     Service No    Blood Transfusions No    Caffeine Concern Yes     Comment: 0-1 teas daily    Occupational Exposure No    Hobby Hazards No    Sleep Concern No    Stress Concern No    Weight Concern No    Special Diet No    Back Care No    Exercise No     Comment: gym but has been on hold  with holidays    Bike Helmet No    Seat Belt Yes    Self-Exams No           REVIEW OF SYSTEMS:     Today reviewed systems as documented below  GENERAL HEALTH: feels well otherwise  SKIN: denies any unusual skin lesions or rashes  RESPIRATORY: denies shortness of breath with exertion  CARDIOVASCULAR: denies chest pain on exertion  GI: denies abdominal pain and denies heartburn  NEURO: denies headaches  MUSCULO: Acknowledges arthritis      EXAM:   There were no vitals taken for this visit.  GENERAL: well developed, well nourished, in no apparent distress  EXTREMITIES:   1. Integument: Normal skin temperature and turgor. Nails x 10 are elongated and thickened.  2. Vascular: Dorsalis pedis two out of four bilateral and posterior tibial pulses two out of   four bilateral, capillary refill normal.   3. Musculoskeletal: All muscle groups are graded 5 out of 5 in the foot and ankle.  Decreased medial arch height noted.  Mild pain on palpation noted to talonavicular joint of left foot.  Pain on palpation noted to ankle joint of the left foot.  Valgus deformity noted to left foot and ankle.  No strength deficits or pain to posterior tibial tendon or TA tendon.  Compartments are soft and compressible.   4. Neurological: Normal sharp dull sensation; reflexes normal.    Bilateral barefoot skin diabetic exam is abnormal with dystrophic nails and/or dry skin            ASSESSMENT AND PLAN:   Diagnoses and all orders for this visit:    Onychomycosis    Diabetic polyneuropathy associated with type 2 diabetes mellitus (HCC)    Osteoarthritis of left midfoot    Arthritis of left ankle    Hammer toes of both feet    Left foot pain              Plan:     -Patient examined, chart history reviewed.  -Discussed etiology of patient's condition and various treatment options.  -Continue ambulating with DM shoes/inserts.  -Sharply debrided nails x10 with nail nipper without incident.  Nails smoothed with Dremel.  -Discussed importance of  proper shoe gear, pedal hygiene, tight glucose control.  -Can follow-up in 2 to 3 months for routine foot care or sooner if any other concerns arise.    All questions were answered to satisfaction.    The patient indicates understanding of these issues and agrees to the plan.      Sheldon Michael DPM

## 2024-10-28 DIAGNOSIS — F41.1 GAD (GENERALIZED ANXIETY DISORDER): ICD-10-CM

## 2024-10-28 DIAGNOSIS — I10 ESSENTIAL HYPERTENSION WITH GOAL BLOOD PRESSURE LESS THAN 130/80: ICD-10-CM

## 2024-10-28 DIAGNOSIS — E78.2 MIXED HYPERLIPIDEMIA: ICD-10-CM

## 2024-10-28 RX ORDER — MELOXICAM 15 MG/1
15 TABLET ORAL DAILY
Qty: 30 TABLET | Refills: 0 | OUTPATIENT
Start: 2024-10-28

## 2024-10-28 RX ORDER — LOSARTAN POTASSIUM 100 MG/1
100 TABLET ORAL DAILY
Qty: 90 TABLET | Refills: 1 | Status: SHIPPED | OUTPATIENT
Start: 2024-10-28

## 2024-10-28 RX ORDER — ATORVASTATIN CALCIUM 10 MG/1
10 TABLET, FILM COATED ORAL DAILY
Qty: 90 TABLET | Refills: 3 | Status: SHIPPED | OUTPATIENT
Start: 2024-10-28

## 2024-10-28 NOTE — TELEPHONE ENCOUNTER
Meloxicam 15mg    DOS: n/a  Last OV: 10/9/23 tenosynovitis of radial styloid  Last refill date: 10/9/23 #/refills: 30/0  Upcoming appt: none     Refill not appropriate

## 2024-10-29 ENCOUNTER — OFFICE VISIT (OUTPATIENT)
Dept: FAMILY MEDICINE CLINIC | Facility: CLINIC | Age: 74
End: 2024-10-29
Payer: MEDICARE

## 2024-10-29 VITALS
RESPIRATION RATE: 18 BRPM | DIASTOLIC BLOOD PRESSURE: 58 MMHG | HEIGHT: 65 IN | SYSTOLIC BLOOD PRESSURE: 122 MMHG | HEART RATE: 81 BPM | TEMPERATURE: 98 F | OXYGEN SATURATION: 98 % | BODY MASS INDEX: 31.65 KG/M2 | WEIGHT: 190 LBS

## 2024-10-29 DIAGNOSIS — I25.10 CORONARY ARTERY DISEASE DUE TO CALCIFIED CORONARY LESION: ICD-10-CM

## 2024-10-29 DIAGNOSIS — I25.84 CORONARY ARTERY DISEASE DUE TO CALCIFIED CORONARY LESION: ICD-10-CM

## 2024-10-29 DIAGNOSIS — E78.2 MIXED HYPERLIPIDEMIA: ICD-10-CM

## 2024-10-29 DIAGNOSIS — I51.89 DIASTOLIC DYSFUNCTION: Primary | ICD-10-CM

## 2024-10-29 DIAGNOSIS — I10 ESSENTIAL HYPERTENSION WITH GOAL BLOOD PRESSURE LESS THAN 130/80: ICD-10-CM

## 2024-10-29 DIAGNOSIS — E11.9 CONTROLLED TYPE 2 DIABETES MELLITUS WITHOUT COMPLICATION, WITHOUT LONG-TERM CURRENT USE OF INSULIN (HCC): ICD-10-CM

## 2024-10-29 DIAGNOSIS — F90.0 ATTENTION DEFICIT HYPERACTIVITY DISORDER (ADHD), PREDOMINANTLY INATTENTIVE TYPE: ICD-10-CM

## 2024-10-29 PROCEDURE — 90662 IIV NO PRSV INCREASED AG IM: CPT | Performed by: FAMILY MEDICINE

## 2024-10-29 PROCEDURE — 99215 OFFICE O/P EST HI 40 MIN: CPT | Performed by: FAMILY MEDICINE

## 2024-10-29 PROCEDURE — G0008 ADMIN INFLUENZA VIRUS VAC: HCPCS | Performed by: FAMILY MEDICINE

## 2024-10-29 RX ORDER — METHYLPHENIDATE HYDROCHLORIDE 27 MG/1
27 TABLET ORAL DAILY
Qty: 30 TABLET | Refills: 0 | Status: SHIPPED | OUTPATIENT
Start: 2024-10-29 | End: 2024-11-28

## 2024-10-29 RX ORDER — METHYLPHENIDATE HYDROCHLORIDE 27 MG/1
27 TABLET ORAL DAILY
Qty: 30 TABLET | Refills: 0 | Status: SHIPPED | OUTPATIENT
Start: 2024-12-30 | End: 2025-01-29

## 2024-10-29 RX ORDER — METHYLPHENIDATE HYDROCHLORIDE 27 MG/1
27 TABLET ORAL DAILY
Qty: 30 TABLET | Refills: 0 | Status: SHIPPED | OUTPATIENT
Start: 2024-11-29 | End: 2024-12-29

## 2024-10-29 RX ORDER — HYDROCHLOROTHIAZIDE 25 MG/1
25 TABLET ORAL EVERY MORNING
Qty: 90 TABLET | Refills: 1 | Status: SHIPPED | OUTPATIENT
Start: 2024-10-29

## 2024-10-29 NOTE — PROGRESS NOTES
Yifan Leonardo is a 74 year old male.  HPI:   Patient is in for follow-up on diabetes and ADD.    Neurologist seen for tremors no follow-up needed   Dr Elizabeth urologist PSA 0.11  Podiatrist recently feet are fine  Colonoscopy was scheduled delayed needed heart tests first completed the echo and stress test wants to review the echo       Stage II diastolic dysfunction  Denies any chest pain, shortness of breath or swelling, has never seen a cardiologist is interested    9/6/2023 heart scan  with a total score of 138  Stress echocardiogram 9/5/2024 normal stress echocardiogram with ejection fraction of 60%    Echocardiogram 9/5/2024 normal ejection fraction 60 to 65% with grade 2 diastolic dysfunction   Left ventricle: The cavity size was normal. Wall thickness was normal.      Systolic function was normal. The estimated ejection fraction was 60-65%.      No diagnostic evidence for regional wall motion abnormalities. Features      are consistent with a pseudonormal left ventricular filling pattern, with      concomitant abnormal relaxation and increased filling pressure - grade 2      diastolic dysfunction.   2. Left atrium: The left atrial volume was normal.   3. Right ventricle: The cavity size was normal. Systolic function was      normal.   Impressions:  No previous study was available for comparison.     Controlled type 2 diabetes mellitus with hyperglycemia ([facility])    6/17/2024 hemoglobin A1c 6.2 improved from 3/15/2024 hemoglobin A1c 7.1  3/15/2024 urine microalbumin creatinine ratio normal, LDL 52 and GFR 87  Diabetic medications atorvastatin 10 mg no muscle aches, Trulicity 3 mg weekly, losartan 100 mg daily, metformin 1000 mg twice a day no diarrhea  Trulicity is working well has helped with weight loss it is costly at $250 but the GLP-1 were not covered and were more expensive.  Notices a great improvement with his appetite.  No GERD or constipation.  Fasting blood sugars between   patient states this morning it was 87  He has been diligently recording blood sugars and weight over the last 3 months and has it in a notebook much improved with weight loss  Has lost 10 pounds since 6/2024 last visit  Stops eating past 6 PM  CPAP using it nightly now for 6 hours  Dilated eye exam completed 5/28/2024 normal   Podiatrist UTD every 51 days trimming and feet are good Dr Block      Exercise walking 3,000-7,000 steps per day;  elliptical was 3 times per week will restart;  Balance is better no falls    No dyspnea, swelling or cramping  Denies any hypoglycemia thirst or increased urination  No family history or personal history of medullary thyroid cancer no FH MEN,  no personal history or family history of pancreatitis.    No present GERD symptoms or bowel movement issues.     Feet no numbness no tingling sees podiatrist every 2 months      Diet mostly home-cooked food limiting carbohydrates mostly fruit does not like vegetables but overall is eating healthy occasionally eats out     Attention deficit hyperactivity disorder (ADHD), predominantly inattentive type/NE/depression in remission  He denies any side effects including no chest pain, shortness breath, dizziness, swelling hands and feet, tics,  sleep disturbance or appetite suppression. Mood has been good with no signs of anxiety or depression.  Concerta 27 mg works well for tasks starting, staying and finishing his attention span is better with Concerta versus Focalin and energy improved.  He has stage II diastolic dysfunction with normal ejection fraction and no symptoms of congestive heart failure  Will discuss ADD medication with cardiologist  Mood is doing good with no anxiety or depression does take sertraline 50 mg daily well-tolerated no side effects request refill on sertraline.  Wants to stay on the sertraline to help with his mood stability especially the anxiety which is much better  No depression symptoms states that he has good  interest and pleasure in doing things  6-7 hours sleep with CPAP wakes up once nightly for urination and able to fall asleep again  No counseling for 2.5 years    Does notice a very slight tremor in left second finger tremor has not changed discussed with neurologist no concerns has not noticed it much recently just very mild .              Current Outpatient Medications   Medication Sig Dispense Refill    methylphenidate ER (CONCERTA) 27 MG Oral Tab CR Take 1 tablet (27 mg total) by mouth daily. 30 tablet 0    [START ON 11/29/2024] methylphenidate ER (CONCERTA) 27 MG Oral Tab CR Take 1 tablet (27 mg total) by mouth daily. 30 tablet 0    [START ON 12/30/2024] methylphenidate ER (CONCERTA) 27 MG Oral Tab CR Take 1 tablet (27 mg total) by mouth daily. 30 tablet 0    hydroCHLOROthiazide 25 MG Oral Tab Take 1 tablet (25 mg total) by mouth every morning. 90 tablet 1    SERTRALINE 50 MG Oral Tab TAKE 1 TABLET(50 MG) BY MOUTH DAILY 90 tablet 1    LOSARTAN 100 MG Oral Tab TAKE 1 TABLET(100 MG) BY MOUTH DAILY 90 tablet 1    ATORVASTATIN 10 MG Oral Tab TAKE 1 TABLET(10 MG) BY MOUTH DAILY 90 tablet 3    tamsulosin 0.4 MG Oral Cap Take 1 capsule (0.4 mg total) by mouth every evening. Take 1/2 hour following the same meal each day 90 capsule 6    LEVOTHYROXINE 50 MCG Oral Tab TAKE 1 TABLET(50 MCG) BY MOUTH BEFORE BREAKFAST 90 tablet 2    AMLODIPINE 5 MG Oral Tab TAKE 1 TABLET(5 MG) BY MOUTH DAILY 90 tablet 1    METFORMIN HCL 1000 MG Oral Tab TAKE 1 TABLET(1000 MG) BY MOUTH TWICE DAILY WITH MEALS 180 tablet 1    Dulaglutide (TRULICITY) 3 MG/0.5ML Subcutaneous Solution Pen-injector Inject 3 mg as directed every 7 days. 2 mL 5    ketoconazole 2 % External Cream Apply 1 Application topically daily as needed. 60 g 1    FreeStyle Lancets Does not apply Misc TEST ONCE DAILY AS DIRECTED 100 each 3    Glucose Blood (FREESTYLE LITE TEST) In Vitro Strip TEST ONCE DAILY AS DIRECTED 100 strip 3    lidocaine 5 % External Patch PLACE 1 PATCH  ONTO THE SKIN DAILY. PUT ON FOR 12 HOURS THEN OFF FOR 12 HOURS 30 patch 5    Probiotic Product (PROBIOTIC PEARLS) Oral Cap Take 1 capsule by mouth daily.      Turmeric 500 MG Oral Cap Take 1 capsule by mouth daily.      Bioflavonoid Products (BIOFLEX) Oral Tab Take 1 tablet by mouth daily.      Cholecalciferol (VITAMIN D3) 1000 UNITS Oral Cap Take 1 tablet by mouth daily.      Cinnamon 500 MG Oral Cap Take 1 capsule by mouth 2 (two) times daily.        Omega-3 Fatty Acids (FISH OIL TRIPLE STRENGTH) 1400 MG Oral Cap Take 1 capsule by mouth daily.      Multiple Vitamins-Minerals (CENTRUM SILVER ULTRA MENS) Oral Tab Take 1 tablet by mouth daily.      ZYRTEC 10 MG OR TABS Take 1 mg by mouth daily.      PEG 3350-KCl-Na Bicarb-NaCl 420 g Oral Recon Soln Take as directed by physician. (Patient not taking: Reported on 10/29/2024) 4000 mL 0      Past Medical History:    Allergic rhinitis, cause unspecified    Chronic fatigue    Controlled type 2 diabetes mellitus without complication, without long-term current use of insulin (HCC)    Diabetes (HCC)    Diverticulosis    Dwight Torrez M.D.    Essential hypertension    Fall    Infective otitis externa, unspecified    Internal hemorrhoids    Dwight Torrez M.D.    Intestinal disaccharidase deficiencies and disaccharide malabsorption    Obesity    Overweight(278.02)    Pre-ulcerative corn or callous    Preoperative examination, unspecified    Prostate cancer (HCC)    Psychosexual dysfunction with inhibited sexual excitement    Sleep apnea    Uncontrolled type 2 diabetes mellitus with hyperglycemia (HCC)      Social History:  Social History     Socioeconomic History    Marital status:    Tobacco Use    Smoking status: Former     Current packs/day: 0.00     Average packs/day: 1.5 packs/day for 5.0 years (7.5 ttl pk-yrs)     Types: Cigarettes     Start date: 1968     Quit date: 1973     Years since quittin.4    Smokeless tobacco: Never   Vaping Use    Vaping  status: Never Used   Substance and Sexual Activity    Alcohol use: No     Alcohol/week: 0.0 standard drinks of alcohol    Drug use: No   Other Topics Concern     Service No    Blood Transfusions No    Caffeine Concern Yes     Comment: 0-1 teas daily    Occupational Exposure No    Hobby Hazards No    Sleep Concern No    Stress Concern No    Weight Concern No    Special Diet No    Back Care No    Exercise No     Comment: gym but has been on hold with holidays    Bike Helmet No    Seat Belt Yes    Self-Exams No        REVIEW OF SYSTEMS:   GENERAL HEALTH: feels well otherwise  SKIN: denies any unusual skin lesions or rashes  RESPIRATORY: denies shortness of breath with exertion  CARDIOVASCULAR: denies chest pain on exertion  GI: denies abdominal pain and denies heartburn  NEURO: denies headaches  Musculoskeletal: No motor deficits  EXAM:   /58 (BP Location: Right arm, Patient Position: Sitting, Cuff Size: adult)   Pulse 81   Temp 97.7 °F (36.5 °C)   Resp 18   Ht 5' 5\" (1.651 m)   Wt 190 lb (86.2 kg)   SpO2 98%   BMI 31.62 kg/m²   GENERAL: well developed, well nourished,in no apparent distress  SKIN: no rashes,no suspicious lesions  EYES: PERRLA, EOM intact, sclera clear without injection  NECK: supple,no adenopathy, thyroid normal to palpation  LUNGS: clear to auscultation no rales, rhonchi or wheezes  CARDIO: RRR without murmur  GI: Normal bowel sounds ×4 quadrant, no hepatosplenomegaly or masses, and no tenderness   EXTREMITIES: no cyanosis, clubbing or edema  Musculoskeletal: No gross deficit  Neurological: nerves II through XII grossly intact no sensorimotor deficit  Psychological: Mood and affect are normal.  Good communication skills.  ASSESSMENT AND PLAN:     Encounter Diagnoses   Name Primary?    Controlled type 2 diabetes mellitus without complication, without long-term current use of insulin (HCC)     Attention deficit hyperactivity disorder (ADHD), predominantly inattentive type     Mixed  hyperlipidemia     Essential hypertension with goal blood pressure less than 130/80     Coronary artery disease due to calcified coronary lesion     Diastolic dysfunction Yes       Orders Placed This Encounter   Procedures    INFLUENZA VAC HIGH DOSE PRSV FREE       Meds & Refills for this Visit:  Requested Prescriptions     Signed Prescriptions Disp Refills    methylphenidate ER (CONCERTA) 27 MG Oral Tab CR 30 tablet 0     Sig: Take 1 tablet (27 mg total) by mouth daily.    methylphenidate ER (CONCERTA) 27 MG Oral Tab CR 30 tablet 0     Sig: Take 1 tablet (27 mg total) by mouth daily.    methylphenidate ER (CONCERTA) 27 MG Oral Tab CR 30 tablet 0     Sig: Take 1 tablet (27 mg total) by mouth daily.    hydroCHLOROthiazide 25 MG Oral Tab 90 tablet 1     Sig: Take 1 tablet (25 mg total) by mouth every morning.       Imaging & Consults:  INFLUENZA VAC HIGH DOSE PRSV FREE  CARDIO - EXTERNAL  1. Controlled type 2 diabetes mellitus without complication, without long-term current use of insulin (HCC)  Continue with checking feet daily and podiatrist visits  Eye exams yearly due 5/2025 continue with  Trulicity 3 mg every 7 days continue with medication well-tolerated no side effects has had a decreased appetite and is losing weight blood sugars much improved  Continue with fasting blood sugar monitoring  Watch for any signs of hypoglycemia  Continue with atorvastatin 10 mg  - Cardio Referral - External  Hemoglobin A1c, lipid, CMP due on around 12/18/2024 orders in epic and patient understands labs are due  2. Attention deficit hyperactivity disorder (ADHD), predominantly inattentive type  Refill on ADD medication provided patient tolerates well no side effects.  Patient is aware of risks including pulmonary hypertension.  Reviewed side effects including chest pain, shortness breath, dizziness, swelling hands and feet, tics, tremors, sleep disturbance, appetite suppression or mood changes such as anxiety or depression.     -  methylphenidate ER (CONCERTA) 27 MG Oral Tab CR; Take 1 tablet (27 mg total) by mouth daily.  Dispense: 30 tablet; Refill: 0  - methylphenidate ER (CONCERTA) 27 MG Oral Tab CR; Take 1 tablet (27 mg total) by mouth daily.  Dispense: 30 tablet; Refill: 0  - methylphenidate ER (CONCERTA) 27 MG Oral Tab CR; Take 1 tablet (27 mg total) by mouth daily.  Dispense: 30 tablet; Refill: 0    3. Mixed hyperlipidemia  Coronary artery disease due to calcified coronary lesion  Continue with atorvastatin 10 mg well-tolerated no side effects  - Cardio Referral - External    4. Essential hypertension with goal blood pressure less than 130/80  Diastolic dysfunction  Continue with amlodipine 5 mg, losartan 100 mg and hydrochlorothiazide 25 mg medications will tolerated no side effects  CMP and lipids are being done around 12/18/2024  - hydroCHLOROthiazide 25 MG Oral Tab; Take 1 tablet (25 mg total) by mouth every morning.  Dispense: 90 tablet; Refill: 1  - Cardio Referral - External  Continue with CPAP use  Reviewed pathophysiology of diastolic dysfunction  Ejection fraction normal.  Grade 2 diastolic dysfunction, abnormal left ventricular relaxation.  This can eventually increase the left ventricular filling pressure and can in the future lead to lung congestion and shortness of breath seen with early congestive heart failure.  Treatment is blood pressure control, cholesterol control, weight loss and maintaining healthy kidney function.  This can be achieved by a healthy diet with low sodium intake and physical activity on a daily basis.    Time spent was 40 minutes more than 50% was spent on counseling regarding medical conditions and treatment.  Rest of time was spent reviewing chart, reviewing blood work and radiology tests.   Provider patient relationship has had a longitudinal long term relationship to address and treat complex conditions.    The patient indicates understanding of these issues and agrees to the plan.  The patient  is asked to return in 3 months.

## 2024-12-04 ENCOUNTER — OFFICE VISIT (OUTPATIENT)
Facility: LOCATION | Age: 74
End: 2024-12-04
Payer: MEDICARE

## 2024-12-04 DIAGNOSIS — E11.9 CONTROLLED TYPE 2 DIABETES MELLITUS WITHOUT COMPLICATION, WITHOUT LONG-TERM CURRENT USE OF INSULIN (HCC): ICD-10-CM

## 2024-12-04 DIAGNOSIS — H61.23 BILATERAL IMPACTED CERUMEN: Primary | ICD-10-CM

## 2024-12-04 PROCEDURE — 99214 OFFICE O/P EST MOD 30 MIN: CPT | Performed by: OTOLARYNGOLOGY

## 2024-12-04 PROCEDURE — 92504 EAR MICROSCOPY EXAMINATION: CPT | Performed by: OTOLARYNGOLOGY

## 2024-12-04 RX ORDER — DULAGLUTIDE 3 MG/.5ML
INJECTION, SOLUTION SUBCUTANEOUS
Qty: 2 ML | Refills: 0 | Status: SHIPPED | OUTPATIENT
Start: 2024-12-04

## 2024-12-04 NOTE — PROGRESS NOTES
Yifan Leonardo is a 74 year old male. No chief complaint on file.    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  Current Outpatient Medications   Medication Sig Dispense Refill    TRULICITY 3 MG/0.5ML Subcutaneous Solution Auto-injector inject 3mg as directed every 7 days 2 mL 0    methylphenidate ER (CONCERTA) 27 MG Oral Tab CR Take 1 tablet (27 mg total) by mouth daily. 30 tablet 0    [START ON 12/30/2024] methylphenidate ER (CONCERTA) 27 MG Oral Tab CR Take 1 tablet (27 mg total) by mouth daily. 30 tablet 0    hydroCHLOROthiazide 25 MG Oral Tab Take 1 tablet (25 mg total) by mouth every morning. 90 tablet 1    SERTRALINE 50 MG Oral Tab TAKE 1 TABLET(50 MG) BY MOUTH DAILY 90 tablet 1    LOSARTAN 100 MG Oral Tab TAKE 1 TABLET(100 MG) BY MOUTH DAILY 90 tablet 1    ATORVASTATIN 10 MG Oral Tab TAKE 1 TABLET(10 MG) BY MOUTH DAILY 90 tablet 3    tamsulosin 0.4 MG Oral Cap Take 1 capsule (0.4 mg total) by mouth every evening. Take 1/2 hour following the same meal each day 90 capsule 6    LEVOTHYROXINE 50 MCG Oral Tab TAKE 1 TABLET(50 MCG) BY MOUTH BEFORE BREAKFAST 90 tablet 2    AMLODIPINE 5 MG Oral Tab TAKE 1 TABLET(5 MG) BY MOUTH DAILY 90 tablet 1    METFORMIN HCL 1000 MG Oral Tab TAKE 1 TABLET(1000 MG) BY MOUTH TWICE DAILY WITH MEALS 180 tablet 1    PEG 3350-KCl-Na Bicarb-NaCl 420 g Oral Recon Soln Take as directed by physician. (Patient not taking: Reported on 10/29/2024) 4000 mL 0    ketoconazole 2 % External Cream Apply 1 Application topically daily as needed. 60 g 1    FreeStyle Lancets Does not apply Misc TEST ONCE DAILY AS DIRECTED 100 each 3    Glucose Blood (FREESTYLE LITE TEST) In Vitro Strip TEST ONCE DAILY AS DIRECTED 100 strip 3    lidocaine 5 % External Patch PLACE 1 PATCH ONTO THE SKIN DAILY. PUT ON FOR 12 HOURS THEN OFF FOR 12 HOURS 30 patch 5    Probiotic Product (PROBIOTIC PEARLS) Oral Cap Take 1 capsule by mouth  daily.      Turmeric 500 MG Oral Cap Take 1 capsule by mouth daily.      Bioflavonoid Products (BIOFLEX) Oral Tab Take 1 tablet by mouth daily.      Cholecalciferol (VITAMIN D3) 1000 UNITS Oral Cap Take 1 tablet by mouth daily.      Cinnamon 500 MG Oral Cap Take 1 capsule by mouth 2 (two) times daily.        Omega-3 Fatty Acids (FISH OIL TRIPLE STRENGTH) 1400 MG Oral Cap Take 1 capsule by mouth daily.      Multiple Vitamins-Minerals (CENTRUM SILVER ULTRA MENS) Oral Tab Take 1 tablet by mouth daily.      ZYRTEC 10 MG OR TABS Take 1 mg by mouth daily.        Past Medical History:    Allergic rhinitis, cause unspecified    Chronic fatigue    Controlled type 2 diabetes mellitus without complication, without long-term current use of insulin (HCC)    Diabetes (HCC)    Diverticulosis    Dwight Torrez M.D.    Essential hypertension    Fall    Infective otitis externa, unspecified    Internal hemorrhoids    Dwight Torrez M.D.    Intestinal disaccharidase deficiencies and disaccharide malabsorption    Obesity    Overweight(278.02)    Pre-ulcerative corn or callous    Preoperative examination, unspecified    Prostate cancer (HCC)    Psychosexual dysfunction with inhibited sexual excitement    Sleep apnea    Uncontrolled type 2 diabetes mellitus with hyperglycemia (HCC)      Social History:  Social History     Socioeconomic History    Marital status:    Tobacco Use    Smoking status: Former     Current packs/day: 0.00     Average packs/day: 1.5 packs/day for 5.0 years (7.5 ttl pk-yrs)     Types: Cigarettes     Start date: 1968     Quit date: 1973     Years since quittin.5    Smokeless tobacco: Never   Vaping Use    Vaping status: Never Used   Substance and Sexual Activity    Alcohol use: No     Alcohol/week: 0.0 standard drinks of alcohol    Drug use: No   Other Topics Concern     Service No    Blood Transfusions No    Caffeine Concern Yes     Comment: 0-1 teas daily    Occupational Exposure No     Hobby Hazards No    Sleep Concern No    Stress Concern No    Weight Concern No    Special Diet No    Back Care No    Exercise No     Comment: gym but has been on hold with holidays    Bike Helmet No    Seat Belt Yes    Self-Exams No      Past Surgical History:   Procedure Laterality Date    Colonoscopy  01/01/05    complete    Colonoscopy      Colonoscopy,diagnostic  04/25/2016    Due 04/25/2026; Dwight Torrez M.D.    Hernia surgery  01/01/08    inguinal    Hernia surgery  10/10    Hip replacement surgery  01/01/93    Total    Knee arthroscp harv  11/15/10    Total; Left; Laterality    Knee replacement surgery  2011    left TKA by Dr. Barlow    Leg/ankle surgery proc unlisted  01/01/60    Leg Osteotomy Tibial    Other surgical history      left tibial osteotomy as teen    Radiation  01/2022    Tonsillectomy      and adenoidectomy as child         REVIEW OF SYSTEMS:   GENERAL HEALTH: feels well otherwise  GENERAL : denies fever, chills, sweats, weight loss, weight gain  SKIN: denies any unusual skin lesions or rashes  RESPIRATORY: denies shortness of breath with exertion  NEURO: denies headaches    EXAM:   There were no vitals taken for this visit.    System Pertinent findings Details   Constitutional  Overall appearance - Normal.   Head/Face  Facial features -- Normal. Skull - Normal.   Eyes  Pupils equal ,round ,react to light and accomidate   Ears  External Ear Right: Normal, Left: Normal. Canal - Right: Normal, Left: Normal. TM - Right: Wax removed TM normal left: Wax removed TM normal   Nose  External Nose, Normal, Septum -midline,Nasal Vault, clear. Turbinates - Right: Normal left: Normal   Mouth/Throat  Lips/teeth/gums - Normal. Tonsils -0+ oropharynx - Normal.   Neck Exam  Inspection - Normal. Palpation - Normal. Parotid gland - Normal. Thyroid gland -normal   Lymph Detail  Submental. Submandibular. Anterior cervical. Posterior cervical. Supraclavicular.   Patients exam showed wax impaction right ear, wax  impaction left ear. Ear wax was removed under the operative microscope. No complications. Patient tolerated the procedure well. Ear exam findings listed in note after removal.      ASSESSMENT AND PLAN:   1. Bilateral impacted cerumen  As needed follow-up      The patient indicates understanding of these issues and agrees to the plan.      Arsen Faustin MD  12/4/2024  3:19 PM

## 2024-12-13 ENCOUNTER — OFFICE VISIT (OUTPATIENT)
Dept: PODIATRY CLINIC | Facility: CLINIC | Age: 74
End: 2024-12-13

## 2024-12-13 DIAGNOSIS — M20.42 HAMMER TOES OF BOTH FEET: ICD-10-CM

## 2024-12-13 DIAGNOSIS — B35.1 ONYCHOMYCOSIS: ICD-10-CM

## 2024-12-13 DIAGNOSIS — M19.072 OSTEOARTHRITIS OF LEFT MIDFOOT: ICD-10-CM

## 2024-12-13 DIAGNOSIS — M20.41 HAMMER TOES OF BOTH FEET: ICD-10-CM

## 2024-12-13 DIAGNOSIS — M19.072 ARTHRITIS OF LEFT ANKLE: ICD-10-CM

## 2024-12-13 DIAGNOSIS — E11.42 DIABETIC POLYNEUROPATHY ASSOCIATED WITH TYPE 2 DIABETES MELLITUS (HCC): Primary | ICD-10-CM

## 2024-12-13 DIAGNOSIS — M79.672 LEFT FOOT PAIN: ICD-10-CM

## 2024-12-13 PROCEDURE — 99213 OFFICE O/P EST LOW 20 MIN: CPT | Performed by: STUDENT IN AN ORGANIZED HEALTH CARE EDUCATION/TRAINING PROGRAM

## 2024-12-13 NOTE — PROGRESS NOTES
[facility]  Progress Note    Yifan Leonardo is a 74 year old male.   Chief Complaint   Patient presents with    Diabetic Foot Care     Nail care and foot check- FBS 92- A1C 6.2 on 6/17- LOV on 10/29 M. Robel JALLOH         HPI:     Patient is a pleasant 74-year-old male who presents to clinic for diabetic foot exam.  He has elongated and thickened nails he sometimes has difficulty trimming on his own.  He does have valgus deformity with some arthritic changes to his left foot and ankle.  He uses diabetic shoes and states to manage this which are working well for him. His last hemoglobin A1c was 6.2% on 6/17/24.  He is finding good improvement with brace to left ankle.  Past medical history, medications, and allergies reviewed.      Allergies: Sulfa antibiotics and Zestril [lisinopril]   Current Outpatient Medications   Medication Sig Dispense Refill    TRULICITY 3 MG/0.5ML Subcutaneous Solution Auto-injector inject 3mg as directed every 7 days 2 mL 0    methylphenidate ER (CONCERTA) 27 MG Oral Tab CR Take 1 tablet (27 mg total) by mouth daily. 30 tablet 0    [START ON 12/30/2024] methylphenidate ER (CONCERTA) 27 MG Oral Tab CR Take 1 tablet (27 mg total) by mouth daily. 30 tablet 0    hydroCHLOROthiazide 25 MG Oral Tab Take 1 tablet (25 mg total) by mouth every morning. 90 tablet 1    SERTRALINE 50 MG Oral Tab TAKE 1 TABLET(50 MG) BY MOUTH DAILY 90 tablet 1    LOSARTAN 100 MG Oral Tab TAKE 1 TABLET(100 MG) BY MOUTH DAILY 90 tablet 1    ATORVASTATIN 10 MG Oral Tab TAKE 1 TABLET(10 MG) BY MOUTH DAILY 90 tablet 3    tamsulosin 0.4 MG Oral Cap Take 1 capsule (0.4 mg total) by mouth every evening. Take 1/2 hour following the same meal each day 90 capsule 6    LEVOTHYROXINE 50 MCG Oral Tab TAKE 1 TABLET(50 MCG) BY MOUTH BEFORE BREAKFAST 90 tablet 2    AMLODIPINE 5 MG Oral Tab TAKE 1 TABLET(5 MG) BY MOUTH DAILY 90 tablet 1    METFORMIN HCL 1000 MG Oral Tab TAKE 1 TABLET(1000 MG) BY MOUTH TWICE DAILY  WITH MEALS 180 tablet 1    PEG 3350-KCl-Na Bicarb-NaCl 420 g Oral Recon Soln Take as directed by physician. 4000 mL 0    ketoconazole 2 % External Cream Apply 1 Application topically daily as needed. 60 g 1    FreeStyle Lancets Does not apply Misc TEST ONCE DAILY AS DIRECTED 100 each 3    Glucose Blood (FREESTYLE LITE TEST) In Vitro Strip TEST ONCE DAILY AS DIRECTED 100 strip 3    lidocaine 5 % External Patch PLACE 1 PATCH ONTO THE SKIN DAILY. PUT ON FOR 12 HOURS THEN OFF FOR 12 HOURS 30 patch 5    Probiotic Product (PROBIOTIC PEARLS) Oral Cap Take 1 capsule by mouth daily.      Turmeric 500 MG Oral Cap Take 1 capsule by mouth daily.      Bioflavonoid Products (BIOFLEX) Oral Tab Take 1 tablet by mouth daily.      Cholecalciferol (VITAMIN D3) 1000 UNITS Oral Cap Take 1 tablet by mouth daily.      Cinnamon 500 MG Oral Cap Take 1 capsule by mouth 2 (two) times daily.        Omega-3 Fatty Acids (FISH OIL TRIPLE STRENGTH) 1400 MG Oral Cap Take 1 capsule by mouth daily.      Multiple Vitamins-Minerals (CENTRUM SILVER ULTRA MENS) Oral Tab Take 1 tablet by mouth daily.      ZYRTEC 10 MG OR TABS Take 1 mg by mouth daily.        Past Medical History:    Allergic rhinitis, cause unspecified    Chronic fatigue    Controlled type 2 diabetes mellitus without complication, without long-term current use of insulin (HCC)    Diabetes (HCC)    Diverticulosis    Dwight Torrez M.D.    Essential hypertension    Fall    Infective otitis externa, unspecified    Internal hemorrhoids    Dwight Torrez M.D.    Intestinal disaccharidase deficiencies and disaccharide malabsorption    Obesity    Overweight(278.02)    Pre-ulcerative corn or callous    Preoperative examination, unspecified    Prostate cancer (HCC)    Psychosexual dysfunction with inhibited sexual excitement    Sleep apnea    Uncontrolled type 2 diabetes mellitus with hyperglycemia (HCC)      Past Surgical History:   Procedure Laterality Date    Colonoscopy  01/01/05    complete     Colonoscopy      Colonoscopy,diagnostic  2016    Due 2026; Dwight Torrez M.D.    Hernia surgery  08    inguinal    Hernia surgery  10/10    Hip replacement surgery  89    Total    Knee arthroscp harv  11/15/10    Total; Left; Laterality    Knee replacement surgery      left TKA by Dr. Barlow    Leg/ankle surgery proc unlisted  61    Leg Osteotomy Tibial    Other surgical history      left tibial osteotomy as teen    Radiation  2022    Tonsillectomy      and adenoidectomy as child      Family History   Problem Relation Age of Onset    Colon Cancer Father     Hypertension Mother     Dementia Mother         alzheimers    Other (hypertension) Mother     Ovarian Cancer Sister     Hypertension Maternal Grandmother     Heart Disorder Maternal Grandmother         CAD    Heart Attack Maternal Grandmother     Heart Attack Maternal Grandfather     No Known Problems Son     No Known Problems Son     No Known Problems Son       Social History     Socioeconomic History    Marital status:    Tobacco Use    Smoking status: Former     Current packs/day: 0.00     Average packs/day: 1.5 packs/day for 5.0 years (7.5 ttl pk-yrs)     Types: Cigarettes     Start date: 1968     Quit date: 1973     Years since quittin.6    Smokeless tobacco: Never   Vaping Use    Vaping status: Never Used   Substance and Sexual Activity    Alcohol use: No     Alcohol/week: 0.0 standard drinks of alcohol    Drug use: No   Other Topics Concern     Service No    Blood Transfusions No    Caffeine Concern Yes     Comment: 0-1 teas daily    Occupational Exposure No    Hobby Hazards No    Sleep Concern No    Stress Concern No    Weight Concern No    Special Diet No    Back Care No    Exercise No     Comment: gym but has been on hold with holidays    Bike Helmet No    Seat Belt Yes    Self-Exams No           REVIEW OF SYSTEMS:     Today reviewed systems as documented below  GENERAL HEALTH: feels well  otherwise  SKIN: denies any unusual skin lesions or rashes  RESPIRATORY: denies shortness of breath with exertion  CARDIOVASCULAR: denies chest pain on exertion  GI: denies abdominal pain and denies heartburn  NEURO: denies headaches  MUSCULO: Acknowledges arthritis      EXAM:   There were no vitals taken for this visit.  GENERAL: well developed, well nourished, in no apparent distress  EXTREMITIES:   1. Integument: Normal skin temperature and turgor. Nails x 10 are elongated and thickened.  2. Vascular: Dorsalis pedis two out of four bilateral and posterior tibial pulses two out of   four bilateral, capillary refill normal.   3. Musculoskeletal: All muscle groups are graded 5 out of 5 in the foot and ankle.  Decreased medial arch height noted.  Mild pain on palpation noted to talonavicular joint of left foot.  Pain on palpation noted to ankle joint of the left foot.  Valgus deformity noted to left foot and ankle.  No strength deficits or pain to posterior tibial tendon or TA tendon.  Compartments are soft and compressible.   4. Neurological: Normal sharp dull sensation; reflexes normal.    Bilateral barefoot skin diabetic exam is abnormal with dystrophic nails and/or dry skin            ASSESSMENT AND PLAN:   Diagnoses and all orders for this visit:    Diabetic polyneuropathy associated with type 2 diabetes mellitus (HCC)    Onychomycosis    Osteoarthritis of left midfoot    Arthritis of left ankle    Hammer toes of both feet    Left foot pain                Plan:     -Patient examined, chart history reviewed.  -Discussed etiology of patient's condition and various treatment options.  -Continue ambulating with DM shoes/inserts. Continue brace to left ankle.  -Sharply debrided nails x10 with nail nipper without incident.  Nails smoothed with Dremel.  -Discussed importance of proper shoe gear, pedal hygiene, tight glucose control.  -Can follow-up in 2 to 3 months for routine foot care or sooner if any other concerns  arise.    All questions were answered to satisfaction.    The patient indicates understanding of these issues and agrees to the plan.      Sheldon Michael DPM

## 2025-01-07 DIAGNOSIS — E11.9 CONTROLLED TYPE 2 DIABETES MELLITUS WITHOUT COMPLICATION, WITHOUT LONG-TERM CURRENT USE OF INSULIN (HCC): ICD-10-CM

## 2025-01-07 RX ORDER — DULAGLUTIDE 3 MG/.5ML
INJECTION, SOLUTION SUBCUTANEOUS
Qty: 2 ML | Refills: 0 | Status: SHIPPED | OUTPATIENT
Start: 2025-01-07

## 2025-01-07 NOTE — TELEPHONE ENCOUNTER
Requested Prescriptions     Pending Prescriptions Disp Refills    TRULICITY 3 MG/0.5ML Subcutaneous Solution Auto-injector [Pharmacy Med Name: Trulicity Subcutaneous Solution Auto-injector 3 MG/0.5ML] 2 mL 0     Sig: inject 3mg as directed every 7 days       Last Refill: 12/4/24    Last OV: 10/29/24    Next OV: 1/27/25

## 2025-01-22 ENCOUNTER — LAB ENCOUNTER (OUTPATIENT)
Dept: LAB | Age: 75
End: 2025-01-22
Attending: FAMILY MEDICINE
Payer: MEDICARE

## 2025-01-22 DIAGNOSIS — E78.2 MIXED HYPERLIPIDEMIA: ICD-10-CM

## 2025-01-22 DIAGNOSIS — E11.9 CONTROLLED TYPE 2 DIABETES MELLITUS WITHOUT COMPLICATION, WITHOUT LONG-TERM CURRENT USE OF INSULIN (HCC): ICD-10-CM

## 2025-01-22 DIAGNOSIS — I10 ESSENTIAL HYPERTENSION WITH GOAL BLOOD PRESSURE LESS THAN 130/80: ICD-10-CM

## 2025-01-22 LAB
ALBUMIN SERPL-MCNC: 4.4 G/DL (ref 3.2–4.8)
ALBUMIN/GLOB SERPL: 1.4 {RATIO} (ref 1–2)
ALP LIVER SERPL-CCNC: 97 U/L
ALT SERPL-CCNC: 15 U/L
ANION GAP SERPL CALC-SCNC: 7 MMOL/L (ref 0–18)
AST SERPL-CCNC: 19 U/L (ref ?–34)
BILIRUB SERPL-MCNC: 0.3 MG/DL (ref 0.2–1.1)
BUN BLD-MCNC: 10 MG/DL (ref 9–23)
CALCIUM BLD-MCNC: 9.8 MG/DL (ref 8.7–10.6)
CHLORIDE SERPL-SCNC: 102 MMOL/L (ref 98–112)
CHOLEST SERPL-MCNC: 135 MG/DL (ref ?–200)
CO2 SERPL-SCNC: 29 MMOL/L (ref 21–32)
CREAT BLD-MCNC: 0.86 MG/DL
EGFRCR SERPLBLD CKD-EPI 2021: 91 ML/MIN/1.73M2 (ref 60–?)
EST. AVERAGE GLUCOSE BLD GHB EST-MCNC: 131 MG/DL (ref 68–126)
FASTING PATIENT LIPID ANSWER: YES
FASTING STATUS PATIENT QL REPORTED: YES
GLOBULIN PLAS-MCNC: 3.2 G/DL (ref 2–3.5)
GLUCOSE BLD-MCNC: 94 MG/DL (ref 70–99)
HBA1C MFR BLD: 6.2 % (ref ?–5.7)
HDLC SERPL-MCNC: 44 MG/DL (ref 40–59)
LDLC SERPL CALC-MCNC: 64 MG/DL (ref ?–100)
NONHDLC SERPL-MCNC: 91 MG/DL (ref ?–130)
OSMOLALITY SERPL CALC.SUM OF ELEC: 285 MOSM/KG (ref 275–295)
POTASSIUM SERPL-SCNC: 4.1 MMOL/L (ref 3.5–5.1)
PROT SERPL-MCNC: 7.6 G/DL (ref 5.7–8.2)
SODIUM SERPL-SCNC: 138 MMOL/L (ref 136–145)
TRIGL SERPL-MCNC: 158 MG/DL (ref 30–149)
VLDLC SERPL CALC-MCNC: 24 MG/DL (ref 0–30)

## 2025-01-22 PROCEDURE — 36415 COLL VENOUS BLD VENIPUNCTURE: CPT

## 2025-01-22 PROCEDURE — 80061 LIPID PANEL: CPT

## 2025-01-22 PROCEDURE — 80053 COMPREHEN METABOLIC PANEL: CPT

## 2025-01-22 PROCEDURE — 83036 HEMOGLOBIN GLYCOSYLATED A1C: CPT

## 2025-01-23 NOTE — PROGRESS NOTES
Normal kidney and liver function testing.  Stable blood sugar testing.  Normal lipid testing   Sincerely,   Yandy Huston PA-C

## 2025-01-27 ENCOUNTER — OFFICE VISIT (OUTPATIENT)
Dept: FAMILY MEDICINE CLINIC | Facility: CLINIC | Age: 75
End: 2025-01-27
Payer: MEDICARE

## 2025-01-27 VITALS
TEMPERATURE: 97 F | BODY MASS INDEX: 31.99 KG/M2 | DIASTOLIC BLOOD PRESSURE: 64 MMHG | OXYGEN SATURATION: 97 % | SYSTOLIC BLOOD PRESSURE: 124 MMHG | HEIGHT: 65 IN | HEART RATE: 70 BPM | WEIGHT: 192 LBS | RESPIRATION RATE: 16 BRPM

## 2025-01-27 DIAGNOSIS — Z13.0 SCREENING FOR DEFICIENCY ANEMIA: ICD-10-CM

## 2025-01-27 DIAGNOSIS — E78.2 MIXED HYPERLIPIDEMIA: ICD-10-CM

## 2025-01-27 DIAGNOSIS — F90.0 ATTENTION DEFICIT HYPERACTIVITY DISORDER (ADHD), PREDOMINANTLY INATTENTIVE TYPE: ICD-10-CM

## 2025-01-27 DIAGNOSIS — I25.10 CORONARY ARTERY DISEASE DUE TO LIPID RICH PLAQUE: ICD-10-CM

## 2025-01-27 DIAGNOSIS — G47.33 OSA ON CPAP: ICD-10-CM

## 2025-01-27 DIAGNOSIS — I25.83 CORONARY ARTERY DISEASE DUE TO LIPID RICH PLAQUE: ICD-10-CM

## 2025-01-27 DIAGNOSIS — E11.9 CONTROLLED TYPE 2 DIABETES MELLITUS WITHOUT COMPLICATION, WITHOUT LONG-TERM CURRENT USE OF INSULIN (HCC): Primary | ICD-10-CM

## 2025-01-27 DIAGNOSIS — E06.3 HYPOTHYROIDISM DUE TO HASHIMOTO'S THYROIDITIS: ICD-10-CM

## 2025-01-27 DIAGNOSIS — I10 ESSENTIAL HYPERTENSION WITH GOAL BLOOD PRESSURE LESS THAN 130/80: ICD-10-CM

## 2025-01-27 PROCEDURE — 99215 OFFICE O/P EST HI 40 MIN: CPT | Performed by: FAMILY MEDICINE

## 2025-01-27 PROCEDURE — G2211 COMPLEX E/M VISIT ADD ON: HCPCS | Performed by: FAMILY MEDICINE

## 2025-01-27 RX ORDER — METHYLPHENIDATE HYDROCHLORIDE 27 MG/1
27 TABLET ORAL DAILY
Qty: 30 TABLET | Refills: 0 | Status: SHIPPED | OUTPATIENT
Start: 2025-02-01 | End: 2025-03-03

## 2025-01-27 RX ORDER — METHYLPHENIDATE HYDROCHLORIDE 27 MG/1
27 TABLET ORAL DAILY
Qty: 30 TABLET | Refills: 0 | Status: SHIPPED | OUTPATIENT
Start: 2025-03-03 | End: 2025-04-02

## 2025-01-27 RX ORDER — DULAGLUTIDE 3 MG/.5ML
3 INJECTION, SOLUTION SUBCUTANEOUS
Qty: 2 ML | Refills: 5 | Status: SHIPPED | OUTPATIENT
Start: 2025-01-27

## 2025-01-27 RX ORDER — METHYLPHENIDATE HYDROCHLORIDE 27 MG/1
27 TABLET ORAL DAILY
Qty: 30 TABLET | Refills: 0 | Status: SHIPPED | OUTPATIENT
Start: 2025-04-02 | End: 2025-05-02

## 2025-01-27 NOTE — PROGRESS NOTES
Yifan Leonardo is a 74 year old male.  HPI:   Patient is here to follow-up on diabetes, ADD and recent blood work.    Has follow-up visits with podiatrist 2/14/2025, sleep specialist for sleep apnea 3/17/2025 and urologist for prostate cancer in remission 3/31/2025    Controlled type 2 diabetes mellitus with hyperglycemia (HCC)  1/22/25 hemoglobin A1C 6.2 stable from 6/17/2024 hemoglobin A1c 6.2 improved from 3/15/2024 hemoglobin A1c 7.1  3/15/2024 urine microalbumin creatinine ratio normal, LDL 52 and GFR 87    BS fasting <100 not checking the 2 hours after     Exercise for the past 2 months has not been exercising    Cardiologist Dr Bass end of February for stage II diastolic dysfunction denies any chest pain, shortness of breath or dizziness    Diabetic medications atorvastatin 10 mg no muscle aches, Trulicity 3 mg weekly, losartan 100 mg daily, metformin 1000 mg twice a day no diarrhea  Trulicity is working well has helped with weight loss it is costly at $250; Ozempic and Mounjaro not covered and were more expensive.  Notices a great improvement with his appetite.  No GERD or constipation.  CPAP using it nightly now for 6 hours  Dilated eye exam completed 5/28/2024 normal   Podiatrist UTD every 51 days trimming and feet are good Dr Michael   Exercise walking 3,000-7,000 steps per day;  elliptical was 3 times per week will restart;  Balance is better no falls    No dyspnea, swelling or cramping  Denies hypoglycemia, increased thirst or increased urination  No family history or personal history of medullary thyroid cancer no FH MEN,  no personal history or family history of pancreatitis.    No present GERD symptoms or bowel movement issues.   Feet no numbness no tingling sees podiatrist every 2 months      Diet mostly home-cooked food not as good as he usually is over the last 3 months overall though is trying to limit carbohydrates occasionally eats out  Stops eating past 6 PM      Attention deficit  hyperactivity disorder (ADHD), predominantly inattentive type/NE/depression in remission  He denies any side effects including no chest pain, shortness breath, dizziness, swelling hands and feet, tics,  sleep disturbance or appetite suppression. Mood has been good with no signs of anxiety or depression.  Concerta 27 mg works well for tasks starting, staying and finishing his attention span is better with Concerta versus Focalin and energy improved.  He has stage II diastolic dysfunction with normal ejection fraction and no symptoms of congestive heart failure  Will discuss ADD medication with cardiologist  Mood is doing good with no anxiety or depression does take sertraline 50 mg daily well-tolerated no side effects.    Wants to stay on the sertraline to help with his mood stability especially the anxiety which is much better  No depression symptoms states that he has good interest and pleasure in doing things  6-7 hours sleep with CPAP wakes up once nightly for urination and able to fall asleep again  No counseling for 3 years since he has been good on the combination of medication  Does notice a very slight tremor in left second finger tremor has not changed discussed with neurologist no concerns has not noticed it much recently just very mild .     Stage II diastolic dysfunction  Denies any chest pain, shortness of breath or swelling, has never seen a cardiologist is interested     9/6/2023 heart scan  with a total score of 138  Stress echocardiogram 9/5/2024 normal stress echocardiogram with ejection fraction of 60%     Echocardiogram 9/5/2024 normal ejection fraction 60 to 65% with grade 2 diastolic dysfunction   Left ventricle: The cavity size was normal. Wall thickness was normal.      Systolic function was normal. The estimated ejection fraction was 60-65%.      No diagnostic evidence for regional wall motion abnormalities. Features      are consistent with a pseudonormal left ventricular filling  pattern, with      concomitant abnormal relaxation and increased filling pressure - grade 2      diastolic dysfunction.   2. Left atrium: The left atrial volume was normal.   3. Right ventricle: The cavity size was normal. Systolic function was      normal.   Impressions:  No previous study was available for comparison.       Results for orders placed or performed in visit on 01/22/25   Hemoglobin A1C [E]    Collection Time: 01/22/25  8:06 AM   Result Value Ref Range    HgbA1C 6.2 (H) <5.7 %    Estimated Average Glucose 131 (H) 68 - 126 mg/dL   Lipid Panel [E]    Collection Time: 01/22/25  8:06 AM   Result Value Ref Range    Cholesterol, Total 135 <200 mg/dL    HDL Cholesterol 44 40 - 59 mg/dL    Triglycerides 158 (H) 30 - 149 mg/dL    LDL Cholesterol 64 <100 mg/dL    VLDL 24 0 - 30 mg/dL    Non HDL Chol 91 <130 mg/dL    Patient Fasting for Lipid? Yes    Comp Metabolic Panel (14) [E]    Collection Time: 01/22/25  8:06 AM   Result Value Ref Range    Glucose 94 70 - 99 mg/dL    Sodium 138 136 - 145 mmol/L    Potassium 4.1 3.5 - 5.1 mmol/L    Chloride 102 98 - 112 mmol/L    CO2 29.0 21.0 - 32.0 mmol/L    Anion Gap 7 0 - 18 mmol/L    BUN 10 9 - 23 mg/dL    Creatinine 0.86 0.70 - 1.30 mg/dL    Calcium, Total 9.8 8.7 - 10.6 mg/dL    Calculated Osmolality 285 275 - 295 mOsm/kg    eGFR-Cr 91 >=60 mL/min/1.73m2    AST 19 <34 U/L    ALT 15 10 - 49 U/L    Alkaline Phosphatase 97 45 - 117 U/L    Bilirubin, Total 0.3 0.2 - 1.1 mg/dL    Total Protein 7.6 5.7 - 8.2 g/dL    Albumin 4.4 3.2 - 4.8 g/dL    Globulin  3.2 2.0 - 3.5 g/dL    A/G Ratio 1.4 1.0 - 2.0    Patient Fasting for CMP? Yes          Current Outpatient Medications   Medication Sig Dispense Refill    Dulaglutide (TRULICITY) 3 MG/0.5ML Subcutaneous Solution Auto-injector Inject 3 mg as directed every 7 days. inject 3mg as directed every 7 days 2 mL 5    [START ON 2/1/2025] methylphenidate ER (CONCERTA) 27 MG Oral Tab CR Take 1 tablet (27 mg total) by mouth daily. 30  tablet 0    [START ON 3/3/2025] methylphenidate ER (CONCERTA) 27 MG Oral Tab CR Take 1 tablet (27 mg total) by mouth daily. 30 tablet 0    [START ON 4/2/2025] methylphenidate ER (CONCERTA) 27 MG Oral Tab CR Take 1 tablet (27 mg total) by mouth daily. 30 tablet 0    hydroCHLOROthiazide 25 MG Oral Tab Take 1 tablet (25 mg total) by mouth every morning. 90 tablet 1    SERTRALINE 50 MG Oral Tab TAKE 1 TABLET(50 MG) BY MOUTH DAILY 90 tablet 1    LOSARTAN 100 MG Oral Tab TAKE 1 TABLET(100 MG) BY MOUTH DAILY 90 tablet 1    ATORVASTATIN 10 MG Oral Tab TAKE 1 TABLET(10 MG) BY MOUTH DAILY 90 tablet 3    tamsulosin 0.4 MG Oral Cap Take 1 capsule (0.4 mg total) by mouth every evening. Take 1/2 hour following the same meal each day 90 capsule 6    LEVOTHYROXINE 50 MCG Oral Tab TAKE 1 TABLET(50 MCG) BY MOUTH BEFORE BREAKFAST 90 tablet 2    AMLODIPINE 5 MG Oral Tab TAKE 1 TABLET(5 MG) BY MOUTH DAILY 90 tablet 1    METFORMIN HCL 1000 MG Oral Tab TAKE 1 TABLET(1000 MG) BY MOUTH TWICE DAILY WITH MEALS 180 tablet 1    ketoconazole 2 % External Cream Apply 1 Application topically daily as needed. 60 g 1    FreeStyle Lancets Does not apply Misc TEST ONCE DAILY AS DIRECTED 100 each 3    Glucose Blood (FREESTYLE LITE TEST) In Vitro Strip TEST ONCE DAILY AS DIRECTED 100 strip 3    lidocaine 5 % External Patch PLACE 1 PATCH ONTO THE SKIN DAILY. PUT ON FOR 12 HOURS THEN OFF FOR 12 HOURS 30 patch 5    Probiotic Product (PROBIOTIC PEARLS) Oral Cap Take 1 capsule by mouth daily.      Turmeric 500 MG Oral Cap Take 1 capsule by mouth daily.      Bioflavonoid Products (BIOFLEX) Oral Tab Take 1 tablet by mouth daily.      Cholecalciferol (VITAMIN D3) 1000 UNITS Oral Cap Take 1 tablet by mouth daily.      Cinnamon 500 MG Oral Cap Take 1 capsule by mouth 2 (two) times daily.        Omega-3 Fatty Acids (FISH OIL TRIPLE STRENGTH) 1400 MG Oral Cap Take 1 capsule by mouth daily.      Multiple Vitamins-Minerals (CENTRUM SILVER ULTRA MENS) Oral Tab Take 1  tablet by mouth daily.      ZYRTEC 10 MG OR TABS Take 1 mg by mouth daily.      methylphenidate ER (CONCERTA) 27 MG Oral Tab CR Take 1 tablet (27 mg total) by mouth daily. 30 tablet 0    PEG 3350-KCl-Na Bicarb-NaCl 420 g Oral Recon Soln Take as directed by physician. (Patient not taking: Reported on 2025) 4000 mL 0      Past Medical History:    Allergic rhinitis, cause unspecified    Chronic fatigue    Controlled type 2 diabetes mellitus without complication, without long-term current use of insulin (HCC)    Diabetes (HCC)    Diverticulosis    Dwight Torrez M.D.    Essential hypertension    Fall    Infective otitis externa, unspecified    Internal hemorrhoids    Dwight Torrez M.D.    Intestinal disaccharidase deficiencies and disaccharide malabsorption    Obesity    Overweight(278.02)    Pre-ulcerative corn or callous    Preoperative examination, unspecified    Prostate cancer (HCC)    Psychosexual dysfunction with inhibited sexual excitement    Sleep apnea    Uncontrolled type 2 diabetes mellitus with hyperglycemia (HCC)      Social History:  Social History     Socioeconomic History    Marital status:    Tobacco Use    Smoking status: Former     Current packs/day: 0.00     Average packs/day: 1.5 packs/day for 5.0 years (7.5 ttl pk-yrs)     Types: Cigarettes     Start date: 1968     Quit date: 1973     Years since quittin.7    Smokeless tobacco: Never   Vaping Use    Vaping status: Never Used   Substance and Sexual Activity    Alcohol use: No     Alcohol/week: 0.0 standard drinks of alcohol    Drug use: No   Other Topics Concern     Service No    Blood Transfusions No    Caffeine Concern Yes     Comment: 0-1 teas daily    Occupational Exposure No    Hobby Hazards No    Sleep Concern No    Stress Concern No    Weight Concern No    Special Diet No    Back Care No    Exercise No     Comment: gym but has been on hold with holidays    Bike Helmet No    Seat Belt Yes    Self-Exams No      Social Drivers of Health     Food Insecurity: No Food Insecurity (1/27/2025)    NCSS - Food Insecurity     Worried About Running Out of Food in the Last Year: No     Ran Out of Food in the Last Year: No   Transportation Needs: No Transportation Needs (1/27/2025)    NCSS - Transportation     Lack of Transportation: No   Housing Stability: Not At Risk (1/27/2025)    NCSS - Housing/Utilities     Has Housing: Yes     Worried About Losing Housing: No     Unable to Get Utilities: No        REVIEW OF SYSTEMS:   GENERAL HEALTH: feels well otherwise  SKIN: denies any unusual skin lesions or rashes  RESPIRATORY: denies shortness of breath with exertion  CARDIOVASCULAR: denies chest pain on exertion  GI: denies abdominal pain and denies heartburn  NEURO: denies headaches  Psych see above  EXAM:   /64 (BP Location: Left arm, Patient Position: Sitting, Cuff Size: adult)   Pulse 70   Temp 97.3 °F (36.3 °C)   Resp 16   Ht 5' 5\" (1.651 m)   Wt 192 lb (87.1 kg)   SpO2 97%   BMI 31.95 kg/m²   GENERAL: well developed, well nourished,in no apparent distress  SKIN: no rashes,no suspicious lesions  HEENT: atraumatic, normocephalic,ears and throat are clear  NECK: supple,no adenopathy,no bruits  LUNGS: clear to auscultation  CARDIO: RRR without murmur  GI: good BS's,no masses, HSM or tenderness  EXTREMITIES: no cyanosis, clubbing mild 1+ edema to the left lower leg which is chronic and unchanged improved over time with weight loss  Bilateral barefoot skin diabetic exam is abnormal with dystrophic nails and/or dry skin and toenail fungus bilateral great toenails normal sensation with monofilament bilateral feet distal pulses 2+ bilateral    ASSESSMENT AND PLAN:     Encounter Diagnoses   Name Primary?    Controlled type 2 diabetes mellitus without complication, without long-term current use of insulin (HCC) Yes    Attention deficit hyperactivity disorder (ADHD), predominantly inattentive type     Hypothyroidism due to  Hashimoto's thyroiditis     Mixed hyperlipidemia     Essential hypertension with goal blood pressure less than 130/80     Coronary artery disease due to lipid rich plaque     Screening for deficiency anemia     DICK on CPAP        Orders Placed This Encounter   Procedures    CBC With Differential With Platelet    Comp Metabolic Panel (14)    Lipid Panel    TSH and Free T4    Microalb/Creat Ratio, Random Urine    Hemoglobin A1C       Meds & Refills for this Visit:  Requested Prescriptions     Signed Prescriptions Disp Refills    Dulaglutide (TRULICITY) 3 MG/0.5ML Subcutaneous Solution Auto-injector 2 mL 5     Sig: Inject 3 mg as directed every 7 days. inject 3mg as directed every 7 days    methylphenidate ER (CONCERTA) 27 MG Oral Tab CR 30 tablet 0     Sig: Take 1 tablet (27 mg total) by mouth daily.    methylphenidate ER (CONCERTA) 27 MG Oral Tab CR 30 tablet 0     Sig: Take 1 tablet (27 mg total) by mouth daily.    methylphenidate ER (CONCERTA) 27 MG Oral Tab CR 30 tablet 0     Sig: Take 1 tablet (27 mg total) by mouth daily.       Imaging & Consults:  None  1. Controlled type 2 diabetes mellitus without complication, without long-term current use of insulin (HCC)    Continue with checking feet daily and podiatrist visits  Eye exams yearly due 5/2025 continue with checking of feet daily   Trulicity 3 mg every 7 days continue with medication well-tolerated no side effects has had a decreased appetite and is losing weight blood sugars much improved  Continue with fasting blood sugar monitoring  Watch for any signs of hypoglycemia  Continue with atorvastatin 10 mg and metformin 1000 mg twice a day  Stressed importance of healthy diet and daily exercise start with 15 minutes a day on treadmill.  7/27/2025 hemoglobin A1c is due can be done in the office.  3/30/2025 labs are due  urine microalbumin creatinine ratio lipid and CMP  - Dulaglutide (TRULICITY) 3 MG/0.5ML Subcutaneous Solution Auto-injector; Inject 3 mg as  directed every 7 days. inject 3mg as directed every 7 days  Dispense: 2 mL; Refill: 5  - Microalb/Creat Ratio, Random Urine; Future  - Hemoglobin A1C; Future    2. Attention deficit hyperactivity disorder (ADHD), predominantly inattentive type  Refill on ADD medication provided patient tolerates well no side effects.  Patient is aware of risks including pulmonary hypertension.  Reviewed side effects including chest pain, shortness breath, dizziness, swelling hands and feet, tics, tremors, sleep disturbance, appetite suppression or mood changes such as anxiety or depression.   Review medication and cardiac risks with cardiologist has appointment next month Dr. Bass has asymptomatic stage II diastolic dysfunction  - methylphenidate ER (CONCERTA) 27 MG Oral Tab CR; Take 1 tablet (27 mg total) by mouth daily.  Dispense: 30 tablet; Refill: 0  - methylphenidate ER (CONCERTA) 27 MG Oral Tab CR; Take 1 tablet (27 mg total) by mouth daily.  Dispense: 30 tablet; Refill: 0  - methylphenidate ER (CONCERTA) 27 MG Oral Tab CR; Take 1 tablet (27 mg total) by mouth daily.  Dispense: 30 tablet; Refill: 0    3. Hypothyroidism due to Hashimoto's thyroiditis  - TSH and Free T4; Future    4. Mixed hyperlipidemia  Continue with atorvastatin 10 mg well-tolerated no side effects  - Lipid Panel; Future    5. Essential hypertension with goal blood pressure less than 130/80  Stage II diastolic dysfunction  - Comp Metabolic Panel (14); Future  Continue with amlodipine 5 mg, losartan 100 mg daily, hydrochlorothiazide 25 mg daily well-tolerated no side effect    6 Screening for deficiency anemia  - CBC With Differential With Platelet; Future  Provider patient relationship has had a longitudinal long term relationship to address and treat complex conditions.  Time spent was 45 minutes more than 50% was spent on counseling regarding medical conditions and treatment.  Rest of time was spent reviewing chart, reviewing blood work and radiology  tests.        The patient indicates understanding of these issues and agrees to the plan.  The patient is asked to return in 6 months on around 7/28/2025.

## 2025-02-03 DIAGNOSIS — E11.9 CONTROLLED TYPE 2 DIABETES MELLITUS WITHOUT COMPLICATION, WITHOUT LONG-TERM CURRENT USE OF INSULIN (HCC): ICD-10-CM

## 2025-02-03 RX ORDER — DULAGLUTIDE 3 MG/.5ML
INJECTION, SOLUTION SUBCUTANEOUS
Qty: 2 ML | Refills: 0 | OUTPATIENT
Start: 2025-02-03

## 2025-02-04 ENCOUNTER — OFFICE VISIT (OUTPATIENT)
Facility: LOCATION | Age: 75
End: 2025-02-04
Payer: MEDICARE

## 2025-02-04 ENCOUNTER — TELEPHONE (OUTPATIENT)
Dept: FAMILY MEDICINE CLINIC | Facility: CLINIC | Age: 75
End: 2025-02-04

## 2025-02-04 DIAGNOSIS — H61.23 BILATERAL IMPACTED CERUMEN: Primary | ICD-10-CM

## 2025-02-04 DIAGNOSIS — E11.9 CONTROLLED TYPE 2 DIABETES MELLITUS WITHOUT COMPLICATION, WITHOUT LONG-TERM CURRENT USE OF INSULIN (HCC): ICD-10-CM

## 2025-02-04 PROCEDURE — 92504 EAR MICROSCOPY EXAMINATION: CPT | Performed by: OTOLARYNGOLOGY

## 2025-02-04 PROCEDURE — 99214 OFFICE O/P EST MOD 30 MIN: CPT | Performed by: OTOLARYNGOLOGY

## 2025-02-04 RX ORDER — DULAGLUTIDE 3 MG/.5ML
3 INJECTION, SOLUTION SUBCUTANEOUS
Qty: 2 ML | Refills: 5 | Status: SHIPPED | OUTPATIENT
Start: 2025-02-04 | End: 2025-02-06

## 2025-02-04 RX ORDER — DULAGLUTIDE 3 MG/.5ML
INJECTION, SOLUTION SUBCUTANEOUS
Qty: 2 ML | Refills: 0 | OUTPATIENT
Start: 2025-02-04

## 2025-02-04 NOTE — PROGRESS NOTES
Yifan Leonardo is a 74 year old male. No chief complaint on file.    HPI:   74-year-old male with wax impaction otorrhea otalgia vertigo tinnitus wears hearing aids  Current Outpatient Medications   Medication Sig Dispense Refill    Dulaglutide (TRULICITY) 3 MG/0.5ML Subcutaneous Solution Auto-injector Inject 3 mg as directed every 7 days. inject 3mg as directed every 7 days 2 mL 5    methylphenidate ER (CONCERTA) 27 MG Oral Tab CR Take 1 tablet (27 mg total) by mouth daily. 30 tablet 0    [START ON 3/3/2025] methylphenidate ER (CONCERTA) 27 MG Oral Tab CR Take 1 tablet (27 mg total) by mouth daily. 30 tablet 0    [START ON 4/2/2025] methylphenidate ER (CONCERTA) 27 MG Oral Tab CR Take 1 tablet (27 mg total) by mouth daily. 30 tablet 0    hydroCHLOROthiazide 25 MG Oral Tab Take 1 tablet (25 mg total) by mouth every morning. 90 tablet 1    SERTRALINE 50 MG Oral Tab TAKE 1 TABLET(50 MG) BY MOUTH DAILY 90 tablet 1    LOSARTAN 100 MG Oral Tab TAKE 1 TABLET(100 MG) BY MOUTH DAILY 90 tablet 1    ATORVASTATIN 10 MG Oral Tab TAKE 1 TABLET(10 MG) BY MOUTH DAILY 90 tablet 3    tamsulosin 0.4 MG Oral Cap Take 1 capsule (0.4 mg total) by mouth every evening. Take 1/2 hour following the same meal each day 90 capsule 6    LEVOTHYROXINE 50 MCG Oral Tab TAKE 1 TABLET(50 MCG) BY MOUTH BEFORE BREAKFAST 90 tablet 2    AMLODIPINE 5 MG Oral Tab TAKE 1 TABLET(5 MG) BY MOUTH DAILY 90 tablet 1    METFORMIN HCL 1000 MG Oral Tab TAKE 1 TABLET(1000 MG) BY MOUTH TWICE DAILY WITH MEALS 180 tablet 1    PEG 3350-KCl-Na Bicarb-NaCl 420 g Oral Recon Soln Take as directed by physician. (Patient not taking: Reported on 1/27/2025) 4000 mL 0    ketoconazole 2 % External Cream Apply 1 Application topically daily as needed. 60 g 1    FreeStyle Lancets Does not apply Misc TEST ONCE DAILY AS DIRECTED 100 each 3    Glucose Blood (FREESTYLE LITE TEST) In Vitro Strip TEST ONCE DAILY AS DIRECTED 100 strip 3    lidocaine 5 % External Patch PLACE 1  PATCH ONTO THE SKIN DAILY. PUT ON FOR 12 HOURS THEN OFF FOR 12 HOURS 30 patch 5    Probiotic Product (PROBIOTIC PEARLS) Oral Cap Take 1 capsule by mouth daily.      Turmeric 500 MG Oral Cap Take 1 capsule by mouth daily.      Bioflavonoid Products (BIOFLEX) Oral Tab Take 1 tablet by mouth daily.      Cholecalciferol (VITAMIN D3) 1000 UNITS Oral Cap Take 1 tablet by mouth daily.      Cinnamon 500 MG Oral Cap Take 1 capsule by mouth 2 (two) times daily.        Omega-3 Fatty Acids (FISH OIL TRIPLE STRENGTH) 1400 MG Oral Cap Take 1 capsule by mouth daily.      Multiple Vitamins-Minerals (CENTRUM SILVER ULTRA MENS) Oral Tab Take 1 tablet by mouth daily.      ZYRTEC 10 MG OR TABS Take 1 mg by mouth daily.        Past Medical History:    Allergic rhinitis, cause unspecified    Chronic fatigue    Controlled type 2 diabetes mellitus without complication, without long-term current use of insulin (HCC)    Diabetes (HCC)    Diverticulosis    Dwight Torrez M.D.    Essential hypertension    Fall    Infective otitis externa, unspecified    Internal hemorrhoids    Dwight Torrez M.D.    Intestinal disaccharidase deficiencies and disaccharide malabsorption    Obesity    Overweight(278.02)    Pre-ulcerative corn or callous    Preoperative examination, unspecified    Prostate cancer (HCC)    Psychosexual dysfunction with inhibited sexual excitement    Sleep apnea    Uncontrolled type 2 diabetes mellitus with hyperglycemia (HCC)      Social History:  Social History     Socioeconomic History    Marital status:    Tobacco Use    Smoking status: Former     Current packs/day: 0.00     Average packs/day: 1.5 packs/day for 5.0 years (7.5 ttl pk-yrs)     Types: Cigarettes     Start date: 1968     Quit date: 1973     Years since quittin.7    Smokeless tobacco: Never   Vaping Use    Vaping status: Never Used   Substance and Sexual Activity    Alcohol use: No     Alcohol/week: 0.0 standard drinks of alcohol    Drug use: No    Other Topics Concern     Service No    Blood Transfusions No    Caffeine Concern Yes     Comment: 0-1 teas daily    Occupational Exposure No    Hobby Hazards No    Sleep Concern No    Stress Concern No    Weight Concern No    Special Diet No    Back Care No    Exercise No     Comment: gym but has been on hold with holidays    Bike Helmet No    Seat Belt Yes    Self-Exams No     Social Drivers of Health     Food Insecurity: No Food Insecurity (1/27/2025)    NCSS - Food Insecurity     Worried About Running Out of Food in the Last Year: No     Ran Out of Food in the Last Year: No   Transportation Needs: No Transportation Needs (1/27/2025)    NCSS - Transportation     Lack of Transportation: No   Housing Stability: Not At Risk (1/27/2025)    NCSS - Housing/Utilities     Has Housing: Yes     Worried About Losing Housing: No     Unable to Get Utilities: No      Past Surgical History:   Procedure Laterality Date    Colonoscopy  01/01/05    complete    Colonoscopy      Colonoscopy,diagnostic  04/25/2016    Due 04/25/2026; Dwight Torrez M.D.    Hernia surgery  01/01/08    inguinal    Hernia surgery  10/10    Hip replacement surgery  01/01/93    Total    Knee arthroscp [facility]  11/15/10    Total; Left; Laterality    Knee replacement surgery  2011    left TKA by Dr. Barlow    Leg/ankle surgery proc unlisted  01/01/60    Leg Osteotomy Tibial    Other surgical history      left tibial osteotomy as teen    Radiation  01/2022    Tonsillectomy      and adenoidectomy as child         REVIEW OF SYSTEMS:   GENERAL HEALTH: feels well otherwise  GENERAL : denies fever, chills, sweats, weight loss, weight gain  SKIN: denies any unusual skin lesions or rashes  RESPIRATORY: denies shortness of breath with exertion  NEURO: denies headaches    EXAM:   There were no vitals taken for this visit.    System Pertinent findings Details   Constitutional  Overall appearance - Normal.   Head/Face  Facial features -- Normal. Skull - Normal.    Eyes  Pupils equal ,round ,react to light and accomidate   Ears  External Ear Right: Normal, Left: Normal. Canal - Right: Normal, Left: Normal. TM - Right: Wax removed TM normal left: Wax removed TM normal   Nose  External Nose, Normal, Septum -midline,Nasal Vault, ear. Turbinates - Right: Normal left: Normal   Mouth/Throat  Lips/teeth/gums - Normal. Tonsils -0+ oropharynx - Normal.   Neck Exam  Inspection - Normal. Palpation - Normal. Parotid gland - Normal. Thyroid gland -normal   Lymph Detail  Submental. Submandibular. Anterior cervical. Posterior cervical. Supraclavicular.   Patients exam showed wax impaction right ear, wax impaction left ear. Ear wax was removed under the operative microscope. No complications. Patient tolerated the procedure well. Ear exam findings listed in note after removal.      ASSESSMENT AND PLAN:   1. Bilateral impacted cerumen  As needed follow-up      The patient indicates understanding of these issues and agrees to the plan.      Arsen Faustin MD  2/4/2025  1:11 PM

## 2025-02-04 NOTE — TELEPHONE ENCOUNTER
Patient calling would like [facility] medication to be sent to [facility].   Was sent to [facility] pharmacy [facility].    [facility] [address] [place] [address] - [id number] [address] [facility] [phone], [phone] [[id number]]

## 2025-02-06 ENCOUNTER — PATIENT MESSAGE (OUTPATIENT)
Dept: FAMILY MEDICINE CLINIC | Facility: CLINIC | Age: 75
End: 2025-02-06

## 2025-02-06 DIAGNOSIS — G47.33 OSA ON CPAP: ICD-10-CM

## 2025-02-06 DIAGNOSIS — I25.10 CORONARY ARTERY DISEASE DUE TO LIPID RICH PLAQUE: ICD-10-CM

## 2025-02-06 DIAGNOSIS — E11.9 CONTROLLED TYPE 2 DIABETES MELLITUS WITHOUT COMPLICATION, WITHOUT LONG-TERM CURRENT USE OF INSULIN (HCC): Primary | ICD-10-CM

## 2025-02-06 DIAGNOSIS — I25.83 CORONARY ARTERY DISEASE DUE TO LIPID RICH PLAQUE: ICD-10-CM

## 2025-02-06 RX ORDER — SEMAGLUTIDE 0.68 MG/ML
0.25 INJECTION, SOLUTION SUBCUTANEOUS WEEKLY
Qty: 3 ML | Refills: 1 | Status: SHIPPED | OUTPATIENT
Start: 2025-02-06

## 2025-02-14 ENCOUNTER — OFFICE VISIT (OUTPATIENT)
Dept: PODIATRY CLINIC | Facility: CLINIC | Age: 75
End: 2025-02-14

## 2025-02-14 DIAGNOSIS — E11.42 DIABETIC POLYNEUROPATHY ASSOCIATED WITH TYPE 2 DIABETES MELLITUS (HCC): Primary | ICD-10-CM

## 2025-02-14 DIAGNOSIS — M20.41 HAMMER TOES OF BOTH FEET: ICD-10-CM

## 2025-02-14 DIAGNOSIS — L60.3 NAIL DYSTROPHY: ICD-10-CM

## 2025-02-14 DIAGNOSIS — B35.1 ONYCHOMYCOSIS: ICD-10-CM

## 2025-02-14 DIAGNOSIS — M20.42 HAMMER TOES OF BOTH FEET: ICD-10-CM

## 2025-02-14 PROCEDURE — 99213 OFFICE O/P EST LOW 20 MIN: CPT

## 2025-02-14 NOTE — PROGRESS NOTES
[facility] Podiatry  Progress Note    Yifan Leonardo is a 74 year old male.   Chief Complaint   Patient presents with    Diabetic Foot Care     2 mo f/u - last UwG2N=4.2 from 1/22/25 and LOV with GIACOMO Huston was 1/27/25 - has no c/o regarding his feet - this AM his BS=90         HPI:     Patient is a pleasant 74-year-old male who presents to clinic for diabetic foot exam.  He has elongated and thickened nails that he has difficulty trimming on his own. He is ambulatory today in diabetic shoes.  His last hemoglobin A1c was 6.2% on 1/22/25. No other complaints are mentioned. Past medical history, medications, and allergies reviewed.      Allergies: Sulfa antibiotics and Zestril [lisinopril]   Current Outpatient Medications   Medication Sig Dispense Refill    semaglutide (OZEMPIC, 0.25 OR 0.5 MG/DOSE,) 2 MG/3ML Subcutaneous Solution Pen-injector Inject 0.25 mg into the skin once a week. After 4 weeks increase to 0.5 mg weekly 3 mL 1    methylphenidate ER (CONCERTA) 27 MG Oral Tab CR Take 1 tablet (27 mg total) by mouth daily. 30 tablet 0    [START ON 3/3/2025] methylphenidate ER (CONCERTA) 27 MG Oral Tab CR Take 1 tablet (27 mg total) by mouth daily. 30 tablet 0    [START ON 4/2/2025] methylphenidate ER (CONCERTA) 27 MG Oral Tab CR Take 1 tablet (27 mg total) by mouth daily. 30 tablet 0    hydroCHLOROthiazide 25 MG Oral Tab Take 1 tablet (25 mg total) by mouth every morning. 90 tablet 1    SERTRALINE 50 MG Oral Tab TAKE 1 TABLET(50 MG) BY MOUTH DAILY 90 tablet 1    LOSARTAN 100 MG Oral Tab TAKE 1 TABLET(100 MG) BY MOUTH DAILY 90 tablet 1    ATORVASTATIN 10 MG Oral Tab TAKE 1 TABLET(10 MG) BY MOUTH DAILY 90 tablet 3    tamsulosin 0.4 MG Oral Cap Take 1 capsule (0.4 mg total) by mouth every evening. Take 1/2 hour following the same meal each day 90 capsule 6    LEVOTHYROXINE 50 MCG Oral Tab TAKE 1 TABLET(50 MCG) BY MOUTH BEFORE BREAKFAST 90 tablet 2    AMLODIPINE 5 MG Oral Tab TAKE 1 TABLET(5 MG) BY  MOUTH DAILY 90 tablet 1    METFORMIN HCL 1000 MG Oral Tab TAKE 1 TABLET(1000 MG) BY MOUTH TWICE DAILY WITH MEALS 180 tablet 1    PEG 3350-KCl-Na Bicarb-NaCl 420 g Oral Recon Soln Take as directed by physician. (Patient not taking: Reported on 1/27/2025) 4000 mL 0    ketoconazole 2 % External Cream Apply 1 Application topically daily as needed. 60 g 1    FreeStyle Lancets Does not apply Misc TEST ONCE DAILY AS DIRECTED 100 each 3    Glucose Blood (FREESTYLE LITE TEST) In Vitro Strip TEST ONCE DAILY AS DIRECTED 100 strip 3    lidocaine 5 % External Patch PLACE 1 PATCH ONTO THE SKIN DAILY. PUT ON FOR 12 HOURS THEN OFF FOR 12 HOURS 30 patch 5    Probiotic Product (PROBIOTIC PEARLS) Oral Cap Take 1 capsule by mouth daily.      Turmeric 500 MG Oral Cap Take 1 capsule by mouth daily.      Bioflavonoid Products (BIOFLEX) Oral Tab Take 1 tablet by mouth daily.      Cholecalciferol (VITAMIN D3) 1000 UNITS Oral Cap Take 1 tablet by mouth daily.      Cinnamon 500 MG Oral Cap Take 1 capsule by mouth 2 (two) times daily.        Omega-3 Fatty Acids (FISH OIL TRIPLE STRENGTH) 1400 MG Oral Cap Take 1 capsule by mouth daily.      Multiple Vitamins-Minerals (CENTRUM SILVER ULTRA MENS) Oral Tab Take 1 tablet by mouth daily.      ZYRTEC 10 MG OR TABS Take 1 mg by mouth daily.        Past Medical History:    Allergic rhinitis, cause unspecified    Chronic fatigue    Controlled type 2 diabetes mellitus without complication, without long-term current use of insulin (HCC)    Diabetes (HCC)    Diverticulosis    Dwight Torrez M.D.    Essential hypertension    Fall    Infective otitis externa, unspecified    Internal hemorrhoids    Dwight Torrez M.D.    Intestinal disaccharidase deficiencies and disaccharide malabsorption    Obesity    Overweight(278.02)    Pre-ulcerative corn or callous    Preoperative examination, unspecified    Prostate cancer (HCC)    Psychosexual dysfunction with inhibited sexual excitement    Sleep apnea    Uncontrolled  type 2 diabetes mellitus with hyperglycemia (HCC)      Past Surgical History:   Procedure Laterality Date    Colonoscopy  05    complete    Colonoscopy      Colonoscopy,diagnostic  2016    Due 2026; Dwight Torrez M.D.    Hernia surgery  08    inguinal    Hernia surgery  10/10    Hip replacement surgery  93    Total    Knee arthroscp [facility]  11/15/10    Total; Left; Laterality    Knee replacement surgery      left TKA by Dr. Barlow    Leg/ankle surgery proc unlisted  61    Leg Osteotomy Tibial    Other surgical history      left tibial osteotomy as teen    Radiation  2022    Tonsillectomy      and adenoidectomy as child      Family History   Problem Relation Age of Onset    Colon Cancer Father     Hypertension Mother     Dementia Mother         alzheimers    Other (hypertension) Mother     Ovarian Cancer Sister     Hypertension Maternal Grandmother     Heart Disorder Maternal Grandmother         CAD    Heart Attack Maternal Grandmother     Heart Attack Maternal Grandfather     No Known Problems Son     No Known Problems Son     No Known Problems Son       Social History     Socioeconomic History    Marital status:    Tobacco Use    Smoking status: Former     Current packs/day: 0.00     Average packs/day: 1.5 packs/day for 5.0 years (7.5 ttl pk-yrs)     Types: Cigarettes     Start date: 1968     Quit date: 1973     Years since quittin.7    Smokeless tobacco: Never   Vaping Use    Vaping status: Never Used   Substance and Sexual Activity    Alcohol use: No     Alcohol/week: 0.0 standard drinks of alcohol    Drug use: No   Other Topics Concern     Service No    Blood Transfusions No    Caffeine Concern Yes     Comment: 0-1 teas daily    Occupational Exposure No    Hobby Hazards No    Sleep Concern No    Stress Concern No    Weight Concern No    Special Diet No    Back Care No    Exercise No     Comment: gym but has been on hold with holidays    Bike  Helmet No    Seat Belt Yes    Self-Exams No           REVIEW OF SYSTEMS:     Today reviewed systems as documented below  GENERAL HEALTH: feels well otherwise  SKIN: denies any unusual skin lesions or rashes  RESPIRATORY: denies shortness of breath with exertion  CARDIOVASCULAR: denies chest pain on exertion  GI: denies abdominal pain and denies heartburn  NEURO: denies headaches  MUSCULO: Acknowledges arthritis      EXAM:   There were no vitals taken for this visit.  GENERAL: well developed, well nourished, in no apparent distress  EXTREMITIES:  1. Integument: Normal skin temperature and turgor. Nails x 10 are elongated and thickened. Skin appears moist, warm, and supple with positive hair growth. There are no color changes. No open lesions. No macerations. No Hyperkeratotic lesions.   2. Vascular: Dorsalis pedis 2/4 bilateral and posterior tibial pulses 2/4 bilateral, capillary refill normal.  3. Neurological: Gross sensation intact via light touch bilaterally.  Normal sharp/dull sensation.  Diminished protective sensation to bilateral lower extremities  4. Musculoskeletal: All muscle groups are graded 5/5 in the foot and ankle.  Flexion contracture of lesser digits to bilateral feet      ASSESSMENT AND PLAN:   Diagnoses and all orders for this visit:    Diabetic polyneuropathy associated with type 2 diabetes mellitus (HCC)    Onychomycosis    Hammer toes of both feet    Nail dystrophy        Plan:   Plan:   - Patient was seen and evaluated today in clinic.  Chart history reviewed.    - At today's visit sharply debrided nails x10 with a sterile nail nipper achieving a 20% reduction in thickness and length, without incident. Slant back procedure performed to ingrown nails. Nails further smoothed with dremel  - Discussed importance of proper pedal hygiene, regular foot checks, and tight glucose control by following diet and medication regimen.   - Because the patient suffers from neuropathy, pt was educated to keep  his feet clean; ensure that he washes and dries between toes.  - Patient to avoid walking barefoot.  Continue ambulating with diabetic shoes and inserts.  -Discussed use of crest pads or toe sleeves to prevent pressure and friction and help prevent toes from rubbing together.  Dispensed crest pads to bilateral feet along with toe sleeves  -Educated patient on acute signs of infection.  Advised patient to seek immediate medical attention if any concerns arise.  - All of the patient's questions and concerns were addressed.  They indicated their understanding of these issues and agrees to the plan.      Time spent reviewing pertinent information from patient's chart, reviewing any pertinent imaging, obtaining history and physical exam, discussing and mutually agreeing on a treatment plan, and documenting encounter: 30 minutes    RTC 2 to 3 months      GIACOMO Byrd    [facility]            Dragon speech recognition software was used to prepare this note.  Errors in word recognition may occur.  Please contact me with any questions/concerns with this note.

## 2025-03-05 DIAGNOSIS — E11.9 CONTROLLED TYPE 2 DIABETES MELLITUS WITHOUT COMPLICATION, WITHOUT LONG-TERM CURRENT USE OF INSULIN (HCC): ICD-10-CM

## 2025-03-05 RX ORDER — LANCETS 28 GAUGE
EACH MISCELLANEOUS
Qty: 100 EACH | Refills: 3 | Status: SHIPPED | OUTPATIENT
Start: 2025-03-05

## 2025-03-12 ENCOUNTER — MED REC SCAN ONLY (OUTPATIENT)
Dept: FAMILY MEDICINE CLINIC | Facility: CLINIC | Age: 75
End: 2025-03-12

## 2025-03-22 DIAGNOSIS — I10 ESSENTIAL HYPERTENSION WITH GOAL BLOOD PRESSURE LESS THAN 130/80: ICD-10-CM

## 2025-03-24 RX ORDER — AMLODIPINE BESYLATE 5 MG/1
5 TABLET ORAL DAILY
Qty: 90 TABLET | Refills: 1 | Status: SHIPPED | OUTPATIENT
Start: 2025-03-24

## 2025-03-25 DIAGNOSIS — E11.65 UNCONTROLLED TYPE 2 DIABETES MELLITUS WITH HYPERGLYCEMIA (HCC): ICD-10-CM

## 2025-03-26 ENCOUNTER — LAB ENCOUNTER (OUTPATIENT)
Dept: LAB | Age: 75
End: 2025-03-26
Attending: FAMILY MEDICINE
Payer: MEDICARE

## 2025-03-26 DIAGNOSIS — C61 PROSTATE CANCER (HCC): ICD-10-CM

## 2025-03-26 LAB — PSA SERPL-MCNC: 0.11 NG/ML (ref ?–4)

## 2025-03-26 PROCEDURE — 84153 ASSAY OF PSA TOTAL: CPT

## 2025-03-26 PROCEDURE — 36415 COLL VENOUS BLD VENIPUNCTURE: CPT

## 2025-03-31 ENCOUNTER — OFFICE VISIT (OUTPATIENT)
Dept: SURGERY | Facility: CLINIC | Age: 75
End: 2025-03-31

## 2025-03-31 DIAGNOSIS — C61 PROSTATE CANCER (HCC): Primary | ICD-10-CM

## 2025-03-31 DIAGNOSIS — R39.12 WEAK URINARY STREAM: ICD-10-CM

## 2025-03-31 DIAGNOSIS — N52.9 ERECTILE DYSFUNCTION, UNSPECIFIED ERECTILE DYSFUNCTION TYPE: ICD-10-CM

## 2025-03-31 DIAGNOSIS — R97.20 ELEVATED PSA: ICD-10-CM

## 2025-03-31 DIAGNOSIS — R39.15 URINARY URGENCY: ICD-10-CM

## 2025-03-31 PROCEDURE — G2211 COMPLEX E/M VISIT ADD ON: HCPCS | Performed by: UROLOGY

## 2025-03-31 PROCEDURE — 99214 OFFICE O/P EST MOD 30 MIN: CPT | Performed by: UROLOGY

## 2025-03-31 PROCEDURE — 81003 URINALYSIS AUTO W/O SCOPE: CPT | Performed by: UROLOGY

## 2025-04-01 ENCOUNTER — OFFICE VISIT (OUTPATIENT)
Facility: LOCATION | Age: 75
End: 2025-04-01
Payer: MEDICARE

## 2025-04-01 DIAGNOSIS — H61.23 BILATERAL IMPACTED CERUMEN: Primary | ICD-10-CM

## 2025-04-01 PROCEDURE — 99214 OFFICE O/P EST MOD 30 MIN: CPT | Performed by: OTOLARYNGOLOGY

## 2025-04-01 PROCEDURE — 92504 EAR MICROSCOPY EXAMINATION: CPT | Performed by: OTOLARYNGOLOGY

## 2025-04-01 NOTE — PROGRESS NOTES
Yifan Leonardo is a 74 year old male. No chief complaint on file.    HPI:   74-year-old white male wears hearing aids he gets frequent wax impaction in for cleaning no other symptoms  Current Outpatient Medications   Medication Sig Dispense Refill    METFORMIN HCL 1000 MG Oral Tab TAKE 1 TABLET(1000 MG) BY MOUTH TWICE DAILY WITH MEALS 180 tablet 1    AMLODIPINE 5 MG Oral Tab TAKE 1 TABLET(5 MG) BY MOUTH DAILY 90 tablet 1    FREESTYLE LANCETS Does not apply Misc TEST ONCE DAILY AS DIRECTED 100 each 3    semaglutide (OZEMPIC, 0.25 OR 0.5 MG/DOSE,) 2 MG/3ML Subcutaneous Solution Pen-injector Inject 0.25 mg into the skin once a week. After 4 weeks increase to 0.5 mg weekly 3 mL 1    methylphenidate ER (CONCERTA) 27 MG Oral Tab CR Take 1 tablet (27 mg total) by mouth daily. 30 tablet 0    [START ON 4/2/2025] methylphenidate ER (CONCERTA) 27 MG Oral Tab CR Take 1 tablet (27 mg total) by mouth daily. 30 tablet 0    hydroCHLOROthiazide 25 MG Oral Tab Take 1 tablet (25 mg total) by mouth every morning. 90 tablet 1    SERTRALINE 50 MG Oral Tab TAKE 1 TABLET(50 MG) BY MOUTH DAILY 90 tablet 1    LOSARTAN 100 MG Oral Tab TAKE 1 TABLET(100 MG) BY MOUTH DAILY 90 tablet 1    ATORVASTATIN 10 MG Oral Tab TAKE 1 TABLET(10 MG) BY MOUTH DAILY 90 tablet 3    tamsulosin 0.4 MG Oral Cap Take 1 capsule (0.4 mg total) by mouth every evening. Take 1/2 hour following the same meal each day 90 capsule 6    LEVOTHYROXINE 50 MCG Oral Tab TAKE 1 TABLET(50 MCG) BY MOUTH BEFORE BREAKFAST 90 tablet 2    PEG 3350-KCl-Na Bicarb-NaCl 420 g Oral Recon Soln Take as directed by physician. (Patient not taking: Reported on 1/27/2025) 4000 mL 0    ketoconazole 2 % External Cream Apply 1 Application topically daily as needed. 60 g 1    Glucose Blood (FREESTYLE LITE TEST) In Vitro Strip TEST ONCE DAILY AS DIRECTED 100 strip 3    lidocaine 5 % External Patch PLACE 1 PATCH ONTO THE SKIN DAILY. PUT ON FOR 12 HOURS THEN OFF FOR 12 HOURS 30 patch 5     Probiotic Product (PROBIOTIC PEARLS) Oral Cap Take 1 capsule by mouth daily.      Turmeric 500 MG Oral Cap Take 1 capsule by mouth daily.      Bioflavonoid Products (BIOFLEX) Oral Tab Take 1 tablet by mouth daily.      Cholecalciferol (VITAMIN D3) 1000 UNITS Oral Cap Take 1 tablet by mouth daily.      Cinnamon 500 MG Oral Cap Take 1 capsule by mouth 2 (two) times daily.        Omega-3 Fatty Acids (FISH OIL TRIPLE STRENGTH) 1400 MG Oral Cap Take 1 capsule by mouth daily.      Multiple Vitamins-Minerals (CENTRUM SILVER ULTRA MENS) Oral Tab Take 1 tablet by mouth daily.      ZYRTEC 10 MG OR TABS Take 1 mg by mouth daily.        Past Medical History:    Allergic rhinitis, cause unspecified    Chronic fatigue    Controlled type 2 diabetes mellitus without complication, without long-term current use of insulin (HCC)    Diabetes (HCC)    Diverticulosis    Dwight Torrez M.D.    Essential hypertension    Fall    Infective otitis externa, unspecified    Internal hemorrhoids    Dwight Torrez M.D.    Intestinal disaccharidase deficiencies and disaccharide malabsorption    Obesity    Overweight(278.02)    Pre-ulcerative corn or callous    Preoperative examination, unspecified    Prostate cancer (HCC)    Psychosexual dysfunction with inhibited sexual excitement    Sleep apnea    Uncontrolled type 2 diabetes mellitus with hyperglycemia (HCC)      Social History:  Social History     Socioeconomic History    Marital status:    Tobacco Use    Smoking status: Former     Current packs/day: 0.00     Average packs/day: 1.5 packs/day for 5.0 years (7.5 ttl pk-yrs)     Types: Cigarettes     Start date: 1968     Quit date: 1973     Years since quittin.9    Smokeless tobacco: Never   Vaping Use    Vaping status: Never Used   Substance and Sexual Activity    Alcohol use: No     Alcohol/week: 0.0 standard drinks of alcohol    Drug use: No   Other Topics Concern     Service No    Blood Transfusions No    Caffeine  Concern Yes     Comment: 0-1 teas daily    Occupational Exposure No    Hobby Hazards No    Sleep Concern No    Stress Concern No    Weight Concern No    Special Diet No    Back Care No    Exercise No     Comment: gym but has been on hold with holidays    Bike Helmet No    Seat Belt Yes    Self-Exams No     Social Drivers of Health     Food Insecurity: No Food Insecurity (1/27/2025)    NCSS - Food Insecurity     Worried About Running Out of Food in the Last Year: No     Ran Out of Food in the Last Year: No   Transportation Needs: No Transportation Needs (1/27/2025)    NCSS - Transportation     Lack of Transportation: No   Housing Stability: Not At Risk (1/27/2025)    NCSS - Housing/Utilities     Has Housing: Yes     Worried About Losing Housing: No     Unable to Get Utilities: No      Past Surgical History:   Procedure Laterality Date    Colonoscopy  01/01/05    complete    Colonoscopy      Colonoscopy,diagnostic  04/25/2016    Due 04/25/2026; Dwight Torrez M.D.    Hernia surgery  01/01/08    inguinal    Hernia surgery  10/10    Hip replacement surgery  01/01/93    Total    Knee arthroscp [facility]  11/15/10    Total; Left; Laterality    Knee replacement surgery  2011    left TKA by Dr. Barlow    Leg/ankle surgery proc unlisted  01/01/60    Leg Osteotomy Tibial    Other surgical history      left tibial osteotomy as teen    Radiation  01/2022    Tonsillectomy      and adenoidectomy as child         REVIEW OF SYSTEMS:   GENERAL HEALTH: feels well otherwise  GENERAL : denies fever, chills, sweats, weight loss, weight gain  SKIN: denies any unusual skin lesions or rashes  RESPIRATORY: denies shortness of breath with exertion  NEURO: denies headaches    EXAM:   There were no vitals taken for this visit.    System Pertinent findings Details   Constitutional  Overall appearance - Normal.   Head/Face  Facial features -- Normal. Skull - Normal.   Eyes  Pupils equal ,round ,react to light and accomidate   Ears  External Ear Right:  Normal, Left: Normal. Canal - Right: Normal, Left: Normal. TM - Right: Wax removed TM normal left: Removed TM normal   Nose  External Nose, Normal, Septum -midline,Nasal Vault, clear. Turbinates - Right: Normal left: Normal   Mouth/Throat  Lips/teeth/gums - Normal. Tonsils -0+ oropharynx - Normal.   Neck Exam  Inspection - Normal. Palpation - Normal. Parotid gland - Normal. Thyroid gland -normal   Lymph Detail  Submental. Submandibular. Anterior cervical. Posterior cervical. Supraclavicular.   Patients exam showed wax impaction right ear, wax impaction left ear. Ear wax was removed under the operative microscope. No complications. Patient tolerated the procedure well. Ear exam findings listed in note after removal.      ASSESSMENT AND PLAN:   1. Bilateral impacted cerumen  As needed follow-up      The patient indicates understanding of these issues and agrees to the plan.      Arsen Faustin MD  4/1/2025  12:55 PM

## 2025-04-23 ENCOUNTER — OFFICE VISIT (OUTPATIENT)
Facility: LOCATION | Age: 75
End: 2025-04-23
Payer: MEDICARE

## 2025-04-23 DIAGNOSIS — L60.3 NAIL DYSTROPHY: ICD-10-CM

## 2025-04-23 DIAGNOSIS — M20.41 HAMMER TOES OF BOTH FEET: ICD-10-CM

## 2025-04-23 DIAGNOSIS — B35.1 ONYCHOMYCOSIS: Primary | ICD-10-CM

## 2025-04-23 DIAGNOSIS — E11.42 DIABETIC POLYNEUROPATHY ASSOCIATED WITH TYPE 2 DIABETES MELLITUS (HCC): ICD-10-CM

## 2025-04-23 DIAGNOSIS — M20.42 HAMMER TOES OF BOTH FEET: ICD-10-CM

## 2025-04-23 PROCEDURE — 99213 OFFICE O/P EST LOW 20 MIN: CPT

## 2025-04-23 NOTE — PROGRESS NOTES
[facility]  Progress Note    Yifan Leonardo is a 74 year old male.   Chief Complaint   Patient presents with    Diabetic Foot Care     Nail care and foot check - no c/o - no pain - no numbness or tingling - FBS = 103         HPI:     Patient is a pleasant 74-year-old male who presents to clinic for diabetic foot exam.  He has elongated and thickened nails that he has difficulty trimming on his own. He is ambulatory today in diabetic shoes.  Has been utilizing crest pads to bilateral feet. Has not been using toe sleeve as he mentions that it is difficult to put on. His last hemoglobin A1c was 6.2% on 1/22/25, fasting blood sugar this morning was 103. Patient mentions that he thinks he has an ingrown to the right foot hallux medial border- no redness or drainage. No other complaints are mentioned. Past medical history, medications, and allergies reviewed.      Allergies: Sulfa antibiotics and Zestril [lisinopril]   Current Outpatient Medications   Medication Sig Dispense Refill    METFORMIN HCL 1000 MG Oral Tab TAKE 1 TABLET(1000 MG) BY MOUTH TWICE DAILY WITH MEALS 180 tablet 1    AMLODIPINE 5 MG Oral Tab TAKE 1 TABLET(5 MG) BY MOUTH DAILY 90 tablet 1    FREESTYLE LANCETS Does not apply Misc TEST ONCE DAILY AS DIRECTED 100 each 3    semaglutide (OZEMPIC, 0.25 OR 0.5 MG/DOSE,) 2 MG/3ML Subcutaneous Solution Pen-injector Inject 0.25 mg into the skin once a week. After 4 weeks increase to 0.5 mg weekly 3 mL 1    methylphenidate ER (CONCERTA) 27 MG Oral Tab CR Take 1 tablet (27 mg total) by mouth daily. 30 tablet 0    hydroCHLOROthiazide 25 MG Oral Tab Take 1 tablet (25 mg total) by mouth every morning. 90 tablet 1    SERTRALINE 50 MG Oral Tab TAKE 1 TABLET(50 MG) BY MOUTH DAILY 90 tablet 1    LOSARTAN 100 MG Oral Tab TAKE 1 TABLET(100 MG) BY MOUTH DAILY 90 tablet 1    ATORVASTATIN 10 MG Oral Tab TAKE 1 TABLET(10 MG) BY MOUTH DAILY 90 tablet 3    tamsulosin 0.4 MG Oral Cap Take 1 capsule (0.4 mg  total) by mouth every evening. Take 1/2 hour following the same meal each day 90 capsule 6    LEVOTHYROXINE 50 MCG Oral Tab TAKE 1 TABLET(50 MCG) BY MOUTH BEFORE BREAKFAST 90 tablet 2    PEG 3350-KCl-Na Bicarb-NaCl 420 g Oral Recon Soln Take as directed by physician. 4000 mL 0    ketoconazole 2 % External Cream Apply 1 Application topically daily as needed. 60 g 1    Glucose Blood (FREESTYLE LITE TEST) In Vitro Strip TEST ONCE DAILY AS DIRECTED 100 strip 3    lidocaine 5 % External Patch PLACE 1 PATCH ONTO THE SKIN DAILY. PUT ON FOR 12 HOURS THEN OFF FOR 12 HOURS 30 patch 5    Probiotic Product (PROBIOTIC PEARLS) Oral Cap Take 1 capsule by mouth daily.      Turmeric 500 MG Oral Cap Take 1 capsule by mouth daily.      Bioflavonoid Products (BIOFLEX) Oral Tab Take 1 tablet by mouth daily.      Cholecalciferol (VITAMIN D3) 1000 UNITS Oral Cap Take 1 tablet by mouth daily.      Cinnamon 500 MG Oral Cap Take 1 capsule by mouth 2 (two) times daily.        Omega-3 Fatty Acids (FISH OIL TRIPLE STRENGTH) 1400 MG Oral Cap Take 1 capsule by mouth daily.      Multiple Vitamins-Minerals (CENTRUM SILVER ULTRA MENS) Oral Tab Take 1 tablet by mouth daily.      ZYRTEC 10 MG OR TABS Take 1 mg by mouth daily.        Past Medical History:    Allergic rhinitis, cause unspecified    Chronic fatigue    Controlled type 2 diabetes mellitus without complication, without long-term current use of insulin (HCC)    Diabetes (HCC)    Diverticulosis    Dwight Torrez M.D.    Essential hypertension    Fall    Infective otitis externa, unspecified    Internal hemorrhoids    Dwight Torrez M.D.    Intestinal disaccharidase deficiencies and disaccharide malabsorption    Obesity    Overweight(278.02)    Pre-ulcerative corn or callous    Preoperative examination, unspecified    Prostate cancer (HCC)    Psychosexual dysfunction with inhibited sexual excitement    Sleep apnea    Uncontrolled type 2 diabetes mellitus with hyperglycemia (HCC)      Past Surgical  History:   Procedure Laterality Date    Colonoscopy  05    complete    Colonoscopy      Colonoscopy,diagnostic  2016    Due 2026; Dwight Torrez M.D.    Hernia surgery  08    inguinal    Hernia surgery  10/10    Hip replacement surgery  93    Total    Knee arthroscp harv  11/15/10    Total; Left; Laterality    Knee replacement surgery      left TKA by Dr. Barlow    Leg/ankle surgery proc unlisted  61    Leg Osteotomy Tibial    Other surgical history      left tibial osteotomy as teen    Radiation  2022    Tonsillectomy      and adenoidectomy as child      Family History   Problem Relation Age of Onset    Colon Cancer Father     Hypertension Mother     Dementia Mother         alzheimers    Other (hypertension) Mother     Ovarian Cancer Sister     Hypertension Maternal Grandmother     Heart Disorder Maternal Grandmother         CAD    Heart Attack Maternal Grandmother     Heart Attack Maternal Grandfather     No Known Problems Son     No Known Problems Son     No Known Problems Son       Social History     Socioeconomic History    Marital status:    Tobacco Use    Smoking status: Former     Current packs/day: 0.00     Average packs/day: 1.5 packs/day for 5.0 years (7.5 ttl pk-yrs)     Types: Cigarettes     Start date: 1968     Quit date: 1973     Years since quittin.9    Smokeless tobacco: Never   Vaping Use    Vaping status: Never Used   Substance and Sexual Activity    Alcohol use: No     Alcohol/week: 0.0 standard drinks of alcohol    Drug use: No   Other Topics Concern     Service No    Blood Transfusions No    Caffeine Concern Yes     Comment: 0-1 teas daily    Occupational Exposure No    Hobby Hazards No    Sleep Concern No    Stress Concern No    Weight Concern No    Special Diet No    Back Care No    Exercise No     Comment: gym but has been on hold with holidays    Bike Helmet No    Seat Belt Yes    Self-Exams No           REVIEW OF  SYSTEMS:     Today reviewed systems as documented below  GENERAL HEALTH: feels well otherwise  SKIN: denies any unusual skin lesions or rashes  RESPIRATORY: denies shortness of breath with exertion  CARDIOVASCULAR: denies chest pain on exertion  GI: denies abdominal pain and denies heartburn  NEURO: denies headaches  MUSCULO: Acknowledges arthritis      EXAM:   There were no vitals taken for this visit.  GENERAL: well developed, well nourished, in no apparent distress  EXTREMITIES:  1. Integument: Normal skin temperature and turgor. Nails x 10 are elongated and thickened. Skin appears moist, warm, and supple with positive hair growth. There are no color changes. No open lesions. No macerations. No Hyperkeratotic lesions.   2. Vascular: Dorsalis pedis 2/4 bilateral and posterior tibial pulses 2/4 bilateral, capillary refill normal.  3. Neurological: Gross sensation intact via light touch bilaterally.  Normal sharp/dull sensation.  Diminished protective sensation to bilateral lower extremities  4. Musculoskeletal: All muscle groups are graded 5/5 in the foot and ankle.  Flexion contracture of lesser digits to bilateral feet      ASSESSMENT AND PLAN:   Diagnoses and all orders for this visit:    Onychomycosis    Hammer toes of both feet    Nail dystrophy    Diabetic polyneuropathy associated with type 2 diabetes mellitus (HCC)        Plan:   Plan:   - Patient was seen and evaluated today in clinic.  Chart history reviewed.    - At today's visit sharply debrided nails x10 with a sterile nail nipper achieving a 20% reduction in thickness and length, without incident. Slant back procedure performed to ingrown nails. Right foot hallux medial border towards the nail bed has ingrown nail. Was able to remove this a small amount of bleeding that was controlled with pressure. This area is dressed with bacitracin and band-aid. Patient instructed to continue to utilize bacitracin and ointment for a few more days. Nails further  smoothed with vladimir board.   - Discussed importance of proper pedal hygiene, regular foot checks, and tight glucose control by following diet and medication regimen.   - Because the patient suffers from neuropathy, pt was educated to keep his feet clean; ensure that he washes and dries between toes.  - Continue ambulating with diabetic shoes and inserts.  -Continue use of crest pads to bilateral feet and toe sleeve to right foot third digit to prevent pressure and friction and help prevent toes from rubbing together.     -Educated patient on acute signs of infection.  Advised patient to seek immediate medical attention if any concerns arise.  - All of the patient's questions and concerns were addressed.  They indicated their understanding of these issues and agrees to the plan.      Time spent reviewing pertinent information from patient's chart, reviewing any pertinent imaging, obtaining history and physical exam, discussing and mutually agreeing on a treatment plan, and documenting encounter: 20 minutes    RTC 2 to 3 months      GIACOMO Byrd    [place]            Dragon speech recognition software was used to prepare this note.  Errors in word recognition may occur.  Please contact me with any questions/concerns with this note.

## 2025-04-28 DIAGNOSIS — G47.33 OSA ON CPAP: ICD-10-CM

## 2025-04-28 DIAGNOSIS — I25.10 CORONARY ARTERY DISEASE DUE TO LIPID RICH PLAQUE: ICD-10-CM

## 2025-04-28 DIAGNOSIS — E11.9 CONTROLLED TYPE 2 DIABETES MELLITUS WITHOUT COMPLICATION, WITHOUT LONG-TERM CURRENT USE OF INSULIN (HCC): ICD-10-CM

## 2025-04-28 DIAGNOSIS — F90.0 ATTENTION DEFICIT HYPERACTIVITY DISORDER (ADHD), PREDOMINANTLY INATTENTIVE TYPE: ICD-10-CM

## 2025-04-28 DIAGNOSIS — I25.83 CORONARY ARTERY DISEASE DUE TO LIPID RICH PLAQUE: ICD-10-CM

## 2025-04-28 RX ORDER — METHYLPHENIDATE HYDROCHLORIDE 27 MG/1
27 TABLET ORAL DAILY
Qty: 30 TABLET | Refills: 0 | Status: SHIPPED | OUTPATIENT
Start: 2025-04-28 | End: 2025-05-28

## 2025-04-28 NOTE — TELEPHONE ENCOUNTER
Requested Prescriptions     Pending Prescriptions Disp Refills    methylphenidate ER (CONCERTA) 27 MG Oral Tab CR 30 tablet 0     Sig: Take 1 tablet (27 mg total) by mouth daily.       Last Refill: 1/27/25    Last OV: 1/27/25

## 2025-05-05 DIAGNOSIS — I25.83 CORONARY ARTERY DISEASE DUE TO LIPID RICH PLAQUE: ICD-10-CM

## 2025-05-05 DIAGNOSIS — E11.9 CONTROLLED TYPE 2 DIABETES MELLITUS WITHOUT COMPLICATION, WITHOUT LONG-TERM CURRENT USE OF INSULIN (HCC): ICD-10-CM

## 2025-05-05 DIAGNOSIS — I25.10 CORONARY ARTERY DISEASE DUE TO LIPID RICH PLAQUE: ICD-10-CM

## 2025-05-05 DIAGNOSIS — G47.33 OSA ON CPAP: ICD-10-CM

## 2025-05-06 RX ORDER — SEMAGLUTIDE 0.68 MG/ML
0.25 INJECTION, SOLUTION SUBCUTANEOUS WEEKLY
Qty: 3 ML | Refills: 1 | OUTPATIENT
Start: 2025-05-06

## 2025-05-06 RX ORDER — SEMAGLUTIDE 0.68 MG/ML
0.25 INJECTION, SOLUTION SUBCUTANEOUS WEEKLY
Qty: 3 ML | Refills: 0 | OUTPATIENT
Start: 2025-05-06

## 2025-05-06 RX ORDER — SEMAGLUTIDE 0.68 MG/ML
0.5 INJECTION, SOLUTION SUBCUTANEOUS WEEKLY
Qty: 9 ML | Refills: 0 | Status: SHIPPED | OUTPATIENT
Start: 2025-05-06

## 2025-05-06 NOTE — TELEPHONE ENCOUNTER
Requested Prescriptions     Pending Prescriptions Disp Refills    semaglutide (OZEMPIC, 0.25 OR 0.5 MG/DOSE,) 2 MG/3ML Subcutaneous Solution Pen-injector 3 mL 1     Sig: Inject 0.25 mg into the skin once a week. After 4 weeks increase to 0.5 mg weekly       Last Refill: 2/6/25    Last OV: 1/27/25    Next OV: 7/28    Patient doing well on this dose.

## 2025-05-09 ENCOUNTER — PATIENT MESSAGE (OUTPATIENT)
Dept: FAMILY MEDICINE CLINIC | Facility: CLINIC | Age: 75
End: 2025-05-09

## 2025-06-03 ENCOUNTER — OFFICE VISIT (OUTPATIENT)
Facility: LOCATION | Age: 75
End: 2025-06-03
Payer: MEDICARE

## 2025-06-03 DIAGNOSIS — H61.23 BILATERAL IMPACTED CERUMEN: Primary | ICD-10-CM

## 2025-06-03 PROCEDURE — 92504 EAR MICROSCOPY EXAMINATION: CPT | Performed by: OTOLARYNGOLOGY

## 2025-06-03 PROCEDURE — 99214 OFFICE O/P EST MOD 30 MIN: CPT | Performed by: OTOLARYNGOLOGY

## 2025-06-03 NOTE — PROGRESS NOTES
Yifan Leonardo is a 75 year old male. No chief complaint on file.    HPI:   75-year-old male longstanding wax patient wears hearing aids.  Current Medications[1]   Past Medical History[2]   Social History:  Short Social Hx on File[3]   Past Surgical History[4]      REVIEW OF SYSTEMS:   GENERAL HEALTH: feels well otherwise  GENERAL : denies fever, chills, sweats, weight loss, weight gain  SKIN: denies any unusual skin lesions or rashes  RESPIRATORY: denies shortness of breath with exertion  NEURO: denies headaches    EXAM:   There were no vitals taken for this visit.    System Pertinent findings Details   Constitutional  Overall appearance - Normal.   Head/Face  Facial features -- Normal. Skull - Normal.   Eyes  Pupils equal ,round ,react to light and accomidate   Ears  External Ear Right: Normal, Left: Normal. Canal - Right: Normal, Left: Normal. TM - Right: Wax removed TM normal left: Removed TM normal   Nose  External Nose, Normal, Septum -midline,Nasal Vault, clear. Turbinates - Right: Normal left: Normal   Mouth/Throat  Lips/teeth/gums - Normal. Tonsils -0+ oropharynx - Normal.   Neck Exam  Inspection - Normal. Palpation - Normal. Parotid gland - Normal. Thyroid gland -normal   Lymph Detail  Submental. Submandibular. Anterior cervical. Posterior cervical. Supraclavicular.   Patients exam showed wax impaction right ear, wax impaction left ear. Ear wax was removed under the operative microscope. No complications. Patient tolerated the procedure well. Ear exam findings listed in note after removal.      ASSESSMENT AND PLAN:   1. Bilateral impacted cerumen  Patient follows up every 3 to 4 months      The patient indicates understanding of these issues and agrees to the plan.      Arsen Faustin MD  6/3/2025  11:51 AM       [1]   Current Outpatient Medications   Medication Sig Dispense Refill    semaglutide (OZEMPIC, 0.25 OR 0.5 MG/DOSE,) 2 MG/3ML Subcutaneous Solution Pen-injector Inject 0.5 mg into  the skin once a week. 9 mL 0    METFORMIN HCL 1000 MG Oral Tab TAKE 1 TABLET(1000 MG) BY MOUTH TWICE DAILY WITH MEALS 180 tablet 1    AMLODIPINE 5 MG Oral Tab TAKE 1 TABLET(5 MG) BY MOUTH DAILY 90 tablet 1    FREESTYLE LANCETS Does not apply Misc TEST ONCE DAILY AS DIRECTED 100 each 3    hydroCHLOROthiazide 25 MG Oral Tab Take 1 tablet (25 mg total) by mouth every morning. 90 tablet 1    SERTRALINE 50 MG Oral Tab TAKE 1 TABLET(50 MG) BY MOUTH DAILY 90 tablet 1    LOSARTAN 100 MG Oral Tab TAKE 1 TABLET(100 MG) BY MOUTH DAILY 90 tablet 1    ATORVASTATIN 10 MG Oral Tab TAKE 1 TABLET(10 MG) BY MOUTH DAILY 90 tablet 3    tamsulosin 0.4 MG Oral Cap Take 1 capsule (0.4 mg total) by mouth every evening. Take 1/2 hour following the same meal each day 90 capsule 6    LEVOTHYROXINE 50 MCG Oral Tab TAKE 1 TABLET(50 MCG) BY MOUTH BEFORE BREAKFAST 90 tablet 2    PEG 3350-KCl-Na Bicarb-NaCl 420 g Oral Recon Soln Take as directed by physician. 4000 mL 0    ketoconazole 2 % External Cream Apply 1 Application topically daily as needed. 60 g 1    Glucose Blood (FREESTYLE LITE TEST) In Vitro Strip TEST ONCE DAILY AS DIRECTED 100 strip 3    lidocaine 5 % External Patch PLACE 1 PATCH ONTO THE SKIN DAILY. PUT ON FOR 12 HOURS THEN OFF FOR 12 HOURS 30 patch 5    Probiotic Product (PROBIOTIC PEARLS) Oral Cap Take 1 capsule by mouth daily.      Turmeric 500 MG Oral Cap Take 1 capsule by mouth daily.      Bioflavonoid Products (BIOFLEX) Oral Tab Take 1 tablet by mouth daily.      Cholecalciferol (VITAMIN D3) 1000 UNITS Oral Cap Take 1 tablet by mouth daily.      Cinnamon 500 MG Oral Cap Take 1 capsule by mouth 2 (two) times daily.        Omega-3 Fatty Acids (FISH OIL TRIPLE STRENGTH) 1400 MG Oral Cap Take 1 capsule by mouth daily.      Multiple Vitamins-Minerals (CENTRUM SILVER ULTRA MENS) Oral Tab Take 1 tablet by mouth daily.      ZYRTEC 10 MG OR TABS Take 1 mg by mouth daily.     [2]   Past Medical History:   Allergic rhinitis, cause  unspecified    Chronic fatigue    Controlled type 2 diabetes mellitus without complication, without long-term current use of insulin (HCC)    Diabetes (HCC)    Diverticulosis    Dwight Torrez M.D.    Essential hypertension    Fall    Infective otitis externa, unspecified    Internal hemorrhoids    Dwight Torrez M.D.    Intestinal disaccharidase deficiencies and disaccharide malabsorption    Obesity    Overweight(278.02)    Pre-ulcerative corn or callous    Preoperative examination, unspecified    Prostate cancer (HCC)    Psychosexual dysfunction with inhibited sexual excitement    Sleep apnea    Uncontrolled type 2 diabetes mellitus with hyperglycemia (HCC)   [3]   Social History  Socioeconomic History    Marital status:    Tobacco Use    Smoking status: Former     Current packs/day: 0.00     Average packs/day: 1.5 packs/day for 5.0 years (7.5 ttl pk-yrs)     Types: Cigarettes     Start date: 1968     Quit date: 1973     Years since quittin.0    Smokeless tobacco: Never   Vaping Use    Vaping status: Never Used   Substance and Sexual Activity    Alcohol use: No     Alcohol/week: 0.0 standard drinks of alcohol    Drug use: No   Other Topics Concern     Service No    Blood Transfusions No    Caffeine Concern Yes     Comment: 0-1 teas daily    Occupational Exposure No    Hobby Hazards No    Sleep Concern No    Stress Concern No    Weight Concern No    Special Diet No    Back Care No    Exercise No     Comment: gym but has been on hold with holidays    Bike Helmet No    Seat Belt Yes    Self-Exams No     Social Drivers of Health     Food Insecurity: No Food Insecurity (2025)    NCSS - Food Insecurity     Worried About Running Out of Food in the Last Year: No     Ran Out of Food in the Last Year: No   Transportation Needs: No Transportation Needs (2025)    NCSS - Transportation     Lack of Transportation: No   Housing Stability: Not At Risk (2025)    NCSS - Housing/Utilities      Has Housing: Yes     Worried About Losing Housing: No     Unable to Get Utilities: No   [4]   Past Surgical History:  Procedure Laterality Date    Colonoscopy  01/01/05    complete    Colonoscopy      Colonoscopy,diagnostic  04/25/2016    Due 04/25/2026; Dwight Torrez M.D.    Hernia surgery  01/01/08    inguinal    Hernia surgery  10/10    Hip replacement surgery  01/01/93    Total    Knee arthroscp harv  11/15/10    Total; Left; Laterality    Knee replacement surgery  2011    left TKA by Dr. Barlow    Leg/ankle surgery proc unlisted  01/01/60    Leg Osteotomy Tibial    Other surgical history      left tibial osteotomy as teen    Radiation  01/2022    Tonsillectomy      and adenoidectomy as child

## 2025-06-04 DIAGNOSIS — E11.9 CONTROLLED TYPE 2 DIABETES MELLITUS WITHOUT COMPLICATION, WITHOUT LONG-TERM CURRENT USE OF INSULIN (HCC): ICD-10-CM

## 2025-06-04 DIAGNOSIS — F90.0 ATTENTION DEFICIT HYPERACTIVITY DISORDER (ADHD), PREDOMINANTLY INATTENTIVE TYPE: ICD-10-CM

## 2025-06-04 NOTE — TELEPHONE ENCOUNTER
Yifan Leonardo requesting medication refill for methylphenidate ER (CONCERTA) 27 MG Oral Tab CR () .    LOV: 2025   Prescribed By: Yandy Huston PA-C   Pharmacy Location: [facility]    Next Appointment: 2025 Yandy Huston PA-C      Informed patient to allow up to 24 to 48 business hours before checking with Pharmacy.

## 2025-06-05 RX ORDER — BLOOD-GLUCOSE METER
1 KIT MISCELLANEOUS DAILY
Qty: 100 STRIP | Refills: 3 | Status: SHIPPED | OUTPATIENT
Start: 2025-06-05

## 2025-06-05 RX ORDER — METHYLPHENIDATE HYDROCHLORIDE 27 MG/1
27 TABLET ORAL DAILY
Qty: 30 TABLET | Refills: 0 | Status: SHIPPED | OUTPATIENT
Start: 2025-06-05 | End: 2025-07-05

## 2025-06-05 NOTE — TELEPHONE ENCOUNTER
Requested Prescriptions     Pending Prescriptions Disp Refills    methylphenidate ER (CONCERTA) 27 MG Oral Tab CR 30 tablet 0     Sig: Take 1 tablet (27 mg total) by mouth daily.       Last Refill: 4/28    Last OV: 1/27    Next OV: 7/28

## 2025-06-17 ENCOUNTER — MED REC SCAN ONLY (OUTPATIENT)
Dept: FAMILY MEDICINE CLINIC | Facility: CLINIC | Age: 75
End: 2025-06-17

## 2025-06-20 DIAGNOSIS — F41.1 GAD (GENERALIZED ANXIETY DISORDER): ICD-10-CM

## 2025-06-20 DIAGNOSIS — I10 ESSENTIAL HYPERTENSION WITH GOAL BLOOD PRESSURE LESS THAN 130/80: ICD-10-CM

## 2025-06-20 DIAGNOSIS — E06.3 HYPOTHYROIDISM DUE TO HASHIMOTO'S THYROIDITIS: ICD-10-CM

## 2025-06-20 RX ORDER — LOSARTAN POTASSIUM 100 MG/1
100 TABLET ORAL DAILY
Qty: 90 TABLET | Refills: 0 | Status: SHIPPED | OUTPATIENT
Start: 2025-06-20

## 2025-06-20 RX ORDER — LEVOTHYROXINE SODIUM 50 UG/1
50 TABLET ORAL
Qty: 90 TABLET | Refills: 0 | Status: SHIPPED | OUTPATIENT
Start: 2025-06-20

## 2025-06-20 NOTE — TELEPHONE ENCOUNTER
Labs were due they are already in system please remind him to get his blood work done.  Thyroid refill sent to pharmacy

## 2025-06-20 NOTE — TELEPHONE ENCOUNTER
Requested Prescriptions     Pending Prescriptions Disp Refills    LEVOTHYROXINE 50 MCG Oral Tab [Pharmacy Med Name: LEVOTHYROXINE 0.05MG (50MCG) TAB] 90 tablet 2     Sig: TAKE 1 TABLET(50 MCG) BY MOUTH BEFORE BREAKFAST     Signed Prescriptions Disp Refills    sertraline 50 MG Oral Tab 90 tablet 0     Sig: TAKE 1 TABLET(50 MG) BY MOUTH DAILY     Authorizing Provider: STACY RANDHAWA     Ordering User: ANG RIOS    losartan 100 MG Oral Tab 90 tablet 0     Sig: TAKE 1 TABLET(100 MG) BY MOUTH DAILY     Authorizing Provider: STACY RANDHAWA     Ordering User: ANG RIOS       Last Refill: 9/26/24    Last OV: 1/27    Next OV: 7/28

## 2025-06-25 ENCOUNTER — OFFICE VISIT (OUTPATIENT)
Facility: LOCATION | Age: 75
End: 2025-06-25
Payer: MEDICARE

## 2025-06-25 DIAGNOSIS — E11.42 DIABETIC POLYNEUROPATHY ASSOCIATED WITH TYPE 2 DIABETES MELLITUS (HCC): Primary | ICD-10-CM

## 2025-06-25 DIAGNOSIS — B35.1 ONYCHOMYCOSIS: ICD-10-CM

## 2025-06-25 DIAGNOSIS — L60.3 NAIL DYSTROPHY: ICD-10-CM

## 2025-06-25 DIAGNOSIS — M20.42 HAMMER TOES OF BOTH FEET: ICD-10-CM

## 2025-06-25 DIAGNOSIS — M20.41 HAMMER TOES OF BOTH FEET: ICD-10-CM

## 2025-06-25 PROCEDURE — 99213 OFFICE O/P EST LOW 20 MIN: CPT

## 2025-06-25 NOTE — PROGRESS NOTES
[facility]  Progress Note    Yifan Leonardo is a 75 year old male.   Chief Complaint   Patient presents with    Diabetic Foot Care     Diabetic foot exam/nail care  A1c on 1/22/2025: 6.2  LOV with PCP: 1/27/2025 with Yandy Huston PA-C         HPI:     Patient is a pleasant 74-year-old male who presents to clinic for diabetic foot exam and nail trim.  Pt has elongated and thickened nails that he has difficulty trimming on his own. He is ambulatory today in well supportive tennis shoes.  Patient has been utilizing a toe sleeve to right foot 3rd digit to prevent rubbing on neighboring toes.  His last hemoglobin A1c was 6.2% on 1/22/25. No other complaints are mentioned. Past medical history, medications, and allergies reviewed.      Allergies: Sulfa antibiotics and Zestril [lisinopril]   Current Outpatient Medications   Medication Sig Dispense Refill    levothyroxine 50 MCG Oral Tab TAKE 1 TABLET(50 MCG) BY MOUTH BEFORE BREAKFAST 90 tablet 0    sertraline 50 MG Oral Tab TAKE 1 TABLET(50 MG) BY MOUTH DAILY 90 tablet 0    losartan 100 MG Oral Tab TAKE 1 TABLET(100 MG) BY MOUTH DAILY 90 tablet 0    FREESTYLE LITE TEST In Vitro Strip TEST ONCE DAILY AS DIRECTED 100 strip 3    methylphenidate ER (CONCERTA) 27 MG Oral Tab CR Take 1 tablet (27 mg total) by mouth daily. 30 tablet 0    semaglutide (OZEMPIC, 0.25 OR 0.5 MG/DOSE,) 2 MG/3ML Subcutaneous Solution Pen-injector Inject 0.5 mg into the skin once a week. 9 mL 0    METFORMIN HCL 1000 MG Oral Tab TAKE 1 TABLET(1000 MG) BY MOUTH TWICE DAILY WITH MEALS 180 tablet 1    AMLODIPINE 5 MG Oral Tab TAKE 1 TABLET(5 MG) BY MOUTH DAILY 90 tablet 1    FREESTYLE LANCETS Does not apply Misc TEST ONCE DAILY AS DIRECTED 100 each 3    hydroCHLOROthiazide 25 MG Oral Tab Take 1 tablet (25 mg total) by mouth every morning. 90 tablet 1    ATORVASTATIN 10 MG Oral Tab TAKE 1 TABLET(10 MG) BY MOUTH DAILY 90 tablet 3    tamsulosin 0.4 MG Oral Cap Take 1 capsule (0.4  mg total) by mouth every evening. Take 1/2 hour following the same meal each day 90 capsule 6    PEG 3350-KCl-Na Bicarb-NaCl 420 g Oral Recon Soln Take as directed by physician. 4000 mL 0    ketoconazole 2 % External Cream Apply 1 Application topically daily as needed. 60 g 1    lidocaine 5 % External Patch PLACE 1 PATCH ONTO THE SKIN DAILY. PUT ON FOR 12 HOURS THEN OFF FOR 12 HOURS 30 patch 5    Probiotic Product (PROBIOTIC PEARLS) Oral Cap Take 1 capsule by mouth daily.      Turmeric 500 MG Oral Cap Take 1 capsule by mouth daily.      Bioflavonoid Products (BIOFLEX) Oral Tab Take 1 tablet by mouth daily.      Cholecalciferol (VITAMIN D3) 1000 UNITS Oral Cap Take 1 tablet by mouth daily.      Cinnamon 500 MG Oral Cap Take 1 capsule by mouth 2 (two) times daily.        Omega-3 Fatty Acids (FISH OIL TRIPLE STRENGTH) 1400 MG Oral Cap Take 1 capsule by mouth daily.      Multiple Vitamins-Minerals (CENTRUM SILVER ULTRA MENS) Oral Tab Take 1 tablet by mouth daily.      ZYRTEC 10 MG OR TABS Take 1 mg by mouth daily.        Past Medical History:    Allergic rhinitis, cause unspecified    Chronic fatigue    Controlled type 2 diabetes mellitus without complication, without long-term current use of insulin (HCC)    Diabetes (HCC)    Diverticulosis    Dwight Torrez M.D.    Essential hypertension    Fall    Infective otitis externa, unspecified    Internal hemorrhoids    Dwight Torrez M.D.    Intestinal disaccharidase deficiencies and disaccharide malabsorption    Obesity    Overweight(278.02)    Pre-ulcerative corn or callous    Preoperative examination, unspecified    Prostate cancer (HCC)    Psychosexual dysfunction with inhibited sexual excitement    Sleep apnea    Uncontrolled type 2 diabetes mellitus with hyperglycemia (HCC)      Past Surgical History:   Procedure Laterality Date    Colonoscopy  01/01/05    complete    Colonoscopy      Colonoscopy,diagnostic  04/25/2016    Due 04/25/2026; Dwight Torrez M.D.    Hernia surgery   08    inguinal    Hernia surgery  10/10    Hip replacement surgery  89    Total    Knee arthroscp harv  11/15/10    Total; Left; Laterality    Knee replacement surgery      left TKA by Dr. Barlow    Leg/ankle surgery proc unlisted  61    Leg Osteotomy Tibial    Other surgical history      left tibial osteotomy as teen    Radiation  2022    Tonsillectomy      and adenoidectomy as child      Family History   Problem Relation Age of Onset    Colon Cancer Father     Hypertension Mother     Dementia Mother         alzheimers    Other (hypertension) Mother     Ovarian Cancer Sister     Hypertension Maternal Grandmother     Heart Disorder Maternal Grandmother         CAD    Heart Attack Maternal Grandmother     Heart Attack Maternal Grandfather     No Known Problems Son     No Known Problems Son     No Known Problems Son       Social History     Socioeconomic History    Marital status:    Tobacco Use    Smoking status: Former     Current packs/day: 0.00     Average packs/day: 1.5 packs/day for 5.0 years (7.5 ttl pk-yrs)     Types: Cigarettes     Start date: 1968     Quit date: 1973     Years since quittin.1    Smokeless tobacco: Never   Vaping Use    Vaping status: Never Used   Substance and Sexual Activity    Alcohol use: No     Alcohol/week: 0.0 standard drinks of alcohol    Drug use: No   Other Topics Concern     Service No    Blood Transfusions No    Caffeine Concern Yes     Comment: 0-1 teas daily    Occupational Exposure No    Hobby Hazards No    Sleep Concern No    Stress Concern No    Weight Concern No    Special Diet No    Back Care No    Exercise No     Comment: gym but has been on hold with holidays    Bike Helmet No    Seat Belt Yes    Self-Exams No           REVIEW OF SYSTEMS:     Today reviewed systems as documented below  GENERAL HEALTH: feels well otherwise  SKIN: denies any unusual skin lesions or rashes  RESPIRATORY: denies shortness of breath with  exertion  CARDIOVASCULAR: denies chest pain on exertion  GI: denies abdominal pain and denies heartburn  NEURO: denies headaches  MUSCULO: Acknowledges arthritis      EXAM:     GENERAL: well developed, well nourished, in no apparent distress  EXTREMITIES:  1. Integument: Normal skin temperature and turgor. Nails x 10 are elongated, incurvated, and thickened. Skin appears moist, warm, and supple with positive hair growth. There are no color changes. No open lesions. No macerations. No Hyperkeratotic lesions.   2. Vascular: Dorsalis pedis 2/4 bilateral and posterior tibial pulses 2/4 bilateral, capillary refill normal.  3. Neurological: Gross sensation intact via light touch bilaterally.  Normal sharp/dull sensation.  Diminished protective sensation to bilateral lower extremities  4. Musculoskeletal: All muscle groups are graded 5/5 in the foot and ankle.  Flexion contracture of lesser digits to bilateral feet      ASSESSMENT AND PLAN:   Diagnoses and all orders for this visit:    Diabetic polyneuropathy associated with type 2 diabetes mellitus (HCC)    Onychomycosis    Nail dystrophy    Hammer toes of both feet      Plan:   Plan:   - Patient was seen and evaluated today in clinic.  Chart history reviewed.    - At today's visit sharply debrided nails x10 with a sterile nail nipper achieving a 20% reduction in thickness and length, without incident. Slant back procedure performed to ingrown nails. Nails further smoothed with dremel.     - Discussed importance of proper pedal hygiene, regular foot checks, and tight glucose control by following diet and medication regimen.     - Because the patient suffers from neuropathy, pt was educated to keep his feet clean; ensure that he washes and dries between toes.    - Continue ambulating with diabetic shoes and inserts.    - Instructed patient to utilize thin toe spacer between second and third digit to prevent rubbing as toe sleeve causes slight maceration of skin due to the  heat and moisture.    -Educated patient on acute signs of infection.  Advised patient to seek immediate medical attention if any concerns arise.  - All of the patient's questions and concerns were addressed.  They indicated their understanding of these issues and agrees to the plan.      Time spent reviewing pertinent information from patient's chart, reviewing any pertinent imaging, obtaining history and physical exam, discussing and mutually agreeing on a treatment plan, and documenting encounter: 20 minutes    RTC 2 to 3 months      GIACOMO Byrd    [facility]            Dragon speech recognition software was used to prepare this note.  Errors in word recognition may occur.  Please contact me with any questions/concerns with this note.

## 2025-07-23 ENCOUNTER — LAB ENCOUNTER (OUTPATIENT)
Dept: LAB | Age: 75
End: 2025-07-23
Attending: FAMILY MEDICINE
Payer: MEDICARE

## 2025-07-23 DIAGNOSIS — E78.2 MIXED HYPERLIPIDEMIA: ICD-10-CM

## 2025-07-23 DIAGNOSIS — E06.3 HYPOTHYROIDISM DUE TO HASHIMOTO'S THYROIDITIS: ICD-10-CM

## 2025-07-23 DIAGNOSIS — E11.9 CONTROLLED TYPE 2 DIABETES MELLITUS WITHOUT COMPLICATION, WITHOUT LONG-TERM CURRENT USE OF INSULIN (HCC): ICD-10-CM

## 2025-07-23 DIAGNOSIS — I10 ESSENTIAL HYPERTENSION WITH GOAL BLOOD PRESSURE LESS THAN 130/80: ICD-10-CM

## 2025-07-23 DIAGNOSIS — Z13.0 SCREENING FOR DEFICIENCY ANEMIA: ICD-10-CM

## 2025-07-23 LAB
ALBUMIN SERPL-MCNC: 4.4 G/DL (ref 3.2–4.8)
ALBUMIN/GLOB SERPL: 1.5 {RATIO} (ref 1–2)
ALP LIVER SERPL-CCNC: 93 U/L (ref 45–117)
ALT SERPL-CCNC: 20 U/L (ref 10–49)
ANION GAP SERPL CALC-SCNC: 9 MMOL/L (ref 0–18)
AST SERPL-CCNC: 19 U/L (ref ?–34)
BASOPHILS # BLD AUTO: 0.06 X10(3) UL (ref 0–0.2)
BASOPHILS NFR BLD AUTO: 0.6 %
BILIRUB SERPL-MCNC: 0.2 MG/DL (ref 0.2–1.1)
BUN BLD-MCNC: 9 MG/DL (ref 9–23)
CALCIUM BLD-MCNC: 9.3 MG/DL (ref 8.7–10.6)
CHLORIDE SERPL-SCNC: 104 MMOL/L (ref 98–112)
CHOLEST SERPL-MCNC: 117 MG/DL (ref ?–200)
CO2 SERPL-SCNC: 28 MMOL/L (ref 21–32)
CREAT BLD-MCNC: 1.02 MG/DL (ref 0.7–1.3)
CREAT UR-SCNC: 244.4 MG/DL
EGFRCR SERPLBLD CKD-EPI 2021: 77 ML/MIN/1.73M2 (ref 60–?)
EOSINOPHIL # BLD AUTO: 0.37 X10(3) UL (ref 0–0.7)
EOSINOPHIL NFR BLD AUTO: 3.6 %
ERYTHROCYTE [DISTWIDTH] IN BLOOD BY AUTOMATED COUNT: 13.3 %
FASTING PATIENT LIPID ANSWER: YES
FASTING STATUS PATIENT QL REPORTED: YES
GLOBULIN PLAS-MCNC: 2.9 G/DL (ref 2–3.5)
GLUCOSE BLD-MCNC: 97 MG/DL (ref 70–99)
HCT VFR BLD AUTO: 42.7 % (ref 39–53)
HDLC SERPL-MCNC: 46 MG/DL (ref 40–59)
HGB BLD-MCNC: 14.1 G/DL (ref 13–17.5)
IMM GRANULOCYTES # BLD AUTO: 0.03 X10(3) UL (ref 0–1)
IMM GRANULOCYTES NFR BLD: 0.3 %
LDLC SERPL CALC-MCNC: 49 MG/DL (ref ?–100)
LYMPHOCYTES # BLD AUTO: 2.93 X10(3) UL (ref 1–4)
LYMPHOCYTES NFR BLD AUTO: 28.2 %
MCH RBC QN AUTO: 28.4 PG (ref 26–34)
MCHC RBC AUTO-ENTMCNC: 33 G/DL (ref 31–37)
MCV RBC AUTO: 86.1 FL (ref 80–100)
MICROALBUMIN UR-MCNC: 1.3 MG/DL
MICROALBUMIN/CREAT 24H UR-RTO: 5.3 UG/MG (ref ?–30)
MONOCYTES # BLD AUTO: 0.85 X10(3) UL (ref 0.1–1)
MONOCYTES NFR BLD AUTO: 8.2 %
NEUTROPHILS # BLD AUTO: 6.16 X10 (3) UL (ref 1.5–7.7)
NEUTROPHILS # BLD AUTO: 6.16 X10(3) UL (ref 1.5–7.7)
NEUTROPHILS NFR BLD AUTO: 59.1 %
NONHDLC SERPL-MCNC: 71 MG/DL (ref ?–130)
OSMOLALITY SERPL CALC.SUM OF ELEC: 291 MOSM/KG (ref 275–295)
PLATELET # BLD AUTO: 386 10(3)UL (ref 150–450)
POTASSIUM SERPL-SCNC: 3.9 MMOL/L (ref 3.5–5.1)
PROT SERPL-MCNC: 7.3 G/DL (ref 5.7–8.2)
RBC # BLD AUTO: 4.96 X10(6)UL (ref 3.8–5.8)
SODIUM SERPL-SCNC: 141 MMOL/L (ref 136–145)
T4 FREE SERPL-MCNC: 1.2 NG/DL (ref 0.8–1.7)
TRIGL SERPL-MCNC: 124 MG/DL (ref 30–149)
TSI SER-ACNC: 3.24 UIU/ML (ref 0.55–4.78)
VLDLC SERPL CALC-MCNC: 18 MG/DL (ref 0–30)
WBC # BLD AUTO: 10.4 X10(3) UL (ref 4–11)

## 2025-07-23 PROCEDURE — 36415 COLL VENOUS BLD VENIPUNCTURE: CPT

## 2025-07-23 PROCEDURE — 82043 UR ALBUMIN QUANTITATIVE: CPT

## 2025-07-23 PROCEDURE — 80061 LIPID PANEL: CPT

## 2025-07-23 PROCEDURE — 82570 ASSAY OF URINE CREATININE: CPT

## 2025-07-23 PROCEDURE — 84439 ASSAY OF FREE THYROXINE: CPT

## 2025-07-23 PROCEDURE — 85025 COMPLETE CBC W/AUTO DIFF WBC: CPT

## 2025-07-23 PROCEDURE — 84443 ASSAY THYROID STIM HORMONE: CPT

## 2025-07-23 PROCEDURE — 80053 COMPREHEN METABOLIC PANEL: CPT

## 2025-07-28 ENCOUNTER — OFFICE VISIT (OUTPATIENT)
Dept: FAMILY MEDICINE CLINIC | Facility: CLINIC | Age: 75
End: 2025-07-28
Payer: MEDICARE

## 2025-07-28 VITALS
TEMPERATURE: 97 F | OXYGEN SATURATION: 98 % | SYSTOLIC BLOOD PRESSURE: 126 MMHG | DIASTOLIC BLOOD PRESSURE: 74 MMHG | RESPIRATION RATE: 16 BRPM | WEIGHT: 196.63 LBS | BODY MASS INDEX: 32.76 KG/M2 | HEIGHT: 65 IN | HEART RATE: 84 BPM

## 2025-07-28 DIAGNOSIS — F32.5 DEPRESSION, MAJOR, IN REMISSION: ICD-10-CM

## 2025-07-28 DIAGNOSIS — I25.10 CORONARY ARTERY DISEASE DUE TO LIPID RICH PLAQUE: ICD-10-CM

## 2025-07-28 DIAGNOSIS — Z96.641 HISTORY OF RIGHT HIP REPLACEMENT: ICD-10-CM

## 2025-07-28 DIAGNOSIS — E11.9 DIABETES MELLITUS WITH COINCIDENT HYPERTENSION (HCC): ICD-10-CM

## 2025-07-28 DIAGNOSIS — E11.65 CONTROLLED TYPE 2 DIABETES MELLITUS WITH HYPERGLYCEMIA, WITHOUT LONG-TERM CURRENT USE OF INSULIN (HCC): ICD-10-CM

## 2025-07-28 DIAGNOSIS — E66.811 CLASS 1 OBESITY DUE TO EXCESS CALORIES WITH SERIOUS COMORBIDITY AND BODY MASS INDEX (BMI) OF 32.0 TO 32.9 IN ADULT: Chronic | ICD-10-CM

## 2025-07-28 DIAGNOSIS — Z85.46 PERSONAL HISTORY OF PROSTATE CANCER: ICD-10-CM

## 2025-07-28 DIAGNOSIS — F41.1 GAD (GENERALIZED ANXIETY DISORDER): ICD-10-CM

## 2025-07-28 DIAGNOSIS — M20.42 HAMMERTOES OF BOTH FEET: ICD-10-CM

## 2025-07-28 DIAGNOSIS — E78.2 MIXED HYPERLIPIDEMIA: ICD-10-CM

## 2025-07-28 DIAGNOSIS — L21.9 SEBORRHEIC DERMATITIS: ICD-10-CM

## 2025-07-28 DIAGNOSIS — M51.360 DEGENERATION OF INTERVERTEBRAL DISC OF LUMBAR REGION WITH DISCOGENIC BACK PAIN: ICD-10-CM

## 2025-07-28 DIAGNOSIS — Z96.652 HISTORY OF LEFT KNEE REPLACEMENT: ICD-10-CM

## 2025-07-28 DIAGNOSIS — Z00.00 ENCOUNTER FOR ANNUAL HEALTH EXAMINATION: Primary | ICD-10-CM

## 2025-07-28 DIAGNOSIS — M21.41 PES PLANUS OF BOTH FEET: ICD-10-CM

## 2025-07-28 DIAGNOSIS — I25.83 CORONARY ARTERY DISEASE DUE TO LIPID RICH PLAQUE: ICD-10-CM

## 2025-07-28 DIAGNOSIS — Z79.899 MEDICATION MANAGEMENT: ICD-10-CM

## 2025-07-28 DIAGNOSIS — Z97.4 DOES USE HEARING AID: ICD-10-CM

## 2025-07-28 DIAGNOSIS — Z12.83 SKIN CANCER SCREENING: ICD-10-CM

## 2025-07-28 DIAGNOSIS — M21.42 PES PLANUS OF BOTH FEET: ICD-10-CM

## 2025-07-28 DIAGNOSIS — Z80.41 FH: OVARIAN CANCER IN FIRST DEGREE RELATIVE: ICD-10-CM

## 2025-07-28 DIAGNOSIS — I67.9 CEREBROVASCULAR SMALL VESSEL DISEASE: ICD-10-CM

## 2025-07-28 DIAGNOSIS — I44.4 LEFT ANTERIOR FASCICULAR BLOCK (LAFB): ICD-10-CM

## 2025-07-28 DIAGNOSIS — M15.0 PRIMARY OSTEOARTHRITIS INVOLVING MULTIPLE JOINTS: ICD-10-CM

## 2025-07-28 DIAGNOSIS — K76.0 FATTY LIVER: ICD-10-CM

## 2025-07-28 DIAGNOSIS — B35.1 FUNGAL INFECTION OF TOENAIL: ICD-10-CM

## 2025-07-28 DIAGNOSIS — Z80.0 FH: COLON CANCER IN FIRST DEGREE RELATIVE <60 YEARS OLD: ICD-10-CM

## 2025-07-28 DIAGNOSIS — E06.3 HYPOTHYROIDISM DUE TO HASHIMOTO'S THYROIDITIS: ICD-10-CM

## 2025-07-28 DIAGNOSIS — Z12.11 SCREENING FOR COLON CANCER: ICD-10-CM

## 2025-07-28 DIAGNOSIS — M20.41 HAMMERTOES OF BOTH FEET: ICD-10-CM

## 2025-07-28 DIAGNOSIS — E66.09 CLASS 1 OBESITY DUE TO EXCESS CALORIES WITH SERIOUS COMORBIDITY AND BODY MASS INDEX (BMI) OF 32.0 TO 32.9 IN ADULT: Chronic | ICD-10-CM

## 2025-07-28 DIAGNOSIS — F90.0 ATTENTION DEFICIT HYPERACTIVITY DISORDER (ADHD), PREDOMINANTLY INATTENTIVE TYPE: ICD-10-CM

## 2025-07-28 DIAGNOSIS — G47.33 OBSTRUCTIVE SLEEP APNEA: ICD-10-CM

## 2025-07-28 DIAGNOSIS — I10 DIABETES MELLITUS WITH COINCIDENT HYPERTENSION (HCC): ICD-10-CM

## 2025-07-28 DIAGNOSIS — I10 ESSENTIAL HYPERTENSION WITH GOAL BLOOD PRESSURE LESS THAN 130/80: ICD-10-CM

## 2025-07-28 DIAGNOSIS — I51.89 GRADE II DIASTOLIC DYSFUNCTION: ICD-10-CM

## 2025-07-28 DIAGNOSIS — G25.0 BENIGN ESSENTIAL TREMOR: ICD-10-CM

## 2025-07-28 DIAGNOSIS — I83.892 VARICOSE VEINS OF LEFT LEG WITH EDEMA: ICD-10-CM

## 2025-07-28 LAB — HEMOGLOBIN A1C: 6.2 % (ref 4.3–5.6)

## 2025-07-28 PROCEDURE — 99215 OFFICE O/P EST HI 40 MIN: CPT | Performed by: FAMILY MEDICINE

## 2025-07-28 PROCEDURE — 99499 UNLISTED E&M SERVICE: CPT | Performed by: FAMILY MEDICINE

## 2025-07-28 PROCEDURE — G0439 PPPS, SUBSEQ VISIT: HCPCS | Performed by: FAMILY MEDICINE

## 2025-07-28 PROCEDURE — 90677 PCV20 VACCINE IM: CPT | Performed by: FAMILY MEDICINE

## 2025-07-28 PROCEDURE — 83036 HEMOGLOBIN GLYCOSYLATED A1C: CPT | Performed by: FAMILY MEDICINE

## 2025-07-28 PROCEDURE — G0009 ADMIN PNEUMOCOCCAL VACCINE: HCPCS | Performed by: FAMILY MEDICINE

## 2025-07-28 PROCEDURE — G2211 COMPLEX E/M VISIT ADD ON: HCPCS | Performed by: FAMILY MEDICINE

## 2025-07-28 RX ORDER — METHYLPHENIDATE HYDROCHLORIDE 27 MG/1
27 TABLET ORAL DAILY
Qty: 30 TABLET | Refills: 0 | Status: SHIPPED | OUTPATIENT
Start: 2025-07-28 | End: 2025-08-27

## 2025-07-28 RX ORDER — METHYLPHENIDATE HYDROCHLORIDE 27 MG/1
27 TABLET ORAL DAILY
Qty: 30 TABLET | Refills: 0 | Status: SHIPPED | OUTPATIENT
Start: 2025-09-28 | End: 2025-10-28

## 2025-07-28 RX ORDER — LEVOTHYROXINE SODIUM 50 UG/1
50 TABLET ORAL
Qty: 90 TABLET | Refills: 3 | Status: SHIPPED | OUTPATIENT
Start: 2025-07-28

## 2025-07-28 RX ORDER — METHYLPHENIDATE HYDROCHLORIDE 27 MG/1
27 TABLET ORAL DAILY
Qty: 30 TABLET | Refills: 0 | Status: SHIPPED | OUTPATIENT
Start: 2025-08-28 | End: 2025-09-27

## 2025-07-29 PROBLEM — Z96.652 HISTORY OF LEFT KNEE REPLACEMENT: Status: ACTIVE | Noted: 2025-07-29

## 2025-07-29 PROBLEM — I51.89 GRADE II DIASTOLIC DYSFUNCTION: Status: ACTIVE | Noted: 2025-07-29

## 2025-07-29 PROBLEM — G47.33 OSA ON CPAP: Status: RESOLVED | Noted: 2023-07-27 | Resolved: 2025-07-29

## 2025-07-29 PROBLEM — R26.89 BALANCE PROBLEM: Status: RESOLVED | Noted: 2023-09-14 | Resolved: 2025-07-29

## 2025-07-29 PROBLEM — Z80.41 FH: OVARIAN CANCER IN FIRST DEGREE RELATIVE: Status: ACTIVE | Noted: 2025-07-29

## 2025-07-29 PROBLEM — I73.9 SMALL VESSEL DISEASE: Status: RESOLVED | Noted: 2023-09-14 | Resolved: 2025-07-29

## 2025-07-29 PROBLEM — Z85.46 HISTORY OF PROSTATE CANCER: Status: ACTIVE | Noted: 2021-12-22

## 2025-07-29 PROBLEM — Z80.0 FH: COLON CANCER IN FIRST DEGREE RELATIVE <60 YEARS OLD: Status: ACTIVE | Noted: 2025-07-29

## 2025-08-27 ENCOUNTER — OFFICE VISIT (OUTPATIENT)
Facility: LOCATION | Age: 75
End: 2025-08-27

## 2025-08-27 VITALS — HEIGHT: 65 IN | WEIGHT: 196 LBS | BODY MASS INDEX: 32.65 KG/M2

## 2025-08-27 DIAGNOSIS — M20.41 HAMMER TOES OF BOTH FEET: ICD-10-CM

## 2025-08-27 DIAGNOSIS — L60.3 NAIL DYSTROPHY: ICD-10-CM

## 2025-08-27 DIAGNOSIS — B35.1 ONYCHOMYCOSIS: ICD-10-CM

## 2025-08-27 DIAGNOSIS — E11.42 DIABETIC POLYNEUROPATHY ASSOCIATED WITH TYPE 2 DIABETES MELLITUS (HCC): Primary | ICD-10-CM

## 2025-08-27 DIAGNOSIS — M20.42 HAMMER TOES OF BOTH FEET: ICD-10-CM

## 2025-08-27 PROCEDURE — 99213 OFFICE O/P EST LOW 20 MIN: CPT

## (undated) DIAGNOSIS — E11.9 CONTROLLED TYPE 2 DIABETES MELLITUS WITHOUT COMPLICATION, WITHOUT LONG-TERM CURRENT USE OF INSULIN (HCC): ICD-10-CM

## (undated) DIAGNOSIS — E06.3 HYPOTHYROIDISM DUE TO HASHIMOTO'S THYROIDITIS: ICD-10-CM

## (undated) DIAGNOSIS — E03.8 HYPOTHYROIDISM DUE TO HASHIMOTO'S THYROIDITIS: ICD-10-CM

## (undated) DIAGNOSIS — F41.1 GAD (GENERALIZED ANXIETY DISORDER): ICD-10-CM

## (undated) DIAGNOSIS — F32.4 MAJOR DEPRESSIVE DISORDER WITH SINGLE EPISODE, IN PARTIAL REMISSION (HCC): ICD-10-CM

## (undated) NOTE — MR AVS SNAPSHOT
[facility] [place]  Giuseppe Santoro [facility]  [phone]               Thank you for choosing us for your health care visit with Param Gonsales PA-C.   We are glad to serve you and happy to provide you with [facility] None      Allergies as of May 02, 2017     Sulfa Antibiotics Unknown    Zestril [Lisinopril] Other (See Comments)    TABS: Cough                Today's Vital Signs     BP Pulse Weight             116/60 mmHg 88 208 lb            Current Medications HOURS   Commonly known as:  LIDODERM           Losartan Potassium-HCTZ 100-25 MG Tabs   TAKE 1 TABLET BY MOUTH ONCE DAILY.    Commonly known as:  HYZAAR           MetFORMIN HCl 500 MG Tabs   TAKE 1 TABLET (500 MG TOTAL) BY MOUTH 2 (TWO) TIMES DAILY WITH MARYCRUZ

## (undated) NOTE — LETTER
07/03/18              Dear Mikal Morrow:     In an effort to provide you with the best possible care for your diabetes, we ask that you please schedule the following appointment(s) as indicated below.   Our records indicate that you are in need of th

## (undated) NOTE — LETTER
Patient Name: Martha Bernabe  YOB: 1950          MRN :  [id number]  Date:  7/14/2021  Referring Physician:  Neeta Kirkpatrick  Pt has attended 14 visits in Physical Therapy.    Diagnosis: L elbow pain; impaired balance progress achieved in PT -MET    Plan: D/C with continued compliance to HEP    Patient/Family/Caregiver was advised of these findings, precautions, and treatment options and has agreed to actively participate in planning and for this course of care.     Than

## (undated) NOTE — LETTER
AUTHORIZATION FOR SURGICAL OPERATION OR OTHER PROCEDURE    1. I hereby authorize Dr. Danielle Salas , and [facility] staff assigned to my case to perform the following operation and/or procedure at the [facility]:    left foot cortisone injection _______________________________________________________________________________________________      _______________________________________________________________________________________________    2. My physician has explained the nature and purpose of the operation or other procedure, possible alternative methods of treatment, the risks involved, and the possibility of complication to me. I acknowledge that no guarantee has been made as to the result that may be obtained. 3.  I recognize that, during the course of this operation, or other procedure, unforseen conditions may necessitate additional or different procedure than those listed above. I, therefore, further authorize and request that the above named physician, his/her physician assistants or designees perform such procedures as are, in his/her professional opinion, necessary and desirable. 4.  Any tissue or organs removed in the operation or other procedure may be disposed of by and at the discretion of the [facility] and [facility] AT [facility]. 5.  I understand that in the event of a medical emergency, I will be transported by local paramedics to [facility] or other hospital emergency department. 6.  I certify that I have read and fully understand the above consent to operation and/or other procedure. 7.  I acknowledge that my physician has explained sedation/analgesia administration to me including the risks and benefits. I consent to the administration of sedation/analgesia as may be necessary or desirable in the judgement of my physician.     Witness signature: ___________________________________________________ Date:  ______/______/_____                    Time: ________ A. M.  P.M. Patient Name:  ______________________________________________________  (please print)      Patient signature:  ___________________________________________________             Relationship to Patient:           []  Parent    Responsible person                          []  Spouse  In case of minor or                    [] Other  _____________   Incompetent name:  __________________________________________________                               (please print)      _____________      Responsible person  In case of minor or  Incompetent signature:  _______________________________________________    Statement of Physician  My signature below affirms that prior to the time of the procedure, I have explained to the patient and/or his/her guardian, the risks and benefits involved in the proposed treatment and any reasonable alternative to the proposed treatment. I have also explained the risks and benefits involved in the refusal of the proposed treatment and have answered the patient's questions.                         Date:  ______/______/_______  Provider                      Signature:  __________________________________________________________       Time:  ___________ A.M    P.M.

## (undated) NOTE — MR AVS SNAPSHOT
[facility]  [phone]               Thank you for choosing us for your health care visit with Maria E De Paz PA-C.   We are glad to serve you and happy to provide you with [facility] TAKE 1 TABLET (10 MG TOTAL) BY MOUTH ONCE DAILY. Commonly known as:  LIPITOR           BIOFLEX Tabs   Take 1 tablet by mouth daily. BuPROPion HCl ER (XL) 300 MG Tb24   Take 1 tablet (300 mg total) by mouth daily.    What changed:  Another medica Turmeric 500 MG Caps   Take 1 capsule by mouth daily. Vitamin D3 1000 units Caps   Take 1 tablet by mouth daily.            ZYRTEC 10 MG Tabs   Generic drug:  cetirizine   1 TABLET DAILY                Where to Get Your Medications      These med

## (undated) NOTE — MR AVS SNAPSHOT
After Visit Summary   2024    Yifan Leonardo   MRN: [id number]           Visit Information     Date & Time  2024  9:00 AM Provider  [id number] [facility] EEG Dept. Phone  [phone]      Allergies as of 2024  Review status set to Review Complete on 2023       Noted Reaction Type Reactions    Sulfa Antibiotics 2009    UNKNOWN    Zestril [lisinopril] 06/15/2012    OTHER (SEE COMMENTS)    TABS: Cough      Your Current Medications        Dosage    Dexmethylphenidate HCl ER (FOCALIN XR) 20 MG Oral Capsule SR 24 Hr Take 1 capsule (20 mg total) by mouth daily.    Dexmethylphenidate HCl ER (FOCALIN XR) 20 MG Oral Capsule SR 24 Hr Starting on 2024. Take 1 capsule (20 mg total) by mouth daily.    Dexmethylphenidate HCl ER (FOCALIN XR) 20 MG Oral Capsule SR 24 Hr Starting on 2024. Take 1 capsule (20 mg total) by mouth daily.    Dulaglutide (TRULICITY) 3 MG/0.5ML Subcutaneous Solution Pen-injector Inject 3 mg into the skin once a week.    AMLODIPINE 5 MG Oral Tab TAKE 1 TABLET(5 MG) BY MOUTH DAILY    Meloxicam 15 MG Oral Tab Take 1 tablet (15 mg total) by mouth daily. Take once daily for 4 weeks    tamsulosin 0.4 MG Oral Cap Take 1 capsule (0.4 mg total) by mouth every evening. Take 1/2 hour following the same meal each day    Dexmethylphenidate HCl ER (FOCALIN XR) 20 MG Oral Capsule SR 24 Hr () Take 1 capsule (20 mg total) by mouth daily.    Dexmethylphenidate HCl ER (FOCALIN XR) 20 MG Oral Capsule SR 24 Hr () Take 1 capsule (20 mg total) by mouth daily.    cyclobenzaprine 5 MG Oral Tab Take 1 tablet (5 mg total) by mouth 3 (three) times daily as needed for Muscle spasms (Lower back pain).    sertraline 50 MG Oral Tab Take 1 tablet (50 mg total) by mouth daily.    atorvastatin 10 MG Oral Tab Take 1 tablet (10 mg total) by mouth daily.    losartan 100 MG Oral Tab Take 1 tablet (100 mg total) by mouth daily.    hydroCHLOROthiazide 25 MG Oral Tab  Take 1 tablet (25 mg total) by mouth every morning.    metFORMIN  MG Oral Tablet 24 Hr Take 1 tablet (750 mg total) by mouth 2 (two) times daily with meals.    LEVOTHYROXINE 50 MCG Oral Tab TAKE 1 TABLET(50 MCG) BY MOUTH BEFORE BREAKFAST    FREESTYLE LANCETS Does not apply Misc TEST ONCE DAILY AS DIRECTED    Glucose Blood (FREESTYLE LITE TEST) In Vitro Strip TEST ONCE DAILY AS DIRECTED    lidocaine 5 % External Patch PLACE 1 PATCH ONTO THE SKIN DAILY. PUT ON FOR 12 HOURS THEN OFF FOR 12 HOURS    Probiotic Product (PROBIOTIC PEARLS) Oral Cap Take 1 capsule by mouth daily.    Turmeric 500 MG Oral Cap Take 1 capsule by mouth daily.    ketoconazole (NIZORAL) 2 % External Cream APPLY A THIN FILM TO AFFECTED AREA(S) AS NEEDED EVERY DAY    Bioflavonoid Products (BIOFLEX) Oral Tab Take 1 tablet by mouth daily.    Cholecalciferol (VITAMIN D3) 1000 UNITS Oral Cap Take 1 tablet by mouth daily.    Cinnamon 500 MG Oral Cap Take 1 capsule by mouth 2 (two) times daily.      Omega-3 Fatty Acids (FISH OIL TRIPLE STRENGTH) 1400 MG Oral Cap Take 1 capsule by mouth daily.    Multiple Vitamins-Minerals (CENTRUM SILVER ULTRA MENS) Oral Tab Take 1 tablet by mouth daily.    ZYRTEC 10 MG OR TABS Take 1 mg by mouth daily.      Diagnoses for This Visit    Right leg weakness   [256195]             We Ordered the Following     Normal Orders This Visit    EEG (At [facility]) [NEU4 CUSTOM]       Future Appointments        Provider Department    1/31/2024 10:15 AM Sheldon Michael [facility]    2/5/2024 11:00 AM Mayra Santizo [facility]    3/18/2024 8:00 AM Yandy Huston [facility]    3/19/2024 9:20 AM Sheldon Camilo [address]    4/1/2024 10:30 AM Ethan Elizabeth [facility]                Did you know  that [facility] primary care physicians now offer Video Visits through [email or web address] for adult patients for a variety of conditions such as allergies, back pain and cold symptoms? Skip the drive and waiting room and online chat with a doctor face-to-face using your web-cam enabled computer or mobile device wherever you are. Video Visits cost $50 and can be paid hassle-free using a credit, debit, or health savings card.  Not active on [email or web address]? Ask us how to get signed up today!          If you receive a survey from Jonas Astudillo, please take a few minutes to complete it and provide feedback. We strive to deliver the best patient experience and are looking for ways to make improvements. Your feedback will help us do so. For more information on [employer], please visit [email or web address]           No text in SmartText           No text in SmartText

## (undated) NOTE — ED AVS SNAPSHOT
Jeanine Stallings   MRN: [id number]    Department:  [facility] Emergency Department in [place]   Date of Visit:  3/7/2020           Disclosure     Insurance plans vary and the physician(s) referred by the ER may not be covered by your plan.  Please con tell this physician (or your personal doctor if your instructions are to return to your personal doctor) about any new or lasting problems. The primary care or specialist physician will see patients referred from the [facility] Emergency Department.  Arthor Bernheim

## (undated) NOTE — LETTER
03/04/21          Dear Laura Olivia:     In an effort to provide you with the best possible care for your diabetes, we ask that you please schedule the following appointment(s) as indicated below.   Our records indicate that you are in need of the following:

## (undated) NOTE — MR AVS SNAPSHOT
[facility]  [phone]               Thank you for choosing us for your health care visit with Feli Pate MD.  We are glad to serve you and happy to provide you with this avelar Generic drug:  Cinnamon   Take 1 capsule by mouth 2 (two) times daily. Diclofenac Sodium 75 MG Tbec   Take 1 tablet (75 mg total) by mouth daily.    Commonly known as:  VOLTAREN           DiphenhydrAMINE HCl 25 MG Tabs   Take 25 mg by mouth nightl [address] [phone], [address]  [address] [facility]     Phone:  [phone]    - Amoxicillin-Pot Clavulanate 875-125 MG Tabs  - benzonatate 200 MG Caps      You can get these medications from any pharmacy     Bring a paper prescrip

## (undated) NOTE — MR AVS SNAPSHOT
[facility]  [phone]               Thank you for choosing us for your health care visit with Messi Freitas PA-C.   We are glad to serve you and happy to provide you with [facility] This list is accurate as of: 2/6/17  3:59 PM.  Always use your most recent med list.                AmLODIPine Besylate 5 MG Tabs   TAKE 1 TABLET (5 MG TOTAL) BY MOUTH ONCE DAILY.    Commonly known as:  NORVASC           Atorvastatin Calcium 10 MG Tabs   TA PROBIOTIC PEARLS Caps   Take 1 capsule by mouth daily. Turmeric 500 MG Caps   Take 1 capsule by mouth daily. Vitamin D3 1000 units Caps   Take 1 tablet by mouth daily.            ZYRTEC 10 MG Tabs   Generic drug:  cetirizine   1 TABLET Eat plenty of low-fat dairy products High fat meats and dairy   Choose whole grain products Foods high in sodium   Water is best for hydration Fast food.    Eat at home when possible     Tips for increasing your physical activity – Adults who are physically